# Patient Record
Sex: FEMALE | Race: ASIAN | NOT HISPANIC OR LATINO
[De-identification: names, ages, dates, MRNs, and addresses within clinical notes are randomized per-mention and may not be internally consistent; named-entity substitution may affect disease eponyms.]

---

## 2017-11-13 ENCOUNTER — RESULT REVIEW (OUTPATIENT)
Age: 27
End: 2017-11-13

## 2017-12-18 ENCOUNTER — INPATIENT (INPATIENT)
Facility: HOSPITAL | Age: 27
LOS: 0 days | Discharge: HOME | DRG: 358 | End: 2017-12-19
Attending: OBSTETRICS & GYNECOLOGY | Admitting: OBSTETRICS & GYNECOLOGY
Payer: COMMERCIAL

## 2017-12-18 VITALS — DIASTOLIC BLOOD PRESSURE: 84 MMHG | SYSTOLIC BLOOD PRESSURE: 124 MMHG | HEART RATE: 96 BPM | RESPIRATION RATE: 16 BRPM

## 2017-12-18 DIAGNOSIS — K66.1 HEMOPERITONEUM: ICD-10-CM

## 2017-12-18 DIAGNOSIS — R10.11 RIGHT UPPER QUADRANT PAIN: ICD-10-CM

## 2017-12-18 LAB
ALBUMIN SERPL ELPH-MCNC: 4.3 G/DL — SIGNIFICANT CHANGE UP (ref 3.3–5)
ALP SERPL-CCNC: 46 U/L — SIGNIFICANT CHANGE UP (ref 40–120)
ALT FLD-CCNC: 16 U/L RC — SIGNIFICANT CHANGE UP (ref 10–45)
ANION GAP SERPL CALC-SCNC: 10 MMOL/L — SIGNIFICANT CHANGE UP (ref 5–17)
APPEARANCE UR: CLEAR — SIGNIFICANT CHANGE UP
APTT BLD: 26.5 SEC — LOW (ref 27.5–37.4)
AST SERPL-CCNC: 18 U/L — SIGNIFICANT CHANGE UP (ref 10–40)
BACTERIA # UR AUTO: ABNORMAL /HPF
BASE EXCESS BLDV CALC-SCNC: -0.4 MMOL/L — SIGNIFICANT CHANGE UP (ref -2–2)
BASE EXCESS BLDV CALC-SCNC: -1.8 MMOL/L — SIGNIFICANT CHANGE UP (ref -2–2)
BASOPHILS # BLD AUTO: 0 K/UL — SIGNIFICANT CHANGE UP (ref 0–0.2)
BASOPHILS # BLD AUTO: 0 K/UL — SIGNIFICANT CHANGE UP (ref 0–0.2)
BASOPHILS # BLD AUTO: 0.1 K/UL — SIGNIFICANT CHANGE UP (ref 0–0.2)
BASOPHILS NFR BLD AUTO: 0 % — SIGNIFICANT CHANGE UP (ref 0–2)
BASOPHILS NFR BLD AUTO: 0.1 % — SIGNIFICANT CHANGE UP (ref 0–2)
BASOPHILS NFR BLD AUTO: 0.4 % — SIGNIFICANT CHANGE UP (ref 0–2)
BILIRUB SERPL-MCNC: 0.8 MG/DL — SIGNIFICANT CHANGE UP (ref 0.2–1.2)
BILIRUB UR-MCNC: NEGATIVE — SIGNIFICANT CHANGE UP
BLD GP AB SCN SERPL QL: NEGATIVE — SIGNIFICANT CHANGE UP
BUN SERPL-MCNC: 18 MG/DL — SIGNIFICANT CHANGE UP (ref 7–23)
CA-I SERPL-SCNC: 1.18 MMOL/L — SIGNIFICANT CHANGE UP (ref 1.12–1.3)
CA-I SERPL-SCNC: 1.2 MMOL/L — SIGNIFICANT CHANGE UP (ref 1.12–1.3)
CALCIUM SERPL-MCNC: 8.8 MG/DL — SIGNIFICANT CHANGE UP (ref 8.4–10.5)
CHLORIDE BLDV-SCNC: 103 MMOL/L — SIGNIFICANT CHANGE UP (ref 96–108)
CHLORIDE BLDV-SCNC: 104 MMOL/L — SIGNIFICANT CHANGE UP (ref 96–108)
CHLORIDE SERPL-SCNC: 99 MMOL/L — SIGNIFICANT CHANGE UP (ref 96–108)
CO2 BLDV-SCNC: 24 MMOL/L — SIGNIFICANT CHANGE UP (ref 22–30)
CO2 BLDV-SCNC: 26 MMOL/L — SIGNIFICANT CHANGE UP (ref 22–30)
CO2 SERPL-SCNC: 25 MMOL/L — SIGNIFICANT CHANGE UP (ref 22–31)
COLOR SPEC: YELLOW — SIGNIFICANT CHANGE UP
CREAT SERPL-MCNC: 0.77 MG/DL — SIGNIFICANT CHANGE UP (ref 0.5–1.3)
DIFF PNL FLD: NEGATIVE — SIGNIFICANT CHANGE UP
EOSINOPHIL # BLD AUTO: 0 K/UL — SIGNIFICANT CHANGE UP (ref 0–0.5)
EOSINOPHIL NFR BLD AUTO: 0.1 % — SIGNIFICANT CHANGE UP (ref 0–6)
EPI CELLS # UR: SIGNIFICANT CHANGE UP /HPF
GAS PNL BLDV: 133 MMOL/L — LOW (ref 136–145)
GAS PNL BLDV: 134 MMOL/L — LOW (ref 136–145)
GAS PNL BLDV: SIGNIFICANT CHANGE UP
GAS PNL BLDV: SIGNIFICANT CHANGE UP
GLUCOSE BLDV-MCNC: 107 MG/DL — HIGH (ref 70–99)
GLUCOSE BLDV-MCNC: 119 MG/DL — HIGH (ref 70–99)
GLUCOSE SERPL-MCNC: 119 MG/DL — HIGH (ref 70–99)
GLUCOSE UR QL: NEGATIVE — SIGNIFICANT CHANGE UP
HCG SERPL-ACNC: <2 MIU/ML — LOW (ref 5–24)
HCO3 BLDV-SCNC: 23 MMOL/L — SIGNIFICANT CHANGE UP (ref 21–29)
HCO3 BLDV-SCNC: 25 MMOL/L — SIGNIFICANT CHANGE UP (ref 21–29)
HCT VFR BLD CALC: 26.3 % — LOW (ref 34.5–45)
HCT VFR BLD CALC: 28.4 % — LOW (ref 34.5–45)
HCT VFR BLD CALC: 29.2 % — LOW (ref 34.5–45)
HCT VFR BLD CALC: 33.3 % — LOW (ref 34.5–45)
HCT VFR BLDA CALC: 30 % — LOW (ref 39–50)
HCT VFR BLDA CALC: 34 % — LOW (ref 39–50)
HGB BLD CALC-MCNC: 11.1 G/DL — LOW (ref 11.5–15.5)
HGB BLD CALC-MCNC: 9.7 G/DL — LOW (ref 11.5–15.5)
HGB BLD-MCNC: 10.1 G/DL — LOW (ref 11.5–15.5)
HGB BLD-MCNC: 11.3 G/DL — LOW (ref 11.5–15.5)
HGB BLD-MCNC: 9.5 G/DL — LOW (ref 11.5–15.5)
HGB BLD-MCNC: 9.7 G/DL — LOW (ref 11.5–15.5)
INR BLD: 1.15 RATIO — SIGNIFICANT CHANGE UP (ref 0.88–1.16)
KETONES UR-MCNC: NEGATIVE — SIGNIFICANT CHANGE UP
LACTATE BLDV-MCNC: 0.9 MMOL/L — SIGNIFICANT CHANGE UP (ref 0.7–2)
LACTATE BLDV-MCNC: 2.2 MMOL/L — HIGH (ref 0.7–2)
LEUKOCYTE ESTERASE UR-ACNC: NEGATIVE — SIGNIFICANT CHANGE UP
LIDOCAIN IGE QN: 27 U/L — SIGNIFICANT CHANGE UP (ref 7–60)
LYMPHOCYTES # BLD AUTO: 12.5 % — LOW (ref 13–44)
LYMPHOCYTES # BLD AUTO: 13.4 % — SIGNIFICANT CHANGE UP (ref 13–44)
LYMPHOCYTES # BLD AUTO: 2 K/UL — SIGNIFICANT CHANGE UP (ref 1–3.3)
LYMPHOCYTES # BLD AUTO: 2.2 K/UL — SIGNIFICANT CHANGE UP (ref 1–3.3)
LYMPHOCYTES # BLD AUTO: 21 % — SIGNIFICANT CHANGE UP (ref 13–44)
LYMPHOCYTES # BLD AUTO: 3 K/UL — SIGNIFICANT CHANGE UP (ref 1–3.3)
MCHC RBC-ENTMCNC: 31.2 PG — SIGNIFICANT CHANGE UP (ref 27–34)
MCHC RBC-ENTMCNC: 31.3 PG — SIGNIFICANT CHANGE UP (ref 27–34)
MCHC RBC-ENTMCNC: 31.6 PG — SIGNIFICANT CHANGE UP (ref 27–34)
MCHC RBC-ENTMCNC: 33.1 PG — SIGNIFICANT CHANGE UP (ref 27–34)
MCHC RBC-ENTMCNC: 34 GM/DL — SIGNIFICANT CHANGE UP (ref 32–36)
MCHC RBC-ENTMCNC: 34.1 GM/DL — SIGNIFICANT CHANGE UP (ref 32–36)
MCHC RBC-ENTMCNC: 34.6 GM/DL — SIGNIFICANT CHANGE UP (ref 32–36)
MCHC RBC-ENTMCNC: 36.1 GM/DL — HIGH (ref 32–36)
MCV RBC AUTO: 91.5 FL — SIGNIFICANT CHANGE UP (ref 80–100)
MCV RBC AUTO: 91.8 FL — SIGNIFICANT CHANGE UP (ref 80–100)
MONOCYTES # BLD AUTO: 0.7 K/UL — SIGNIFICANT CHANGE UP (ref 0–0.9)
MONOCYTES # BLD AUTO: 0.9 K/UL — SIGNIFICANT CHANGE UP (ref 0–0.9)
MONOCYTES # BLD AUTO: 0.9 K/UL — SIGNIFICANT CHANGE UP (ref 0–0.9)
MONOCYTES NFR BLD AUTO: 4.4 % — SIGNIFICANT CHANGE UP (ref 2–14)
MONOCYTES NFR BLD AUTO: 5.4 % — SIGNIFICANT CHANGE UP (ref 2–14)
MONOCYTES NFR BLD AUTO: 6.1 % — SIGNIFICANT CHANGE UP (ref 2–14)
NEUTROPHILS # BLD AUTO: 10.3 K/UL — HIGH (ref 1.8–7.4)
NEUTROPHILS # BLD AUTO: 13 K/UL — HIGH (ref 1.8–7.4)
NEUTROPHILS # BLD AUTO: 13.1 K/UL — HIGH (ref 1.8–7.4)
NEUTROPHILS NFR BLD AUTO: 72.8 % — SIGNIFICANT CHANGE UP (ref 43–77)
NEUTROPHILS NFR BLD AUTO: 80.6 % — HIGH (ref 43–77)
NEUTROPHILS NFR BLD AUTO: 82.9 % — HIGH (ref 43–77)
NITRITE UR-MCNC: NEGATIVE — SIGNIFICANT CHANGE UP
PCO2 BLDV: 40 MMHG — SIGNIFICANT CHANGE UP (ref 35–50)
PCO2 BLDV: 46 MMHG — SIGNIFICANT CHANGE UP (ref 35–50)
PH BLDV: 7.35 — SIGNIFICANT CHANGE UP (ref 7.35–7.45)
PH BLDV: 7.37 — SIGNIFICANT CHANGE UP (ref 7.35–7.45)
PH UR: 6 — SIGNIFICANT CHANGE UP (ref 5–8)
PLATELET # BLD AUTO: 211 K/UL — SIGNIFICANT CHANGE UP (ref 150–400)
PLATELET # BLD AUTO: 227 K/UL — SIGNIFICANT CHANGE UP (ref 150–400)
PLATELET # BLD AUTO: 239 K/UL — SIGNIFICANT CHANGE UP (ref 150–400)
PLATELET # BLD AUTO: 273 K/UL — SIGNIFICANT CHANGE UP (ref 150–400)
PO2 BLDV: 23 MMHG — LOW (ref 25–45)
PO2 BLDV: 33 MMHG — SIGNIFICANT CHANGE UP (ref 25–45)
POTASSIUM BLDV-SCNC: 3.5 MMOL/L — SIGNIFICANT CHANGE UP (ref 3.5–5)
POTASSIUM BLDV-SCNC: 4 MMOL/L — SIGNIFICANT CHANGE UP (ref 3.5–5)
POTASSIUM SERPL-MCNC: 4.1 MMOL/L — SIGNIFICANT CHANGE UP (ref 3.5–5.3)
POTASSIUM SERPL-SCNC: 4.1 MMOL/L — SIGNIFICANT CHANGE UP (ref 3.5–5.3)
PROT SERPL-MCNC: 7.1 G/DL — SIGNIFICANT CHANGE UP (ref 6–8.3)
PROT UR-MCNC: 30 MG/DL
PROTHROM AB SERPL-ACNC: 12.6 SEC — SIGNIFICANT CHANGE UP (ref 9.8–12.7)
RBC # BLD: 2.86 M/UL — LOW (ref 3.8–5.2)
RBC # BLD: 3.09 M/UL — LOW (ref 3.8–5.2)
RBC # BLD: 3.19 M/UL — LOW (ref 3.8–5.2)
RBC # BLD: 3.62 M/UL — LOW (ref 3.8–5.2)
RBC # FLD: 11.7 % — SIGNIFICANT CHANGE UP (ref 10.3–14.5)
RBC # FLD: 11.7 % — SIGNIFICANT CHANGE UP (ref 10.3–14.5)
RBC # FLD: 11.9 % — SIGNIFICANT CHANGE UP (ref 10.3–14.5)
RBC # FLD: 11.9 % — SIGNIFICANT CHANGE UP (ref 10.3–14.5)
RBC CASTS # UR COMP ASSIST: SIGNIFICANT CHANGE UP /HPF (ref 0–2)
RH IG SCN BLD-IMP: POSITIVE — SIGNIFICANT CHANGE UP
RH IG SCN BLD-IMP: POSITIVE — SIGNIFICANT CHANGE UP
SAO2 % BLDV: 31 % — LOW (ref 67–88)
SAO2 % BLDV: 57 % — LOW (ref 67–88)
SODIUM SERPL-SCNC: 134 MMOL/L — LOW (ref 135–145)
SP GR SPEC: 1.02 — SIGNIFICANT CHANGE UP (ref 1.01–1.02)
UROBILINOGEN FLD QL: NEGATIVE — SIGNIFICANT CHANGE UP
WBC # BLD: 14.2 K/UL — HIGH (ref 3.8–10.5)
WBC # BLD: 15.1 K/UL — HIGH (ref 3.8–10.5)
WBC # BLD: 15.8 K/UL — HIGH (ref 3.8–10.5)
WBC # BLD: 16.1 K/UL — HIGH (ref 3.8–10.5)
WBC # FLD AUTO: 14.2 K/UL — HIGH (ref 3.8–10.5)
WBC # FLD AUTO: 15.1 K/UL — HIGH (ref 3.8–10.5)
WBC # FLD AUTO: 15.8 K/UL — HIGH (ref 3.8–10.5)
WBC # FLD AUTO: 16.1 K/UL — HIGH (ref 3.8–10.5)
WBC UR QL: SIGNIFICANT CHANGE UP /HPF (ref 0–5)

## 2017-12-18 PROCEDURE — 76705 ECHO EXAM OF ABDOMEN: CPT | Mod: 26

## 2017-12-18 PROCEDURE — 74177 CT ABD & PELVIS W/CONTRAST: CPT | Mod: 26

## 2017-12-18 PROCEDURE — 99285 EMERGENCY DEPT VISIT HI MDM: CPT

## 2017-12-18 RX ORDER — MORPHINE SULFATE 50 MG/1
2 CAPSULE, EXTENDED RELEASE ORAL ONCE
Qty: 0 | Refills: 0 | Status: DISCONTINUED | OUTPATIENT
Start: 2017-12-18 | End: 2017-12-18

## 2017-12-18 RX ORDER — HYDROMORPHONE HYDROCHLORIDE 2 MG/ML
0.5 INJECTION INTRAMUSCULAR; INTRAVENOUS; SUBCUTANEOUS
Qty: 0 | Refills: 0 | Status: DISCONTINUED | OUTPATIENT
Start: 2017-12-18 | End: 2017-12-18

## 2017-12-18 RX ORDER — SODIUM CHLORIDE 9 MG/ML
1000 INJECTION INTRAMUSCULAR; INTRAVENOUS; SUBCUTANEOUS ONCE
Qty: 0 | Refills: 0 | Status: COMPLETED | OUTPATIENT
Start: 2017-12-18 | End: 2017-12-18

## 2017-12-18 RX ORDER — SODIUM CHLORIDE 9 MG/ML
1000 INJECTION, SOLUTION INTRAVENOUS
Qty: 0 | Refills: 0 | Status: DISCONTINUED | OUTPATIENT
Start: 2017-12-18 | End: 2017-12-19

## 2017-12-18 RX ORDER — OXYCODONE HYDROCHLORIDE 5 MG/1
5 TABLET ORAL EVERY 4 HOURS
Qty: 0 | Refills: 0 | Status: DISCONTINUED | OUTPATIENT
Start: 2017-12-18 | End: 2017-12-19

## 2017-12-18 RX ORDER — ACETAMINOPHEN 500 MG
1000 TABLET ORAL ONCE
Qty: 0 | Refills: 0 | Status: COMPLETED | OUTPATIENT
Start: 2017-12-18 | End: 2017-12-18

## 2017-12-18 RX ORDER — ACETAMINOPHEN 500 MG
975 TABLET ORAL EVERY 6 HOURS
Qty: 0 | Refills: 0 | Status: DISCONTINUED | OUTPATIENT
Start: 2017-12-18 | End: 2017-12-19

## 2017-12-18 RX ADMIN — HYDROMORPHONE HYDROCHLORIDE 0.5 MILLIGRAM(S): 2 INJECTION INTRAMUSCULAR; INTRAVENOUS; SUBCUTANEOUS at 20:22

## 2017-12-18 RX ADMIN — Medication 400 MILLIGRAM(S): at 11:49

## 2017-12-18 RX ADMIN — MORPHINE SULFATE 2 MILLIGRAM(S): 50 CAPSULE, EXTENDED RELEASE ORAL at 15:54

## 2017-12-18 RX ADMIN — HYDROMORPHONE HYDROCHLORIDE 0.5 MILLIGRAM(S): 2 INJECTION INTRAMUSCULAR; INTRAVENOUS; SUBCUTANEOUS at 20:40

## 2017-12-18 RX ADMIN — SODIUM CHLORIDE 1000 MILLILITER(S): 9 INJECTION INTRAMUSCULAR; INTRAVENOUS; SUBCUTANEOUS at 15:55

## 2017-12-18 RX ADMIN — SODIUM CHLORIDE 125 MILLILITER(S): 9 INJECTION, SOLUTION INTRAVENOUS at 21:15

## 2017-12-18 RX ADMIN — Medication 1000 MILLIGRAM(S): at 15:51

## 2017-12-18 RX ADMIN — Medication 975 MILLIGRAM(S): at 20:18

## 2017-12-18 RX ADMIN — Medication 975 MILLIGRAM(S): at 23:41

## 2017-12-18 RX ADMIN — Medication 975 MILLIGRAM(S): at 23:11

## 2017-12-18 RX ADMIN — HYDROMORPHONE HYDROCHLORIDE 0.5 MILLIGRAM(S): 2 INJECTION INTRAMUSCULAR; INTRAVENOUS; SUBCUTANEOUS at 21:59

## 2017-12-18 RX ADMIN — HYDROMORPHONE HYDROCHLORIDE 0.5 MILLIGRAM(S): 2 INJECTION INTRAMUSCULAR; INTRAVENOUS; SUBCUTANEOUS at 21:37

## 2017-12-18 RX ADMIN — Medication 975 MILLIGRAM(S): at 21:14

## 2017-12-18 RX ADMIN — SODIUM CHLORIDE 1000 MILLILITER(S): 9 INJECTION INTRAMUSCULAR; INTRAVENOUS; SUBCUTANEOUS at 11:49

## 2017-12-18 NOTE — BRIEF OPERATIVE NOTE - OPERATION/FINDINGS
Intraabdominal survey revealed hemoperitoneum, grossly normal appearing organs.   Normal appearing uterus, bilateral fallopian tubes, and left ovary. Right ovary with small focus of hemorrhagic tissue, likely ruptured luteal cyst.   Left pelvic wall adhesions

## 2017-12-18 NOTE — ED PROVIDER NOTE - MEDICAL DECISION MAKING DETAILS
27 y.o. female pw RUQ pain x 1 day. Pain appears to be severe, +RUQ ttp. Concern for acute hudson will obtain labs, fluids, RUQ US, analgesia, reevaluate. 27 y.o. female pw RUQ pain x 1 day. Pain appears to be severe, +RUQ ttp. Concern for acute hudson will obtain labs, fluids, RUQ US, analgesia, reevaluate.  lyla - right uq /epigastric pain, since last night , with near syncope this am, ck hcg, r/o preg, r/o bilary pathology ck lfts - reeval -

## 2017-12-18 NOTE — ED PROVIDER NOTE - PHYSICAL EXAMINATION
Gen: Well but uncomfortable in obvious pain AOx3  Head: NCAT  HEENT: MM dry, normal conjunctiva  Lung: CTAB, no rales, rhonchi or wheezing  CV: S1/S2, no murmurs, rubs or gallops  Abd: soft, + tenderness RUQ and epigastrium, no rebound. +guarding  MSK: No CVA tenderness. No edema, no visible deformities  Neuro: No focal neurologic deficits.   Skin: Warm and dry, no evidence of rash  Psych: normal mood and affect Gen: Well but uncomfortable in obvious pain AOx3  Head: NCAT  HEENT: MM dry, normal conjunctiva  Lung: CTAB, no rales, rhonchi or wheezing  CV: S1/S2, no murmurs, rubs or gallops  Abd: soft, + tenderness RUQ and epigastrium, no rebound. +guarding, lyla - query hepatomegly   MSK: No CVA tenderness. No edema, no visible deformities  Neuro: No focal neurologic deficits.   Skin: Warm and dry, no evidence of rash  Psych: normal mood and affect

## 2017-12-18 NOTE — CONSULT NOTE ADULT - ATTENDING COMMENTS
Gyn attg note  I personally saw and examined the pt in ED  exam is concerning for abd distention w/ peritoneal signs.     sono: suspicious for hemoperitoneum.  impression: likely hemoperitoneum from a ruptured hemorrhagic ov cyst.  pt is currently stable.    recommend: admission (gyn svc OK), IV hydration, keep NPO, serial blood counts, CT study w/ contrast.  If suspicion of active bleeding, she'll need surgical intervention (laparoscopic assessment and mgmt of bleeding)

## 2017-12-18 NOTE — ED PROVIDER NOTE - PROGRESS NOTE DETAILS
pocus -0 gb unremarkable - large ff in morrisons and splenorenal - ucg neg, tv us show involated complex cyst on r with complex heterogenous clot anf ff in rt adnexa - zenialey hemorrgic cyst - ob paged pt no t tach or hypotensive will give fluids - cross sent annie ob attending requesting ct, believe other etiology unlikley - will ct admit to gyn as per louie dw rads no other etiology of hemoperitoneum - likely from right ovary

## 2017-12-18 NOTE — CONSULT NOTE ADULT - SUBJECTIVE AND OBJECTIVE BOX
R2 GYN Consult Note    26yo  LMP 17 presenting with severe lower abdominal pain since last night. Patient reports acute onset of spasm-like pain in lower abdomen last night, was relieved by Motrin and she got some sleep. This morning, she woke up with worse pain and reports radiating to epigastric area. The pain is described as intermittent sharp spasms, worse with movement and better with laying down. She reports nausea. No lightheadedness, dizziness, chest pain, SOB. She denies dysuria, polyuria, or diarrhea. She has chronic constipation and endorses this today.    GYN = Dr. Stevens      OBHx: eTOP x1  GynHx: reg menses, +fibroids, no known cysts, abnl Paps or STIs  PMSH: s/p D&C x1, bilateral eye muscle repair  All: NKDA  Meds: none  SocialHx: denies smoking, alcohol, or other drugs    Vital Signs Last 24 Hrs  T(C): 36.6 (18 Dec 2017 10:55), Max: 36.6 (18 Dec 2017 10:55)  T(F): 97.9 (18 Dec 2017 10:55), Max: 97.9 (18 Dec 2017 10:55)  HR: 85 (18 Dec 2017 11:50) (85 - 99)  BP: 114/88 (18 Dec 2017 11:50) (114/88 - 133/76)  RR: 18 (18 Dec 2017 11:50) (16 - 18)  SpO2: 97% (18 Dec 2017 11:50) (97% - 98%)    PE:  Gen: Comfortable, NAD  Chest: breathing comfortably, no issues  Abd: Soft, TTP in suprapubic area and RLQ. No rebound or guarding  Ext: No edema or tenderness bilaterally  Spec Exam: thick whitish discharge in os, no bleeding, no gross lesions  Bimanual: anteverted uterus, no CMT, +bilateral adnexal tenderness    LABS:                        11.3   16.1  )-----------( 273      ( 18 Dec 2017 11:24 )             33.3     12-18    134<L>  |  99  |  18  ----------------------------<  119<H>  4.1   |  25  |  0.77    Ca    8.8      18 Dec 2017 11:24    TPro  7.1  /  Alb  4.3  /  TBili  0.8  /  DBili  x   /  AST  18  /  ALT  16  /  AlkPhos  46  12-18      Urinalysis Basic - ( 18 Dec 2017 11:35 )    Color: Yellow / Appearance: Clear / S.022 / pH: x  Gluc: x / Ketone: Negative  / Bili: Negative / Urobili: Negative   Blood: x / Protein: 30 mg/dL / Nitrite: Negative   Leuk Esterase: Negative / RBC: 0-2 /HPF / WBC 3-5 /HPF   Sq Epi: x / Non Sq Epi: Few /HPF / Bacteria: Few /HPF        RADIOLOGY & ADDITIONAL STUDIES:  < from: US Echo Abdomen Limited (ED) (12.18.17 @ 12:05) >  Free fluid in Reed's, free fluid in splenorenal -  the patients uterus, ovaries and adnexa scanned with transabdominal and   transvaginal probe- the uterus is empty, the left ovary is 2.2 x2.4x2.2   cm with surrounding free fluid the rt ovary is 3.7x 2.3 cm with complex   follicular cyst w peripheral hyperemia, there is complex free fluid   surrounding right ovary likely representing clotted blood  Right and left ovary with normal arterial and venous flow    IMPRESSION:large complex free intraperitoneal and pelvic free fluid,   uterus is empty complex right ovarian cyst - with neg [facility] concern for   ruptured hemorrhagic cyst

## 2017-12-18 NOTE — H&P ADULT - HISTORY OF PRESENT ILLNESS
R2 GYN Admit    26yo  LMP 17 presenting with severe lower abdominal pain since last night. Patient reports acute onset of spasm-like pain in lower abdomen last night, was relieved by Motrin and she got some sleep. This morning, she woke up with worse pain and reports radiating to epigastric area. The pain is described as intermittent sharp spasms, worse with movement and better with laying down. She reports nausea. No lightheadedness, dizziness, chest pain, SOB. She denies dysuria, polyuria, or diarrhea. She has chronic constipation and endorses this today.    GYN = Dr. Stevens      OBHx: eTOP x1  GynHx: reg menses, +fibroids, no known cysts, abnl Paps or STIs  PMSH: s/p D&C x1, bilateral eye muscle repair  All: NKDA  Meds: none  SocialHx: denies smoking, alcohol, or other drugs    Vital Signs Last 24 Hrs  T(C): 37.2 (18 Dec 2017 17:15), Max: 37.2 (18 Dec 2017 17:15)  T(F): 99 (18 Dec 2017 17:15), Max: 99 (18 Dec 2017 17:15)  HR: 104 (18 Dec 2017 17:15) (85 - 104)  BP: 120/76 (18 Dec 2017 17:15) (114/88 - 133/76)  RR: 20 (18 Dec 2017 17:15) (16 - 20)  SpO2: 100% (18 Dec 2017 17:15) (97% - 100%)    PE:  Gen: Comfortable, NAD  Chest: breathing comfortably, no issues  Abd: Soft, TTP in suprapubic area and RLQ. +Guarding, no rebound tenderness  Ext: No edema or tenderness bilaterally  Spec Exam: thick whitish discharge in os, no bleeding, no gross lesions  Bimanual: anteverted uterus, no CMT, +bilateral adnexal tenderness    LABS:                        9.5    14.2  )-----------( 211      ( 18 Dec 2017 15:53 )             26.3                         10.1   15.8  )-----------( 227      ( 18 Dec 2017 13:30 )             29.2                         11.3   16.1  )-----------( 273      ( 18 Dec 2017 11:24 )             33.3     12-18    134<L>  |  99  |  18  ----------------------------<  119<H>  4.1   |  25  |  0.77    Ca    8.8      18 Dec 2017 11:24    TPro  7.1  /  Alb  4.3  /  TBili  0.8  /  DBili  x   /  AST  18  /  ALT  16  /  AlkPhos  46  12-18    PT/INR - ( 18 Dec 2017 16:24 )   PT: 12.6 sec;   INR: 1.15 ratio         PTT - ( 18 Dec 2017 16:24 )  PTT:26.5 sec  Urinalysis Basic - ( 18 Dec 2017 11:35 )    Color: Yellow / Appearance: Clear / S.022 / pH: x  Gluc: x / Ketone: Negative  / Bili: Negative / Urobili: Negative   Blood: x / Protein: 30 mg/dL / Nitrite: Negative   Leuk Esterase: Negative / RBC: 0-2 /HPF / WBC 3-5 /HPF   Sq Epi: x / Non Sq Epi: Few /HPF / Bacteria: Few /HPF        RADIOLOGY & ADDITIONAL STUDIES:  < from: CT Abdomen and Pelvis w/ IV Cont (17 @ 15:06) >  Right corpus luteal cyst with large volume hemoperitoneum, compatible   with a ruptured hemorrhagic cyst. There is a suggestion of a site of   active bleeding in the right pelvis.    < from: US Echo Abdomen Limited (ED) (17 @ 12:05) >  large complex free intraperitoneal and pelvic free fluid,   uterus is empty complex right ovarian cyst - with neg [facility] concern for   ruptured hemorrhagic cyst

## 2017-12-18 NOTE — BRIEF OPERATIVE NOTE - PROCEDURE
<<-----Click on this checkbox to enter Procedure Diagnostic laparoscopy by gynecology  12/18/2017    Active  [id number]  Evacuation of hemoperitoneum  12/18/2017    Active  [id number]

## 2017-12-18 NOTE — ED ADULT NURSE NOTE - OBJECTIVE STATEMENT
28 y/o female a+ox3, pmhx chronic constipation, via EMS for abdominal pain x1 day. Pt reports constant abdominal pain throughout abdomen, intermittent radiation to upper left quadrant on palpation, lightheaded when standing, vomiting x1, no hematuria. Pt states pain started after eating "chinese takeout" yesterday, denies allergies. Pt states LMP 11/5/17, normal flow as per pt. Pt mark chest pain or discomfort, headache, dizziness, difficulty breathing, diarrhea, fever or chills, falls or trauma. VS documented, IV established, labs drawn, 26 y/o female a+ox3, pmhx chronic constipation, via EMS for abdominal pain x1 day. Pt reports constant pain throughout abdomen, intermittent radiation to upper left quadrant on palpation, lightheaded when standing, vomiting x1, no blood in emesis. Pt states pain started after eating "chinese takeout" yesterday, progressively worsening, denies allergies. Pt states LMP 11/5/17, normal flow as per pt, never been pregnant as per pt. Pt denies hematuria, melena, pain with urination, chest pain or discomfort, headache, dizziness, difficulty breathing, diarrhea, fever or chills, falls or trauma. Assessment: abdomen soft with tenderness on palpation, mild distention as per pt. VS documented, IV established, labs drawn. MD at bedside, pt on CM with spo2. Will reassess.

## 2017-12-18 NOTE — ED PROVIDER NOTE - CARE PLAN
Principal Discharge DX:	Right upper quadrant abdominal pain Principal Discharge DX:	Right upper quadrant abdominal pain  Secondary Diagnosis:	Hemoperitoneum, nontraumatic  Secondary Diagnosis:	Cyst of right ovary

## 2017-12-18 NOTE — BRIEF OPERATIVE NOTE - PRE-OP DX
Hemoperitoneum, nontraumatic  12/18/2017    Bobo Ramires  Hemorrhagic cyst of right ovary  12/18/2017    Bobo Ramires

## 2017-12-18 NOTE — ED PROVIDER NOTE - OBJECTIVE STATEMENT
27 y.o. female hx of constipation, otherwise healthy pw severe right upper quadrant pain since last night. Pt states progressively getting worse, 10/10, "feels like a spasm". Associated with nausea and 1 episode of nbnb vomiting today, feels "bloated" and had 1 small hard stool today. Took ibuprofen last night with some relief. LMP 1 month ago. Pt denies prior feeling of pain.

## 2017-12-18 NOTE — H&P ADULT - PROBLEM SELECTOR PLAN 1
- to OR for Dx Laparoscopy, possible evacuation of hemoperitoneum, possible oophorectomy, possible cystectomy  - admit to GYN   - monitor VS    Pt seen with Dr. Rodney Dietrich, PGY2

## 2017-12-18 NOTE — CONSULT NOTE ADULT - ASSESSMENT
28yo  LMP 17 with acute onset pelvic pain, found to have likely ruptured hemorrhagic cyst.    Pt is afebrile, hemodynamically stable, with imaging findings of large free peritoneal fluid. 26yo  LMP 17 with acute onset pelvic pain, found to have likely ruptured hemorrhagic cyst.    Pt is afebrile, hemodynamically stable, with imaging findings of large free peritoneal fluid.     - recommend serial CBC to trend hct, eval for active bleeding  - analgesia prn, avoid NSAIDs  - GYN following    Pt seen with Dr. Rodney Dietrich, PGY2

## 2017-12-18 NOTE — ED PROVIDER NOTE - NS ED ROS FT
ROS: denies HA, weakness, dizziness, fevers/chills, chest pain, SOB, diaphoresis, back/neck pain, dysuria/hematuria, or rash  +ab pain, nausea, vomiting, constipation

## 2017-12-18 NOTE — ED PROCEDURE NOTE - PROCEDURE ADDITIONAL DETAILS
POCUS: Emergency Department Focused Ultrasound performed at patient's bedside.  The complete report can be found in PACS.
Emergency Department Focused Ultrasound performed at patient's bedside.  The complete report can be found in PACS.

## 2017-12-18 NOTE — H&P ADULT - ASSESSMENT
26yo  LMP 17 with acute onset pelvic pain, found to have hemoperitoneum likely due to ruptured hemorrhagic cyst.    Pt is afebrile, hemodynamically stable, with imaging findings of large free peritoneal fluid. Patient was monitored in ED with serial CBC. Hct noted to be downtrending. CT scan confirmed large free fluid.   Patient will benefit from surgical intervention - diagnostic laparoscopy, possible cystectomy, possible oophorectomy, evacuation of hemoperitoneum. Dr. Stevens discussed Risks, benefits, alternatives with patient and signed informed consent with witness present

## 2017-12-19 ENCOUNTER — TRANSCRIPTION ENCOUNTER (OUTPATIENT)
Age: 27
End: 2017-12-19

## 2017-12-19 VITALS
DIASTOLIC BLOOD PRESSURE: 66 MMHG | RESPIRATION RATE: 18 BRPM | TEMPERATURE: 98 F | OXYGEN SATURATION: 98 % | HEART RATE: 92 BPM | SYSTOLIC BLOOD PRESSURE: 102 MMHG

## 2017-12-19 DIAGNOSIS — Z98.890 OTHER SPECIFIED POSTPROCEDURAL STATES: ICD-10-CM

## 2017-12-19 LAB
HCT VFR BLD CALC: 28.3 % — LOW (ref 34.5–45)
HGB BLD-MCNC: 9.6 G/DL — LOW (ref 11.5–15.5)
MCHC RBC-ENTMCNC: 31.2 PG — SIGNIFICANT CHANGE UP (ref 27–34)
MCHC RBC-ENTMCNC: 33.8 GM/DL — SIGNIFICANT CHANGE UP (ref 32–36)
MCV RBC AUTO: 92.3 FL — SIGNIFICANT CHANGE UP (ref 80–100)
PLATELET # BLD AUTO: 241 K/UL — SIGNIFICANT CHANGE UP (ref 150–400)
RBC # BLD: 3.06 M/UL — LOW (ref 3.8–5.2)
RBC # FLD: 11.9 % — SIGNIFICANT CHANGE UP (ref 10.3–14.5)
WBC # BLD: 12.6 K/UL — HIGH (ref 3.8–10.5)
WBC # FLD AUTO: 12.6 K/UL — HIGH (ref 3.8–10.5)

## 2017-12-19 PROCEDURE — 80053 COMPREHEN METABOLIC PANEL: CPT

## 2017-12-19 PROCEDURE — 84295 ASSAY OF SERUM SODIUM: CPT

## 2017-12-19 PROCEDURE — 86850 RBC ANTIBODY SCREEN: CPT

## 2017-12-19 PROCEDURE — 86923 COMPATIBILITY TEST ELECTRIC: CPT

## 2017-12-19 PROCEDURE — 96374 THER/PROPH/DIAG INJ IV PUSH: CPT | Mod: XU

## 2017-12-19 PROCEDURE — 86901 BLOOD TYPING SEROLOGIC RH(D): CPT

## 2017-12-19 PROCEDURE — 84132 ASSAY OF SERUM POTASSIUM: CPT

## 2017-12-19 PROCEDURE — 74177 CT ABD & PELVIS W/CONTRAST: CPT

## 2017-12-19 PROCEDURE — 82330 ASSAY OF CALCIUM: CPT

## 2017-12-19 PROCEDURE — 99285 EMERGENCY DEPT VISIT HI MDM: CPT | Mod: 25

## 2017-12-19 PROCEDURE — 86900 BLOOD TYPING SEROLOGIC ABO: CPT

## 2017-12-19 PROCEDURE — P9016: CPT

## 2017-12-19 PROCEDURE — 82803 BLOOD GASES ANY COMBINATION: CPT

## 2017-12-19 PROCEDURE — 83690 ASSAY OF LIPASE: CPT

## 2017-12-19 PROCEDURE — 76705 ECHO EXAM OF ABDOMEN: CPT

## 2017-12-19 PROCEDURE — 84702 CHORIONIC GONADOTROPIN TEST: CPT

## 2017-12-19 PROCEDURE — 85027 COMPLETE CBC AUTOMATED: CPT

## 2017-12-19 PROCEDURE — 83605 ASSAY OF LACTIC ACID: CPT

## 2017-12-19 PROCEDURE — 82435 ASSAY OF BLOOD CHLORIDE: CPT

## 2017-12-19 PROCEDURE — 85014 HEMATOCRIT: CPT

## 2017-12-19 PROCEDURE — 85730 THROMBOPLASTIN TIME PARTIAL: CPT

## 2017-12-19 PROCEDURE — 81001 URINALYSIS AUTO W/SCOPE: CPT

## 2017-12-19 PROCEDURE — 85610 PROTHROMBIN TIME: CPT

## 2017-12-19 PROCEDURE — 82947 ASSAY GLUCOSE BLOOD QUANT: CPT

## 2017-12-19 RX ORDER — OXYCODONE HYDROCHLORIDE 5 MG/1
1 TABLET ORAL
Qty: 10 | Refills: 0
Start: 2017-12-19

## 2017-12-19 RX ORDER — IBUPROFEN 200 MG
1 TABLET ORAL
Qty: 40 | Refills: 0
Start: 2017-12-19 | End: 2017-12-28

## 2017-12-19 RX ORDER — IBUPROFEN 200 MG
600 TABLET ORAL EVERY 6 HOURS
Qty: 0 | Refills: 0 | Status: DISCONTINUED | OUTPATIENT
Start: 2017-12-19 | End: 2017-12-19

## 2017-12-19 RX ADMIN — OXYCODONE HYDROCHLORIDE 5 MILLIGRAM(S): 5 TABLET ORAL at 00:40

## 2017-12-19 RX ADMIN — Medication 975 MILLIGRAM(S): at 05:50

## 2017-12-19 RX ADMIN — Medication 975 MILLIGRAM(S): at 11:27

## 2017-12-19 RX ADMIN — OXYCODONE HYDROCHLORIDE 5 MILLIGRAM(S): 5 TABLET ORAL at 05:20

## 2017-12-19 RX ADMIN — OXYCODONE HYDROCHLORIDE 5 MILLIGRAM(S): 5 TABLET ORAL at 01:10

## 2017-12-19 RX ADMIN — OXYCODONE HYDROCHLORIDE 5 MILLIGRAM(S): 5 TABLET ORAL at 05:50

## 2017-12-19 RX ADMIN — Medication 975 MILLIGRAM(S): at 05:20

## 2017-12-19 RX ADMIN — OXYCODONE HYDROCHLORIDE 5 MILLIGRAM(S): 5 TABLET ORAL at 12:57

## 2017-12-19 NOTE — PROGRESS NOTE ADULT - ASSESSMENT
26yo  now POD#1 with ruptured hemorrhagic cyst s/p Dx laparoscopy, evacuation of hemoperitoneum  Patient is doing well postoperatively

## 2017-12-19 NOTE — PROGRESS NOTE ADULT - PROBLEM SELECTOR PLAN 1
CV: hemodynamically stable, no clinical sx of anemia  Pulm: normal O2 sats on room air  GI: tolerating reg diet  : voiding freely, no issues  Heme: hct stable, Venodynes for DVT ppx  Dispo: pt meeting postop milestones. Anticipate d/c home today    Bobo Dietrich, PGY2

## 2017-12-19 NOTE — DISCHARGE NOTE ADULT - CARE PROVIDER_API CALL
Vicente Stevens), Obstetrics and Gynecology  [address]  [address]  [place] [address]  Phone: [phone]  Fax: [phone]

## 2017-12-19 NOTE — PROGRESS NOTE ADULT - SUBJECTIVE AND OBJECTIVE BOX
Post-operative Note    Subjective:   Pt seen and examined at bedside. Pt complains of pain 5/10, exacerbated with palpation. Pt denies any SOB, CP, n/v, fever/chills. No flatus at this time. No other complaints.    Objective:  T(F): 98 (17 @ 01:50), Max: 99 (17 @ 17:15)  HR: 90 (17 @ 01:50) (79 - 104)  BP: 109/67 (17 @ 01:50) (104/63 - 133/76)  RR: 18 (17 @ 01:50) (15 - 20)  SpO2: 99% (17 @ 01:50) (95% - 100%)  I&O's Summary    18 Dec 2017 07:01  -  19 Dec 2017 02:21  --------------------------------------------------------  IN: 525 mL / OUT: 1200 mL / NET: -675 mL      MEDICATIONS  (STANDING):  acetaminophen   Tablet. 975 milliGRAM(s) Oral every 6 hours  lactated ringers. 1000 milliLiter(s) (125 mL/Hr) IV Continuous <Continuous>    MEDICATIONS  (PRN):  oxyCODONE    IR 5 milliGRAM(s) Oral every 4 hours PRN Severe Pain (7 - 10)      Physical Exam:  Constitutional: NAD, A+O x3  CV: RRR  Lungs: clear to auscultation bilaterally  Abdomen: soft, nondistended, no guarding, no rebound, normal bowel sounds  Incision: port-sites w/dressings - clean/dry/intact  Extremities: no lower extremity edema or calf tenderness bilaterally; venodynes in place    LABS:        134<L>  |  99     |  18     ----------------------------<  119<H>  4.1     |  25     |  0.77     Ca    8.8        18 Dec 2017 11:24    TPro  7.1    /  Alb  4.3    /  TBili  0.8    /  DBili  x      /  AST  18     /  ALT  16     /  AlkPhos  46     12-18        PT/INR - ( 18 Dec 2017 16:24 )   PT: 12.6 sec;   INR: 1.15 ratio         PTT - ( 18 Dec 2017 16:24 )  PTT:26.5 sec  Urinalysis Basic - ( 18 Dec 2017 11:35 )    Color: Yellow / Appearance: Clear / S.022 / pH: x  Gluc: x / Ketone: Negative  / Bili: Negative / Urobili: Negative   Blood: x / Protein: 30 mg/dL / Nitrite: Negative   Leuk Esterase: Negative / RBC: 0-2 /HPF / WBC 3-5 /HPF   Sq Epi: x / Non Sq Epi: Few /HPF / Bacteria: Few /HPF

## 2017-12-19 NOTE — DISCHARGE NOTE ADULT - HOSPITAL COURSE
Patient presented to ED with severe abdominal pain, and was found to have hemoperitoneum, likely due to ruptured cyst. Patient was hemodynamically stable in ED and monitored with CBC trend and vitals. CT imaging showed suspicion for active bleeding so decision was made for surgical intervention. Patient underwent diagnostic laparoscopy, evacuation of hemoperitoneum. Procedure was uncomplicated, see op note for details. Patient was transferred to PACU in stable conditions. She voided freely and was tolerating clear diet. On POD#1, patient was ambulating without difficulty, tolerating regular diet, and pain was well controlled on oral medications.   Patient was deemed stable for discharge home with outpatient follow up. Patient presented to ED with severe abdominal pain, and was found to have hemoperitoneum, likely due to ruptured cyst. Patient was hemodynamically stable in ED and monitored with CBC trend and vitals. CT imaging showed suspicion for active bleeding so decision was made for surgical intervention. Patient underwent diagnostic laparoscopy, evacuation of hemoperitoneum. Procedure was uncomplicated, see op note for details. Patient was transferred to PACU in stable conditions. She voided freely and was tolerating clear diet. On POD#1, patient was ambulating without difficulty, tolerating regular diet, and pain was well controlled on oral medications.   Patient was deemed stable for discharge home with outpatient follow up. She is to be excused from work until 12/29/17.

## 2017-12-19 NOTE — DISCHARGE NOTE ADULT - MEDICATION SUMMARY - MEDICATIONS TO TAKE
I will START or STAY ON the medications listed below when I get home from the hospital:    oxyCODONE 5 mg oral tablet  -- 1 tab(s) by mouth every 4 hours, As needed, Severe Pain (7 - 10) MDD:8  -- Indication: For postop pain    ibuprofen 600 mg oral tablet  -- 1 tab(s) by mouth every 6 hours   -- Do not take this drug if you are pregnant.  It is very important that you take or use this exactly as directed.  Do not skip doses or discontinue unless directed by your doctor.  May cause drowsiness or dizziness.  Obtain medical advice before taking any non-prescription drugs as some may affect the action of this medication.  Take with food or milk.    -- Indication: For postop pain I will START or STAY ON the medications listed below when I get home from the hospital:    oxyCODONE 5 mg oral tablet  -- 1 tab(s) by mouth every 4 hours, As needed, Severe Pain (7 - 10) MDD:8  -- Indication: For moderate pain    ibuprofen 600 mg oral tablet  -- 1 tab(s) by mouth every 6 hours   -- Do not take this drug if you are pregnant.  It is very important that you take or use this exactly as directed.  Do not skip doses or discontinue unless directed by your doctor.  May cause drowsiness or dizziness.  Obtain medical advice before taking any non-prescription drugs as some may affect the action of this medication.  Take with food or milk.    -- Indication: For cramping pain

## 2017-12-19 NOTE — PROGRESS NOTE ADULT - ATTENDING COMMENTS
The patient was seen and examined by me. I agree with the above assessment and plan.  hgb is stable.  d/c planning.

## 2017-12-19 NOTE — PROGRESS NOTE ADULT - SUBJECTIVE AND OBJECTIVE BOX
R2 GYN Progress Note    INTERVAL HPI/OVERNIGHT EVENTS:   Pt seen and examined at bedside.  Reports pain around right incision overnight, now better since she got oxycodone. She tolerated liquids last night, denies nausea or vomiting. She is passing flatus.  No chest pain, SOB    MEDICATIONS  (STANDING):  acetaminophen   Tablet. 975 milliGRAM(s) Oral every 6 hours  lactated ringers. 1000 milliLiter(s) (125 mL/Hr) IV Continuous <Continuous>    MEDICATIONS  (PRN):  oxyCODONE    IR 5 milliGRAM(s) Oral every 4 hours PRN Severe Pain (7 - 10)      Allergies    No Known Allergies    Intolerances        12 point ROS negative except as outlined above    Vital Signs Last 24 Hrs  T(C): 36.7 (19 Dec 2017 02:50), Max: 37.2 (18 Dec 2017 17:15)  T(F): 98.1 (19 Dec 2017 02:50), Max: 99 (18 Dec 2017 17:15)  HR: 97 (19 Dec 2017 02:50) (79 - 104)  BP: 105/69 (19 Dec 2017 02:50) (104/63 - 133/76)  BP(mean): 77 (18 Dec 2017 21:00) (77 - 83)  RR: 18 (19 Dec 2017 02:50) (15 - 20)  SpO2: 99% (19 Dec 2017 02:50) (95% - 100%)    Last Menstrual Period      PHYSICAL EXAM:    GA: NAD, A+0 x 3  CV: RRR  Pulm: CTA BL  Abd: ( + ) BS, soft, nontender, nondistended, no rebound or guarding,   Incision: 3 LSC incisions clean dry intact, steri strips in place  Extremities: no swelling or calf tenderness, reflexes +2 bilaterally          LABS:                        9.7    15.1  )-----------( 239      ( 18 Dec 2017 22:49 )             28.4     12-18    134<L>  |  99  |  18  ----------------------------<  119<H>  4.1   |  25  |  0.77    Ca    8.8      18 Dec 2017 11:24    TPro  7.1  /  Alb  4.3  /  TBili  0.8  /  DBili  x   /  AST  18  /  ALT  16  /  AlkPhos  46  12-18    PT/INR - ( 18 Dec 2017 16:24 )   PT: 12.6 sec;   INR: 1.15 ratio         PTT - ( 18 Dec 2017 16:24 )  PTT:26.5 sec  Urinalysis Basic - ( 18 Dec 2017 11:35 )    Color: Yellow / Appearance: Clear / S.022 / pH: x  Gluc: x / Ketone: Negative  / Bili: Negative / Urobili: Negative   Blood: x / Protein: 30 mg/dL / Nitrite: Negative   Leuk Esterase: Negative / RBC: 0-2 /HPF / WBC 3-5 /HPF   Sq Epi: x / Non Sq Epi: Few /HPF / Bacteria: Few /HPF

## 2017-12-19 NOTE — DISCHARGE NOTE ADULT - PLAN OF CARE
return to baseline You may take Tylenol, Motrin as needed for mild-moderate pain. Oxycodone every 4 hours for severe pain  Please call Dr. Stevens's office or go to ER if you have fever > 100.4F, severe abdominal pain not relieved by meds, intractable nausea/vomiting, vaginal bleeding soaking 2 pads/hr, or other acute concerns You may take Tylenol, Motrin as needed for mild-moderate pain. Oxycodone every 4 hours for severe pain. Do not return to work until 12/29/17.   Please call Dr. Stevens's office or go to ER if you have fever > 100.4F, severe abdominal pain not relieved by meds, intractable nausea/vomiting, vaginal bleeding soaking 2 pads/hr, or other acute concerns

## 2017-12-19 NOTE — PROGRESS NOTE ADULT - ASSESSMENT
27y female POD#1, s/p LSC evacuation of hemoperitoneum 2/2 ruptured hemorrhagic cyst. Pt is currently stable.

## 2017-12-19 NOTE — DISCHARGE NOTE ADULT - PATIENT PORTAL LINK FT
“You can access the Follow[employer] Patient Portal, offered by [facility], by registering with the following website: [email or web address]”

## 2017-12-19 NOTE — DISCHARGE NOTE ADULT - CARE PLAN
Principal Discharge DX:	Hemoperitoneum, nontraumatic  Goal:	return to baseline  Instructions for follow-up, activity and diet:	You may take Tylenol, Motrin as needed for mild-moderate pain. Oxycodone every 4 hours for severe pain  Please call Dr. Stevens's office or go to ER if you have fever > 100.4F, severe abdominal pain not relieved by meds, intractable nausea/vomiting, vaginal bleeding soaking 2 pads/hr, or other acute concerns Principal Discharge DX:	Hemoperitoneum, nontraumatic  Goal:	return to baseline  Instructions for follow-up, activity and diet:	You may take Tylenol, Motrin as needed for mild-moderate pain. Oxycodone every 4 hours for severe pain. Do not return to work until 12/29/17.   Please call Dr. Stevens's office or go to ER if you have fever > 100.4F, severe abdominal pain not relieved by meds, intractable nausea/vomiting, vaginal bleeding soaking 2 pads/hr, or other acute concerns

## 2017-12-20 ENCOUNTER — TRANSCRIPTION ENCOUNTER (OUTPATIENT)
Age: 27
End: 2017-12-20

## 2018-01-02 ENCOUNTER — TRANSCRIPTION ENCOUNTER (OUTPATIENT)
Age: 28
End: 2018-01-02

## 2018-05-10 ENCOUNTER — APPOINTMENT (OUTPATIENT)
Dept: GASTROENTEROLOGY | Facility: CLINIC | Age: 28
End: 2018-05-10

## 2018-07-13 ENCOUNTER — OUTPATIENT (OUTPATIENT)
Dept: OUTPATIENT SERVICES | Facility: HOSPITAL | Age: 28
LOS: 1 days | End: 2018-07-13
Payer: COMMERCIAL

## 2018-07-13 ENCOUNTER — APPOINTMENT (OUTPATIENT)
Dept: ULTRASOUND IMAGING | Facility: IMAGING CENTER | Age: 28
End: 2018-07-13
Payer: COMMERCIAL

## 2018-07-13 DIAGNOSIS — Z87.42 PERSONAL HISTORY OF OTHER DISEASES OF THE FEMALE GENITAL TRACT: ICD-10-CM

## 2018-07-13 PROCEDURE — 76856 US EXAM PELVIC COMPLETE: CPT

## 2018-07-13 PROCEDURE — 76856 US EXAM PELVIC COMPLETE: CPT | Mod: 26

## 2018-07-13 PROCEDURE — 76830 TRANSVAGINAL US NON-OB: CPT | Mod: 26

## 2018-07-13 PROCEDURE — 76830 TRANSVAGINAL US NON-OB: CPT

## 2018-11-16 ENCOUNTER — RESULT REVIEW (OUTPATIENT)
Age: 28
End: 2018-11-16

## 2019-02-26 ENCOUNTER — ASOB RESULT (OUTPATIENT)
Age: 29
End: 2019-02-26

## 2019-02-26 ENCOUNTER — APPOINTMENT (OUTPATIENT)
Dept: ANTEPARTUM | Facility: CLINIC | Age: 29
End: 2019-02-26
Payer: COMMERCIAL

## 2019-02-26 ENCOUNTER — LABORATORY RESULT (OUTPATIENT)
Age: 29
End: 2019-02-26

## 2019-02-26 PROCEDURE — 76813 OB US NUCHAL MEAS 1 GEST: CPT

## 2019-02-26 PROCEDURE — 36416 COLLJ CAPILLARY BLOOD SPEC: CPT

## 2019-02-26 PROCEDURE — 76801 OB US < 14 WKS SINGLE FETUS: CPT

## 2019-02-28 ENCOUNTER — TRANSCRIPTION ENCOUNTER (OUTPATIENT)
Age: 29
End: 2019-02-28

## 2019-04-18 ENCOUNTER — ASOB RESULT (OUTPATIENT)
Age: 29
End: 2019-04-18

## 2019-04-18 ENCOUNTER — APPOINTMENT (OUTPATIENT)
Dept: ANTEPARTUM | Facility: CLINIC | Age: 29
End: 2019-04-18
Payer: COMMERCIAL

## 2019-04-18 PROCEDURE — 76811 OB US DETAILED SNGL FETUS: CPT

## 2019-04-18 PROCEDURE — 76817 TRANSVAGINAL US OBSTETRIC: CPT

## 2019-05-02 ENCOUNTER — EMERGENCY (EMERGENCY)
Facility: HOSPITAL | Age: 29
LOS: 1 days | Discharge: HOME | End: 2019-05-02
Attending: EMERGENCY MEDICINE
Payer: COMMERCIAL

## 2019-05-02 VITALS
DIASTOLIC BLOOD PRESSURE: 76 MMHG | RESPIRATION RATE: 18 BRPM | SYSTOLIC BLOOD PRESSURE: 115 MMHG | OXYGEN SATURATION: 99 % | HEART RATE: 100 BPM

## 2019-05-02 VITALS
SYSTOLIC BLOOD PRESSURE: 163 MMHG | OXYGEN SATURATION: 100 % | TEMPERATURE: 98 F | WEIGHT: 141.98 LBS | HEART RATE: 100 BPM | RESPIRATION RATE: 18 BRPM | HEIGHT: 64 IN | DIASTOLIC BLOOD PRESSURE: 101 MMHG

## 2019-05-02 PROCEDURE — 31575 DIAGNOSTIC LARYNGOSCOPY: CPT

## 2019-05-02 PROCEDURE — 69000 DRG XTRNL EAR ABSC/HEM SMPL: CPT | Mod: LT

## 2019-05-02 PROCEDURE — 99285 EMERGENCY DEPT VISIT HI MDM: CPT | Mod: 25

## 2019-05-02 PROCEDURE — 99283 EMERGENCY DEPT VISIT LOW MDM: CPT

## 2019-05-02 RX ADMIN — Medication 300 MILLIGRAM(S): at 22:37

## 2019-05-02 NOTE — ED PROVIDER NOTE - CARE PROVIDER_API CALL
Gabriel Blanco)  Otolaryngology  [address] [place] [address], [address]  [place] [address]  Phone: [phone]  Fax: [phone]  Follow Up Time:

## 2019-05-02 NOTE — ED ADULT NURSE NOTE - OBJECTIVE STATEMENT
pt had a piercing that got infected in her left ear.  she has been taking keflex for the infection and her pmd told her to come to the er for a high white count,  she reports that the area is itchy and has bumps  pt is 22 weeks pregnant

## 2019-05-02 NOTE — ED PROVIDER NOTE - PROGRESS NOTE DETAILS
Evaluated by ENT. Lanced. Will switch ABX to Clinda. Pregnancy prevents quinolone. Will f/u c/ ENT 1 week.

## 2019-05-02 NOTE — ED PROVIDER NOTE - PHYSICAL EXAMINATION
On exam appears well and comfortable. VSs noted, head NC/AT, L ear edematous, erythematous, warm, tender c/ small nodular lesion on posterior-superior aspect, neck supple, breathing c/ ease and neurologically intact.

## 2019-05-02 NOTE — CONSULT NOTE ADULT - ASSESSMENT
Assessment:  The patient is a 29 year old 22 weeks pregnant F pt with no significant past medical history who presents to the emergency room at [facility] with a chief complaint of left ear pain and itching for the past several hours. Ddx left auricular perichondritis and left ear abscess    Plan:  1) incision and drainage of left ear abscess at bedside  2) clindamycin PO x 7 days  3) abx ointment to ear bid x 3 days  4) f/u with ENT in 1 week

## 2019-05-02 NOTE — CONSULT NOTE ADULT - ENMT COMMENTS
Flexible fiberoptic Laryngoscopy: clear nasopharynx to glottis, tvc mobile b/l, airway widely patent

## 2019-05-02 NOTE — ED ADULT NURSE NOTE - NSIMPLEMENTINTERV_GEN_ALL_ED
Implemented All Universal Safety Interventions:  [employer] to call system. Call bell, personal items and telephone within reach. Instruct patient to call for assistance. Room bathroom lighting operational. Non-slip footwear when patient is off stretcher. Physically safe environment: no spills, clutter or unnecessary equipment. Stretcher in lowest position, wheels locked, appropriate side rails in place.

## 2019-05-02 NOTE — CHART NOTE - NSCHARTNOTEFT_GEN_A_CORE
[facility]  Head & Neck Surgery Procedure Note    Name:                   Suzette Falcon	               Surgeon:    Gabriel Blanco MD  					  Date of Procedure: 05/02/2019                         Assistant:  None    Record Number:	 [id number]                             Anesthesia:  Local Anesthesia       Preoperative diagnosis:	Dysphagia, unspecified (R13.10)  	  Postoperative diagnosis:	Dysphagia, unspecified (R13.10)    Procedure:		Flexible Fiberoptic Laryngoscopy  (95670)    INDICATION:  The patient is a 29 year old 22 weeks pregnant F pt with no significant past medical history who presents to the emergency room at [facility] with a chief complaint of left ear pain and itching for the past several hours. The patient has had a left ear piercing for the past year. She has not taken any medication for the pain. Also notes that there was bump to the piercing area x3 weeks. Pt used a needle and sterilized it with fire. Pt also wiped the area with alcohol wipes and tried to pop it with the needle but nothing came out. Pt saw PMD 3 days ago and was Dx'd with infection and Rx'd Keflex. Pt started Keflex 2 days ago but states that the pain is worsening. Also applied silvadene and bacitracin on the area. Went to PMD today and he was concerned that the site was not healing. Completed CBC which noted elevated WBC at 15. PMD referred pt to the ED for further evaluation and blood culture. Patient also complains of nasal stuffiness and postnasal drip and occasional dysphagia    PROCEDURE: The patient was seen and her bilateral nasal cavities were prepped and sprayed with topical anesthesia of neosynephrine.   Following this, a fiberoptic flexible laryngoscope was passed into the left nasal cavity, and then slowly and meticulously moved posteriorly to the nasopharynx.  There was no evidence of clotted blood within the left anterior and posterior superior lateral nasal wall. There was clear mucous within the nasal cavity.  Once at nasopharynx the skull base and lateral walls of the eustachian tubes were examined for lesions and masses.  There was no blood or masses within the nasopharynx. There was mild prominence of the nasopharyngeal soft tissue consistent with inflammatory reaction.  The fiberoptic endoscope was then carefully directed inferiorly and moved to the hypopharynx and glottis.  There was no fullness of the base of tongue.  There was 1-2+ prominence of the tonsils bilaterally (equally) and without exudate. There was no evidence of retropharyngeal erythema or edema.  There was mild  diffuse erythema  of the pharyngeal wall and supraglottic structure consistent with GERD.  The true vocal cords appeared to be mobile bilaterally.  There was no evidence of foreign body or pooling of secretions, or obvious aspiration or penetration of liquid into the glottis.  The airway was widely patent. The patient tolerated the procedure well..

## 2019-05-02 NOTE — ED PROVIDER NOTE - ATTENDING CONTRIBUTION TO CARE
28 y/o female with the above documented history and HPI who on exam appears well and comfortable. VSs noted, head NC/AT, L ear edematous, erythematous, warm, tender c/ small nodular lesion on posterior-superior aspect, neck supple, breathing c/ ease and neurologically intact. ENT to evaluate. Their plan will be ours.

## 2019-05-02 NOTE — CONSULT NOTE ADULT - COMMENTS
Vital Signs Last 24 Hrs  T(C): 36.7 (02 May 2019 20:09), Max: 36.7 (02 May 2019 20:09)  T(F): 98 (02 May 2019 20:09), Max: 98 (02 May 2019 20:09)  HR: 100 (02 May 2019 20:58) (99 - 100)  BP: 115/76 (02 May 2019 20:58) (115/76 - 163/101)  BP(mean): --  RR: 18 (02 May 2019 20:58) (18 - 18)  SpO2: 99% (02 May 2019 20:58) (99% - 100%)

## 2019-05-02 NOTE — CONSULT NOTE ADULT - SUBJECTIVE AND OBJECTIVE BOX
HPI: The patient is a 29 year old 22 weeks pregnant F pt with no significant past medical history who presents to the emergency room at [facility] with a chief complaint of left ear pain and itching for the past several hours. The patient has had a left ear piercing for the past year. She has not taken any medication for the pain. Also notes that there was bump to the piercing area x3 weeks. Pt used a needle and sterilized it with fire. Pt also wiped the area with alcohol wipes and tried to pop it with the needle but nothing came out. Pt saw PMD 3 days ago and was Dx'd with infection and Rx'd Keflex. Pt started Keflex 2 days ago but states that the pain is worsening. Also applied silvadene and bacitracin on the area. Went to PMD today and he was concerned that the site was not healing. Completed CBC which noted elevated WBC at 15. PMD referred pt to the ED for further evaluation and blood culture. Patient also complains of nasal stuffiness and postnasal drip and occasional dysphagia	     HIV Status:  · Offered: Declined	    PAST MEDICAL/SURGICAL/FAMILY/SOCIAL HISTORY:    Past Medical History:  No pertinent past medical history.     Past Surgical History:  No significant past surgical history.     Tobacco Usage:  · Tobacco Usage	Unknown if ever smoked	    · Attestation Comment: I have reviewed and confirmed nurses' notes for patient's medications, allergies, medical history, and surgical history.	    ALLERGIES AND HOME MEDICATIONS:   Allergies:        Allergies:  	No Known Allergies:     Home Medications:   * Patient Currently Takes Medications as of 19-Dec-2017 10:43 documented in Structured Notes  · 	ibuprofen 600 mg oral tablet: 1 tab(s) orally every 6 hours   · 	oxyCODONE 5 mg oral tablet: 1 tab(s) orally every 4 hours, As needed, Severe Pain (7 - 10) MDD:8

## 2019-05-02 NOTE — ED PROVIDER NOTE - OBJECTIVE STATEMENT
Sal Albright) : 28 y/o 22 weeks pregnant F pt with no significant PMHx c/o left ear pain and itching from old ear piercing which was years ago. Have not taken any medication for the pain. Also notes that there was bump to the piercing area x3 weeks. Pt used a needle and sterilized it with fire. Pt also wiped the area with alcohol wipes and tried to pop it with the needle but nothing came out. Pt saw PMD 3 days ago and was Dx'd with infection and Rx'd Keflex. Pt started Keflex 2 days ago but states that the pain is worsening. Also applied silvadene and bacitracin on the area. Went to PMD today and he was concerned that the site was not healing. Completed CBC which noted elevated WBC at 15. PMD referred pt to the ED for further evaluation and blood culture. Pt's OBGYN also referred pt to the ED for tachycardia to 120. Denies fevers or any other complaints. LMP: NOV 2018  PMD: Dr. Moss   OBGYN: Dr. Stevens

## 2019-05-02 NOTE — CHART NOTE - NSCHARTNOTEFT_GEN_A_CORE
[facility]  Head & Neck Surgery Procedure Note      Name:                   Suzette Falcon	               Surgeon:    Gabriel Blanco MD  					  Date of Procedure: 05/02/2019                         Assistant:  None    Record Number:	 [id number]                             Anesthesia:  Local Anesthesia     Preoperative diagnosis:	Abscess of Left External Ear Abscess (H60.01)    Postoperative diagnosis:	Same    Procedure:	Incision & drainage abscess External Ear – (09254)    INDICATION: The patient is a 29 year old 22 weeks pregnant F pt with no significant past medical history who presents to the emergency room at [facility] with a chief complaint of left ear pain and itching for the past several hours. The patient has had a left ear piercing for the past year. She has not taken any medication for the pain. Also notes that there was bump to the piercing area x3 weeks. Pt used a needle and sterilized it with fire. Pt also wiped the area with alcohol wipes and tried to pop it with the needle but nothing came out. Pt saw PMD 3 days ago and was Dx'd with infection and Rx'd Keflex. Pt started Keflex 2 days ago but states that the pain is worsening. Also applied silvadene and bacitracin on the area. Went to PMD today and he was concerned that the site was not healing. Completed CBC which noted elevated WBC at 15. PMD referred pt to the ED for further evaluation and blood culture. Patient also complains of nasal stuffiness and postnasal drip and occasional dysphagia    PROCEDURE:  After informed consent was obtained from the patient,  the patient was placed supine on the patient bed.  Attention was turned to the left ear. The left ear and face was prepped with betadyne and draped in a sterile fashion.  Next, approximately 1 cc of 1% lidocaine with 1/100K parts epinephrine was placed in the left pinna and helix area to anesthetize the area. A regional block was performed, anesthetizing the greater auricular, postauricular and V3 cutaneous nerves.  Attention was first turned to posterior pinna portion of the ear.  Using a number 11 blade an incision was made over the posterior aspect of the pinna and extending into the perichondrium.  Using dissecting hemostats the dissection was carried down through the cartilaginous junction and into the subcutansous fat. An abscess pocket was encountered and copious amounts of pus was expressed from the wound.  The wound was probed with dissecting hemostats and blood and purulence was expressed.  There was minimal bleeding which was controlled manual pressure.  The wound was irrigated with normal saline.  A dressing was placed over the wound using gauze padding.  The patient tolerated the procedure well with no complications.

## 2019-06-06 ENCOUNTER — ASOB RESULT (OUTPATIENT)
Age: 29
End: 2019-06-06

## 2019-06-06 ENCOUNTER — APPOINTMENT (OUTPATIENT)
Dept: ANTEPARTUM | Facility: CLINIC | Age: 29
End: 2019-06-06
Payer: COMMERCIAL

## 2019-06-06 PROCEDURE — 76816 OB US FOLLOW-UP PER FETUS: CPT

## 2019-07-11 ENCOUNTER — APPOINTMENT (OUTPATIENT)
Dept: ANTEPARTUM | Facility: CLINIC | Age: 29
End: 2019-07-11
Payer: COMMERCIAL

## 2019-07-11 ENCOUNTER — ASOB RESULT (OUTPATIENT)
Age: 29
End: 2019-07-11

## 2019-07-11 PROCEDURE — 76816 OB US FOLLOW-UP PER FETUS: CPT

## 2019-07-11 PROCEDURE — 76820 UMBILICAL ARTERY ECHO: CPT

## 2019-07-11 PROCEDURE — 76819 FETAL BIOPHYS PROFIL W/O NST: CPT

## 2019-07-18 ENCOUNTER — ASOB RESULT (OUTPATIENT)
Age: 29
End: 2019-07-18

## 2019-07-18 ENCOUNTER — APPOINTMENT (OUTPATIENT)
Dept: ANTEPARTUM | Facility: CLINIC | Age: 29
End: 2019-07-18
Payer: COMMERCIAL

## 2019-07-18 PROCEDURE — 76819 FETAL BIOPHYS PROFIL W/O NST: CPT

## 2019-07-18 PROCEDURE — 76820 UMBILICAL ARTERY ECHO: CPT

## 2019-07-25 ENCOUNTER — ASOB RESULT (OUTPATIENT)
Age: 29
End: 2019-07-25

## 2019-07-25 ENCOUNTER — APPOINTMENT (OUTPATIENT)
Dept: ANTEPARTUM | Facility: CLINIC | Age: 29
End: 2019-07-25

## 2019-07-25 ENCOUNTER — OUTPATIENT (OUTPATIENT)
Dept: OUTPATIENT SERVICES | Facility: HOSPITAL | Age: 29
LOS: 1 days | End: 2019-07-25
Payer: COMMERCIAL

## 2019-07-25 DIAGNOSIS — Z3A.00 WEEKS OF GESTATION OF PREGNANCY NOT SPECIFIED: ICD-10-CM

## 2019-07-25 DIAGNOSIS — O26.899 OTHER SPECIFIED PREGNANCY RELATED CONDITIONS, UNSPECIFIED TRIMESTER: ICD-10-CM

## 2019-07-25 PROCEDURE — 76818 FETAL BIOPHYS PROFILE W/NST: CPT

## 2019-07-25 PROCEDURE — 76816 OB US FOLLOW-UP PER FETUS: CPT

## 2019-07-25 PROCEDURE — 76820 UMBILICAL ARTERY ECHO: CPT

## 2019-07-25 PROCEDURE — G0463: CPT

## 2019-07-25 PROCEDURE — 76816 OB US FOLLOW-UP PER FETUS: CPT | Mod: 26

## 2019-07-25 PROCEDURE — 76820 UMBILICAL ARTERY ECHO: CPT | Mod: 26

## 2019-07-25 PROCEDURE — 76818 FETAL BIOPHYS PROFILE W/NST: CPT | Mod: 26

## 2019-07-25 PROCEDURE — 59025 FETAL NON-STRESS TEST: CPT

## 2019-08-01 ENCOUNTER — ASOB RESULT (OUTPATIENT)
Age: 29
End: 2019-08-01

## 2019-08-01 ENCOUNTER — APPOINTMENT (OUTPATIENT)
Dept: ANTEPARTUM | Facility: CLINIC | Age: 29
End: 2019-08-01
Payer: COMMERCIAL

## 2019-08-01 ENCOUNTER — OUTPATIENT (OUTPATIENT)
Dept: OUTPATIENT SERVICES | Facility: HOSPITAL | Age: 29
LOS: 1 days | End: 2019-08-01
Payer: COMMERCIAL

## 2019-08-01 DIAGNOSIS — Z01.818 ENCOUNTER FOR OTHER PREPROCEDURAL EXAMINATION: ICD-10-CM

## 2019-08-01 PROCEDURE — 76818 FETAL BIOPHYS PROFILE W/NST: CPT | Mod: 26

## 2019-08-01 PROCEDURE — 76818 FETAL BIOPHYS PROFILE W/NST: CPT

## 2019-08-08 ENCOUNTER — ASOB RESULT (OUTPATIENT)
Age: 29
End: 2019-08-08

## 2019-08-08 ENCOUNTER — APPOINTMENT (OUTPATIENT)
Dept: ANTEPARTUM | Facility: CLINIC | Age: 29
End: 2019-08-08
Payer: COMMERCIAL

## 2019-08-08 PROCEDURE — 76816 OB US FOLLOW-UP PER FETUS: CPT

## 2019-08-08 PROCEDURE — 76820 UMBILICAL ARTERY ECHO: CPT

## 2019-08-15 ENCOUNTER — ASOB RESULT (OUTPATIENT)
Age: 29
End: 2019-08-15

## 2019-08-15 ENCOUNTER — APPOINTMENT (OUTPATIENT)
Dept: ANTEPARTUM | Facility: CLINIC | Age: 29
End: 2019-08-15
Payer: COMMERCIAL

## 2019-08-15 PROCEDURE — 76819 FETAL BIOPHYS PROFIL W/O NST: CPT

## 2019-08-22 ENCOUNTER — APPOINTMENT (OUTPATIENT)
Dept: ANTEPARTUM | Facility: CLINIC | Age: 29
End: 2019-08-22
Payer: COMMERCIAL

## 2019-08-22 ENCOUNTER — ASOB RESULT (OUTPATIENT)
Age: 29
End: 2019-08-22

## 2019-08-22 PROCEDURE — 76819 FETAL BIOPHYS PROFIL W/O NST: CPT

## 2019-08-22 PROCEDURE — 76820 UMBILICAL ARTERY ECHO: CPT

## 2019-08-22 PROCEDURE — 76816 OB US FOLLOW-UP PER FETUS: CPT

## 2019-08-29 ENCOUNTER — APPOINTMENT (OUTPATIENT)
Dept: ANTEPARTUM | Facility: CLINIC | Age: 29
End: 2019-08-29
Payer: COMMERCIAL

## 2019-08-29 ENCOUNTER — INPATIENT (INPATIENT)
Facility: HOSPITAL | Age: 29
LOS: 2 days | Discharge: HOME | End: 2019-09-01
Attending: OBSTETRICS & GYNECOLOGY | Admitting: OBSTETRICS & GYNECOLOGY
Payer: COMMERCIAL

## 2019-08-29 ENCOUNTER — ASOB RESULT (OUTPATIENT)
Age: 29
End: 2019-08-29

## 2019-08-29 VITALS — HEIGHT: 64 IN | WEIGHT: 167.55 LBS

## 2019-08-29 DIAGNOSIS — O26.899 OTHER SPECIFIED PREGNANCY RELATED CONDITIONS, UNSPECIFIED TRIMESTER: ICD-10-CM

## 2019-08-29 DIAGNOSIS — Z3A.00 WEEKS OF GESTATION OF PREGNANCY NOT SPECIFIED: ICD-10-CM

## 2019-08-29 DIAGNOSIS — Z34.80 ENCOUNTER FOR SUPERVISION OF OTHER NORMAL PREGNANCY, UNSPECIFIED TRIMESTER: ICD-10-CM

## 2019-08-29 LAB
ALBUMIN SERPL ELPH-MCNC: 3.5 G/DL — SIGNIFICANT CHANGE UP (ref 3.3–5)
ALP SERPL-CCNC: 181 U/L — HIGH (ref 40–120)
ALT FLD-CCNC: 17 U/L — SIGNIFICANT CHANGE UP (ref 10–45)
APPEARANCE UR: CLEAR — SIGNIFICANT CHANGE UP
APTT BLD: 27.5 SEC — SIGNIFICANT CHANGE UP (ref 27.5–36.3)
AST SERPL-CCNC: 26 U/L — SIGNIFICANT CHANGE UP (ref 10–40)
BASOPHILS # BLD AUTO: 0 K/UL — SIGNIFICANT CHANGE UP (ref 0–0.2)
BASOPHILS NFR BLD AUTO: 0.3 % — SIGNIFICANT CHANGE UP (ref 0–2)
BILIRUB SERPL-MCNC: 0.3 MG/DL — SIGNIFICANT CHANGE UP (ref 0.2–1.2)
BILIRUB UR-MCNC: NEGATIVE — SIGNIFICANT CHANGE UP
BLD GP AB SCN SERPL QL: NEGATIVE — SIGNIFICANT CHANGE UP
BUN SERPL-MCNC: 17 MG/DL — SIGNIFICANT CHANGE UP (ref 7–23)
CALCIUM SERPL-MCNC: 9.2 MG/DL — SIGNIFICANT CHANGE UP (ref 8.4–10.5)
CHLORIDE SERPL-SCNC: 106 MMOL/L — SIGNIFICANT CHANGE UP (ref 96–108)
CO2 SERPL-SCNC: 16 MMOL/L — LOW (ref 22–31)
COLOR SPEC: COLORLESS — SIGNIFICANT CHANGE UP
CREAT ?TM UR-MCNC: 32 MG/DL — SIGNIFICANT CHANGE UP
CREAT SERPL-MCNC: 0.89 MG/DL — SIGNIFICANT CHANGE UP (ref 0.5–1.3)
DIFF PNL FLD: ABNORMAL
EOSINOPHIL # BLD AUTO: 0 K/UL — SIGNIFICANT CHANGE UP (ref 0–0.5)
EOSINOPHIL NFR BLD AUTO: 0.3 % — SIGNIFICANT CHANGE UP (ref 0–6)
FIBRINOGEN PPP-MCNC: 555 MG/DL — HIGH (ref 350–510)
GLUCOSE SERPL-MCNC: 76 MG/DL — SIGNIFICANT CHANGE UP (ref 70–99)
GLUCOSE UR QL: NEGATIVE — SIGNIFICANT CHANGE UP
HCT VFR BLD CALC: 43.1 % — SIGNIFICANT CHANGE UP (ref 34.5–45)
HGB BLD-MCNC: 14.3 G/DL — SIGNIFICANT CHANGE UP (ref 11.5–15.5)
INR BLD: 0.86 RATIO — LOW (ref 0.88–1.16)
KETONES UR-MCNC: NEGATIVE — SIGNIFICANT CHANGE UP
LDH SERPL L TO P-CCNC: 215 U/L — SIGNIFICANT CHANGE UP (ref 50–242)
LEUKOCYTE ESTERASE UR-ACNC: ABNORMAL
LYMPHOCYTES # BLD AUTO: 28.3 % — SIGNIFICANT CHANGE UP (ref 13–44)
LYMPHOCYTES # BLD AUTO: 3.4 K/UL — HIGH (ref 1–3.3)
MCHC RBC-ENTMCNC: 30.5 PG — SIGNIFICANT CHANGE UP (ref 27–34)
MCHC RBC-ENTMCNC: 33.2 GM/DL — SIGNIFICANT CHANGE UP (ref 32–36)
MCV RBC AUTO: 91.9 FL — SIGNIFICANT CHANGE UP (ref 80–100)
MONOCYTES # BLD AUTO: 0.7 K/UL — SIGNIFICANT CHANGE UP (ref 0–0.9)
MONOCYTES NFR BLD AUTO: 6.2 % — SIGNIFICANT CHANGE UP (ref 2–14)
NEUTROPHILS # BLD AUTO: 7.8 K/UL — HIGH (ref 1.8–7.4)
NEUTROPHILS NFR BLD AUTO: 64.9 % — SIGNIFICANT CHANGE UP (ref 43–77)
NITRITE UR-MCNC: NEGATIVE — SIGNIFICANT CHANGE UP
PH UR: 7 — SIGNIFICANT CHANGE UP (ref 5–8)
PLATELET # BLD AUTO: 173 K/UL — SIGNIFICANT CHANGE UP (ref 150–400)
POTASSIUM SERPL-MCNC: 4.3 MMOL/L — SIGNIFICANT CHANGE UP (ref 3.5–5.3)
POTASSIUM SERPL-SCNC: 4.3 MMOL/L — SIGNIFICANT CHANGE UP (ref 3.5–5.3)
PROT ?TM UR-MCNC: 5 MG/DL — SIGNIFICANT CHANGE UP (ref 0–12)
PROT SERPL-MCNC: 6.6 G/DL — SIGNIFICANT CHANGE UP (ref 6–8.3)
PROT UR-MCNC: NEGATIVE — SIGNIFICANT CHANGE UP
PROT/CREAT UR-RTO: 0.2 RATIO — SIGNIFICANT CHANGE UP (ref 0–0.2)
PROTHROM AB SERPL-ACNC: 9.7 SEC — LOW (ref 10–12.9)
RBC # BLD: 4.69 M/UL — SIGNIFICANT CHANGE UP (ref 3.8–5.2)
RBC # FLD: 14 % — SIGNIFICANT CHANGE UP (ref 10.3–14.5)
RH IG SCN BLD-IMP: POSITIVE — SIGNIFICANT CHANGE UP
SODIUM SERPL-SCNC: 137 MMOL/L — SIGNIFICANT CHANGE UP (ref 135–145)
SP GR SPEC: 1.01 — LOW (ref 1.01–1.02)
URATE SERPL-MCNC: 7 MG/DL — SIGNIFICANT CHANGE UP (ref 2.5–7)
UROBILINOGEN FLD QL: NEGATIVE — SIGNIFICANT CHANGE UP
WBC # BLD: 12 K/UL — HIGH (ref 3.8–10.5)
WBC # FLD AUTO: 12 K/UL — HIGH (ref 3.8–10.5)

## 2019-08-29 PROCEDURE — 76820 UMBILICAL ARTERY ECHO: CPT

## 2019-08-29 PROCEDURE — 76819 FETAL BIOPHYS PROFIL W/O NST: CPT

## 2019-08-29 RX ORDER — SODIUM CHLORIDE 9 MG/ML
1000 INJECTION, SOLUTION INTRAVENOUS
Refills: 0 | Status: DISCONTINUED | OUTPATIENT
Start: 2019-08-29 | End: 2019-08-30

## 2019-08-29 RX ORDER — OXYTOCIN 10 UNIT/ML
4 VIAL (ML) INJECTION
Qty: 30 | Refills: 0 | Status: DISCONTINUED | OUTPATIENT
Start: 2019-08-29 | End: 2019-08-30

## 2019-08-29 RX ORDER — OXYTOCIN 10 UNIT/ML
333.33 VIAL (ML) INJECTION
Qty: 20 | Refills: 0 | Status: DISCONTINUED | OUTPATIENT
Start: 2019-08-29 | End: 2019-09-01

## 2019-08-29 RX ORDER — CITRIC ACID/SODIUM CITRATE 300-500 MG
15 SOLUTION, ORAL ORAL EVERY 6 HOURS
Refills: 0 | Status: DISCONTINUED | OUTPATIENT
Start: 2019-08-29 | End: 2019-08-30

## 2019-08-29 RX ADMIN — Medication 4 MILLIUNIT(S)/MIN: at 19:05

## 2019-08-30 LAB — T PALLIDUM AB TITR SER: NEGATIVE — SIGNIFICANT CHANGE UP

## 2019-08-30 RX ORDER — IBUPROFEN 200 MG
600 TABLET ORAL EVERY 6 HOURS
Refills: 0 | Status: DISCONTINUED | OUTPATIENT
Start: 2019-08-30 | End: 2019-09-01

## 2019-08-30 RX ORDER — SODIUM CHLORIDE 9 MG/ML
3 INJECTION INTRAMUSCULAR; INTRAVENOUS; SUBCUTANEOUS EVERY 8 HOURS
Refills: 0 | Status: DISCONTINUED | OUTPATIENT
Start: 2019-08-30 | End: 2019-09-01

## 2019-08-30 RX ORDER — DOCUSATE SODIUM 100 MG
100 CAPSULE ORAL
Refills: 0 | Status: DISCONTINUED | OUTPATIENT
Start: 2019-08-30 | End: 2019-09-01

## 2019-08-30 RX ORDER — OXYTOCIN 10 UNIT/ML
333.33 VIAL (ML) INJECTION
Qty: 20 | Refills: 0 | Status: DISCONTINUED | OUTPATIENT
Start: 2019-08-30 | End: 2019-09-01

## 2019-08-30 RX ORDER — TETANUS TOXOID, REDUCED DIPHTHERIA TOXOID AND ACELLULAR PERTUSSIS VACCINE, ADSORBED 5; 2.5; 8; 8; 2.5 [IU]/.5ML; [IU]/.5ML; UG/.5ML; UG/.5ML; UG/.5ML
0.5 SUSPENSION INTRAMUSCULAR ONCE
Refills: 0 | Status: DISCONTINUED | OUTPATIENT
Start: 2019-08-30 | End: 2019-09-01

## 2019-08-30 RX ORDER — ACETAMINOPHEN 500 MG
975 TABLET ORAL
Refills: 0 | Status: DISCONTINUED | OUTPATIENT
Start: 2019-08-30 | End: 2019-09-01

## 2019-08-30 RX ORDER — PRAMOXINE HYDROCHLORIDE 150 MG/15G
1 AEROSOL, FOAM RECTAL EVERY 4 HOURS
Refills: 0 | Status: DISCONTINUED | OUTPATIENT
Start: 2019-08-30 | End: 2019-09-01

## 2019-08-30 RX ORDER — DIPHENHYDRAMINE HCL 50 MG
25 CAPSULE ORAL EVERY 6 HOURS
Refills: 0 | Status: DISCONTINUED | OUTPATIENT
Start: 2019-08-30 | End: 2019-09-01

## 2019-08-30 RX ORDER — MAGNESIUM HYDROXIDE 400 MG/1
30 TABLET, CHEWABLE ORAL
Refills: 0 | Status: DISCONTINUED | OUTPATIENT
Start: 2019-08-30 | End: 2019-09-01

## 2019-08-30 RX ORDER — OXYCODONE HYDROCHLORIDE 5 MG/1
5 TABLET ORAL
Refills: 0 | Status: DISCONTINUED | OUTPATIENT
Start: 2019-08-30 | End: 2019-09-01

## 2019-08-30 RX ORDER — SIMETHICONE 80 MG/1
80 TABLET, CHEWABLE ORAL EVERY 4 HOURS
Refills: 0 | Status: DISCONTINUED | OUTPATIENT
Start: 2019-08-30 | End: 2019-09-01

## 2019-08-30 RX ORDER — LANOLIN
1 OINTMENT (GRAM) TOPICAL EVERY 6 HOURS
Refills: 0 | Status: DISCONTINUED | OUTPATIENT
Start: 2019-08-30 | End: 2019-09-01

## 2019-08-30 RX ORDER — OXYCODONE HYDROCHLORIDE 5 MG/1
5 TABLET ORAL ONCE
Refills: 0 | Status: DISCONTINUED | OUTPATIENT
Start: 2019-08-30 | End: 2019-09-01

## 2019-08-30 RX ORDER — DIBUCAINE 1 %
1 OINTMENT (GRAM) RECTAL EVERY 6 HOURS
Refills: 0 | Status: DISCONTINUED | OUTPATIENT
Start: 2019-08-30 | End: 2019-09-01

## 2019-08-30 RX ORDER — AER TRAVELER 0.5 G/1
1 SOLUTION RECTAL; TOPICAL EVERY 4 HOURS
Refills: 0 | Status: DISCONTINUED | OUTPATIENT
Start: 2019-08-30 | End: 2019-09-01

## 2019-08-30 RX ORDER — IBUPROFEN 200 MG
600 TABLET ORAL EVERY 6 HOURS
Refills: 0 | Status: COMPLETED | OUTPATIENT
Start: 2019-08-30 | End: 2020-07-28

## 2019-08-30 RX ORDER — HYDROCORTISONE 1 %
1 OINTMENT (GRAM) TOPICAL EVERY 6 HOURS
Refills: 0 | Status: DISCONTINUED | OUTPATIENT
Start: 2019-08-30 | End: 2019-09-01

## 2019-08-30 RX ORDER — GLYCERIN ADULT
1 SUPPOSITORY, RECTAL RECTAL AT BEDTIME
Refills: 0 | Status: DISCONTINUED | OUTPATIENT
Start: 2019-08-30 | End: 2019-09-01

## 2019-08-30 RX ORDER — BENZOCAINE 10 %
1 GEL (GRAM) MUCOUS MEMBRANE EVERY 6 HOURS
Refills: 0 | Status: DISCONTINUED | OUTPATIENT
Start: 2019-08-30 | End: 2019-09-01

## 2019-08-30 RX ORDER — KETOROLAC TROMETHAMINE 30 MG/ML
30 SYRINGE (ML) INJECTION ONCE
Refills: 0 | Status: DISCONTINUED | OUTPATIENT
Start: 2019-08-30 | End: 2019-08-30

## 2019-08-30 RX ADMIN — Medication 975 MILLIGRAM(S): at 13:00

## 2019-08-30 RX ADMIN — Medication 600 MILLIGRAM(S): at 15:51

## 2019-08-30 RX ADMIN — Medication 975 MILLIGRAM(S): at 18:00

## 2019-08-30 RX ADMIN — Medication 30 MILLIGRAM(S): at 01:56

## 2019-08-30 RX ADMIN — Medication 975 MILLIGRAM(S): at 12:28

## 2019-08-30 RX ADMIN — Medication 975 MILLIGRAM(S): at 17:21

## 2019-08-30 RX ADMIN — Medication 600 MILLIGRAM(S): at 08:31

## 2019-08-30 RX ADMIN — Medication 600 MILLIGRAM(S): at 21:43

## 2019-08-30 RX ADMIN — Medication 100 MILLIGRAM(S): at 06:24

## 2019-08-30 RX ADMIN — Medication 975 MILLIGRAM(S): at 07:02

## 2019-08-30 RX ADMIN — Medication 100 MILLIGRAM(S): at 08:31

## 2019-08-30 RX ADMIN — Medication 600 MILLIGRAM(S): at 21:13

## 2019-08-30 RX ADMIN — Medication 975 MILLIGRAM(S): at 06:23

## 2019-08-30 RX ADMIN — Medication 600 MILLIGRAM(S): at 09:13

## 2019-08-30 RX ADMIN — Medication 600 MILLIGRAM(S): at 16:30

## 2019-08-31 LAB
HCT VFR BLD CALC: 34.3 % — LOW (ref 34.5–45)
HGB BLD-MCNC: 11.4 G/DL — LOW (ref 11.5–15.5)

## 2019-08-31 RX ADMIN — Medication 975 MILLIGRAM(S): at 05:58

## 2019-08-31 RX ADMIN — Medication 600 MILLIGRAM(S): at 15:33

## 2019-08-31 RX ADMIN — Medication 600 MILLIGRAM(S): at 09:42

## 2019-08-31 RX ADMIN — Medication 600 MILLIGRAM(S): at 22:13

## 2019-08-31 RX ADMIN — Medication 600 MILLIGRAM(S): at 21:43

## 2019-08-31 RX ADMIN — Medication 100 MILLIGRAM(S): at 12:09

## 2019-08-31 RX ADMIN — Medication 600 MILLIGRAM(S): at 04:00

## 2019-08-31 RX ADMIN — MAGNESIUM HYDROXIDE 30 MILLILITER(S): 400 TABLET, CHEWABLE ORAL at 21:43

## 2019-08-31 RX ADMIN — Medication 600 MILLIGRAM(S): at 09:12

## 2019-08-31 RX ADMIN — Medication 975 MILLIGRAM(S): at 12:39

## 2019-08-31 RX ADMIN — Medication 600 MILLIGRAM(S): at 03:30

## 2019-08-31 RX ADMIN — Medication 975 MILLIGRAM(S): at 01:03

## 2019-08-31 RX ADMIN — Medication 975 MILLIGRAM(S): at 00:33

## 2019-08-31 RX ADMIN — Medication 100 MILLIGRAM(S): at 21:44

## 2019-08-31 RX ADMIN — Medication 975 MILLIGRAM(S): at 12:09

## 2019-08-31 RX ADMIN — Medication 975 MILLIGRAM(S): at 06:28

## 2019-08-31 RX ADMIN — Medication 600 MILLIGRAM(S): at 15:03

## 2019-08-31 NOTE — PROGRESS NOTE ADULT - SUBJECTIVE AND OBJECTIVE BOX
OB Progress Note:  PPD#1    S: 28yo  PPD#1 s/p . Patient feels well. Pain is well controlled. Patient had some labile blood pressures, HELLP labs  WNL, P/C ratio: <0.2. She is tolerating a regular diet and passing flatus. She is voiding spontaneously, and ambulating without difficulty. Denies CP/SOB. Denies lightheadedness/dizziness. Denies N/V.      MEDICATIONS  (STANDING):  acetaminophen   Tablet .. 975 milliGRAM(s) Oral <User Schedule>  diphtheria/tetanus/pertussis (acellular) Vaccine (ADAcel) 0.5 milliLiter(s) IntraMuscular once  ibuprofen  Tablet. 600 milliGRAM(s) Oral every 6 hours  oxytocin Infusion 333.333 milliUNIT(s)/Min (1000 mL/Hr) IV Continuous <Continuous>  oxytocin Infusion 333.333 milliUNIT(s)/Min (1000 mL/Hr) IV Continuous <Continuous>  prenatal multivitamin 1 Tablet(s) Oral daily  sodium chloride 0.9% lock flush 3 milliLiter(s) IV Push every 8 hours      Labs:  Blood type: O Positive  Rubella IgG: RPR: Negative                          14.3   12.0<H> >-----------< 173    (  @ 17:50 )             43.1    19 @ 17:50      137  |  106  |  17  ----------------------------<  76  4.3   |  16<L>  |  0.89        Ca    9.2      29 Aug 2019 17:50    TPro  6.6  /  Alb  3.5  /  TBili  0.3  /  DBili  x   /  AST  26  /  ALT  17  /  AlkPhos  181<H>  19 @ 17:50        Physical Exam:    Vital Signs Last 24 Hrs  T(C): 36.6 (31 Aug 2019 05:00), Max: 36.9 (30 Aug 2019 09:30)  T(F): 97.8 (31 Aug 2019 05:00), Max: 98.4 (30 Aug 2019 09:30)  HR: 68 (31 Aug 2019 05:00) (68 - 87)  BP: 130/86 (31 Aug 2019 05:00) (104/67 - 130/89)  RR: 18 (31 Aug 2019 05:00) (18 - 18)  SpO2: 96% (31 Aug 2019 05:00) (96% - 98%)    General: NAD  Abdomen: soft, non-tender, non-distended, fundus firm  Vaginal: Lochia wnl  Extremities: No erythema/edema

## 2019-08-31 NOTE — PROGRESS NOTE ADULT - PROBLEM SELECTOR PLAN 1
- Pain well controlled, continue current pain regimen  - Increase ambulation, SCDs when not ambulating  - Monitor VS  - Continue regular diet    Shaheen Martines PGY1

## 2019-09-01 ENCOUNTER — TRANSCRIPTION ENCOUNTER (OUTPATIENT)
Age: 29
End: 2019-09-01

## 2019-09-01 VITALS
DIASTOLIC BLOOD PRESSURE: 82 MMHG | TEMPERATURE: 98 F | RESPIRATION RATE: 18 BRPM | HEART RATE: 82 BPM | SYSTOLIC BLOOD PRESSURE: 125 MMHG

## 2019-09-01 PROCEDURE — 83615 LACTATE (LD) (LDH) ENZYME: CPT

## 2019-09-01 PROCEDURE — 59050 FETAL MONITOR W/REPORT: CPT

## 2019-09-01 PROCEDURE — 86850 RBC ANTIBODY SCREEN: CPT

## 2019-09-01 PROCEDURE — 86900 BLOOD TYPING SEROLOGIC ABO: CPT

## 2019-09-01 PROCEDURE — 85730 THROMBOPLASTIN TIME PARTIAL: CPT

## 2019-09-01 PROCEDURE — 84156 ASSAY OF PROTEIN URINE: CPT

## 2019-09-01 PROCEDURE — 86901 BLOOD TYPING SEROLOGIC RH(D): CPT

## 2019-09-01 PROCEDURE — 85610 PROTHROMBIN TIME: CPT

## 2019-09-01 PROCEDURE — 86780 TREPONEMA PALLIDUM: CPT

## 2019-09-01 PROCEDURE — 81001 URINALYSIS AUTO W/SCOPE: CPT

## 2019-09-01 PROCEDURE — 85384 FIBRINOGEN ACTIVITY: CPT

## 2019-09-01 PROCEDURE — 82570 ASSAY OF URINE CREATININE: CPT

## 2019-09-01 PROCEDURE — 85014 HEMATOCRIT: CPT

## 2019-09-01 PROCEDURE — 85018 HEMOGLOBIN: CPT

## 2019-09-01 PROCEDURE — 84550 ASSAY OF BLOOD/URIC ACID: CPT

## 2019-09-01 PROCEDURE — G0463: CPT

## 2019-09-01 PROCEDURE — 85027 COMPLETE CBC AUTOMATED: CPT

## 2019-09-01 PROCEDURE — 80053 COMPREHEN METABOLIC PANEL: CPT

## 2019-09-01 PROCEDURE — 59025 FETAL NON-STRESS TEST: CPT

## 2019-09-01 RX ORDER — POLYETHYLENE GLYCOL 3350 17 G/17G
17 POWDER, FOR SOLUTION ORAL ONCE
Refills: 0 | Status: COMPLETED | OUTPATIENT
Start: 2019-09-01 | End: 2019-09-01

## 2019-09-01 RX ORDER — IBUPROFEN 200 MG
1 TABLET ORAL
Qty: 0 | Refills: 0 | DISCHARGE
Start: 2019-09-01

## 2019-09-01 RX ORDER — AER TRAVELER 0.5 G/1
1 SOLUTION RECTAL; TOPICAL
Qty: 0 | Refills: 0 | DISCHARGE
Start: 2019-09-01

## 2019-09-01 RX ORDER — LANOLIN
1 OINTMENT (GRAM) TOPICAL
Qty: 0 | Refills: 0 | DISCHARGE
Start: 2019-09-01

## 2019-09-01 RX ORDER — ACETAMINOPHEN 500 MG
3 TABLET ORAL
Qty: 0 | Refills: 0 | DISCHARGE
Start: 2019-09-01

## 2019-09-01 RX ADMIN — Medication 975 MILLIGRAM(S): at 00:01

## 2019-09-01 RX ADMIN — Medication 600 MILLIGRAM(S): at 09:28

## 2019-09-01 RX ADMIN — Medication 600 MILLIGRAM(S): at 08:58

## 2019-09-01 RX ADMIN — Medication 100 MILLIGRAM(S): at 09:00

## 2019-09-01 RX ADMIN — POLYETHYLENE GLYCOL 3350 17 GRAM(S): 17 POWDER, FOR SOLUTION ORAL at 05:44

## 2019-09-01 RX ADMIN — Medication 600 MILLIGRAM(S): at 02:52

## 2019-09-01 RX ADMIN — Medication 975 MILLIGRAM(S): at 05:44

## 2019-09-01 RX ADMIN — Medication 600 MILLIGRAM(S): at 03:22

## 2019-09-01 RX ADMIN — Medication 975 MILLIGRAM(S): at 00:31

## 2019-09-01 RX ADMIN — Medication 975 MILLIGRAM(S): at 06:14

## 2019-09-01 NOTE — DISCHARGE NOTE OB - CARE PROVIDER_API CALL
Lynn Garcia)  Obstetrics and Gynecology  [address] [place] [address], [place] [address]  Phone: [phone]  Fax: [phone]  Follow Up Time:

## 2019-09-01 NOTE — DISCHARGE NOTE OB - MATERIALS PROVIDED
Back To Sleep Handout/Letter of Medical Neccessity/Vaccinations/[facility] Hearing Screen Program/Shaken Baby Prevention Handout/[facility] [place] Screening Program/[place] Feeding Log/Breastfeeding Guide and Packet/Birth Certificate Instructions/[place]  Immunization Record/Breastfeeding Log/Breastfeeding Mother’s Support Group Information/Guide to Postpartum Care/Discharge Medication Information for Patients and Families Pocket Guide

## 2019-09-01 NOTE — DISCHARGE NOTE OB - MEDICATION SUMMARY - MEDICATIONS TO TAKE
I will START or STAY ON the medications listed below when I get home from the hospital:    acetaminophen 325 mg oral tablet  -- 3 tab(s) by mouth   -- Indication: For  (normal spontaneous vaginal delivery)    ibuprofen 600 mg oral tablet  -- 1 tab(s) by mouth every 6 hours  -- Indication: For  (normal spontaneous vaginal delivery)    lanolin topical ointment  -- 1 application on skin every 6 hours, As needed, nipple soreness  -- Indication: For Sore nipples    witch hazel 50% topical pad  -- 1 application on skin every 4 hours, As needed, Perineal discomfort  -- Indication: For  (normal spontaneous vaginal delivery)    PreNata  -- Indication: For  (normal spontaneous vaginal delivery)    ferrous sulfate  -- Indication: For  (normal spontaneous vaginal delivery)    Colace  -- Indication: For  (normal spontaneous vaginal delivery)    Citracal + D  -- Indication: For  (normal spontaneous vaginal delivery)

## 2019-09-01 NOTE — DISCHARGE NOTE OB - PATIENT PORTAL LINK FT
You can access the FollowMyHealth Patient Portal offered by [facility] by registering at the following website: [email or web address]. By joining [facility]’s FollowMyHealth portal, you will also be able to view your health information using other applications (apps) compatible with our system.

## 2019-09-01 NOTE — DISCHARGE NOTE OB - CARE PLAN
Principal Discharge DX:	 (normal spontaneous vaginal delivery)  Goal:	back to baseline function  Assessment and plan of treatment:	activity as tolerated, regular diet, follow up as outpatient

## 2019-09-01 NOTE — DISCHARGE NOTE OB - MEDICATION SUMMARY - MEDICATIONS TO STOP TAKING
I will STOP taking the medications listed below when I get home from the hospital:    oxyCODONE 5 mg oral tablet  -- 1 tab(s) by mouth every 4 hours, As needed, Severe Pain (7 - 10) MDD:8    ibuprofen 600 mg oral tablet  -- 1 tab(s) by mouth every 6 hours   -- Do not take this drug if you are pregnant.  It is very important that you take or use this exactly as directed.  Do not skip doses or discontinue unless directed by your doctor.  May cause drowsiness or dizziness.  Obtain medical advice before taking any non-prescription drugs as some may affect the action of this medication.  Take with food or milk.

## 2019-09-03 ENCOUNTER — APPOINTMENT (OUTPATIENT)
Dept: ANTEPARTUM | Facility: CLINIC | Age: 29
End: 2019-09-03

## 2019-10-08 ENCOUNTER — OUTPATIENT (OUTPATIENT)
Dept: OUTPATIENT SERVICES | Facility: HOSPITAL | Age: 29
LOS: 1 days | Discharge: STILL ADMITTED OR ADMINISTRATIVE | End: 2019-10-08

## 2019-10-29 ENCOUNTER — OUTPATIENT (OUTPATIENT)
Dept: OUTPATIENT SERVICES | Facility: HOSPITAL | Age: 29
LOS: 1 days | Discharge: HOME | End: 2019-10-29

## 2019-11-20 DIAGNOSIS — F39 UNSPECIFIED MOOD [AFFECTIVE] DISORDER: ICD-10-CM

## 2019-12-30 NOTE — DISCHARGE NOTE OB - NURSING SECTION COMPLETE
Patient/Caregiver provided printed discharge information.
PAST SURGICAL HISTORY:  Anal fistula '01   s/p Fistulotomy : 3 times    H/O left inguinal hernia repair ' 13    H/O melanoma excision scalp Oct 2019    S/P cataract extraction '14  Bilateral

## 2020-04-26 ENCOUNTER — MESSAGE (OUTPATIENT)
Age: 30
End: 2020-04-26

## 2020-04-27 ENCOUNTER — APPOINTMENT (OUTPATIENT)
Dept: MRI IMAGING | Facility: IMAGING CENTER | Age: 30
End: 2020-04-27
Payer: OTHER MISCELLANEOUS

## 2020-04-27 ENCOUNTER — OUTPATIENT (OUTPATIENT)
Dept: OUTPATIENT SERVICES | Facility: HOSPITAL | Age: 30
LOS: 1 days | End: 2020-04-27
Payer: COMMERCIAL

## 2020-04-27 DIAGNOSIS — Z00.8 ENCOUNTER FOR OTHER GENERAL EXAMINATION: ICD-10-CM

## 2020-04-27 PROCEDURE — 72158 MRI LUMBAR SPINE W/O & W/DYE: CPT | Mod: 26

## 2020-04-27 PROCEDURE — 72158 MRI LUMBAR SPINE W/O & W/DYE: CPT

## 2020-04-27 PROCEDURE — A9585: CPT

## 2020-05-01 ENCOUNTER — APPOINTMENT (OUTPATIENT)
Dept: SPINE | Facility: CLINIC | Age: 30
End: 2020-05-01
Payer: OTHER MISCELLANEOUS

## 2020-05-01 VITALS
WEIGHT: 125 LBS | RESPIRATION RATE: 14 BRPM | OXYGEN SATURATION: 97 % | HEIGHT: 64 IN | TEMPERATURE: 97.7 F | HEART RATE: 116 BPM | SYSTOLIC BLOOD PRESSURE: 145 MMHG | BODY MASS INDEX: 21.34 KG/M2 | DIASTOLIC BLOOD PRESSURE: 104 MMHG

## 2020-05-01 PROCEDURE — 99203 OFFICE O/P NEW LOW 30 MIN: CPT

## 2020-05-01 RX ORDER — ACETAMINOPHEN 325 MG/1
325 TABLET, FILM COATED ORAL
Refills: 0 | Status: ACTIVE | COMMUNITY

## 2020-05-01 NOTE — REASON FOR VISIT
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  She feels some weakness with lifting her right foot.

## 2020-05-01 NOTE — PHYSICAL EXAM
[Person] : oriented to person [Place] : oriented to place [Short Term Intact] : short term memory intact [Time] : oriented to time [Remote Intact] : remote memory intact [Span Intact] : the attention span was normal [Concentration Intact] : normal concentrating ability [Fluency] : fluency intact [Current Events] : adequate knowledge of current events [Comprehension] : comprehension intact [Past History] : adequate knowledge of personal past history [Cranial Nerves Optic (II)] : visual acuity intact bilaterally,  pupils equal round and reactive to light [Vocabulary] : adequate range of vocabulary [Cranial Nerves Oculomotor (III)] : extraocular motion intact [Cranial Nerves Facial (VII)] : face symmetrical [Cranial Nerves Trigeminal (V)] : facial sensation intact symmetrically [Cranial Nerves Vestibulocochlear (VIII)] : hearing was intact bilaterally [Cranial Nerves Glossopharyngeal (IX)] : tongue and palate midline [Cranial Nerves Accessory (XI - Cranial And Spinal)] : head turning and shoulder shrug symmetric [Cranial Nerves Hypoglossal (XII)] : there was no tongue deviation with protrusion [Motor Tone] : muscle tone was normal in all four extremities [No Muscle Atrophy] : normal bulk in all four extremities [Abnormal Walk] : normal gait [Balance] : balance was intact [Past-pointing] : there was no past-pointing [Tremor] : no tremor present [Plantar Reflex Right Only] : normal on the right [2+] : Ankle jerk left 2+ [Plantar Reflex Left Only] : normal on the left [FreeTextEntry6] : right EHL and dorsiflexion 4/5 [FreeTextEntry7] : decreased light touch right L5 [No Tenderness to Palpation] : no spine tenderness on palpation [Straight-Leg Raise Test - Left] : straight leg raise of the left leg was negative [Straight-Leg Raise Test - Right] : straight leg raise of the right leg was positive [Able to toe walk] : the patient was able to toe walk [Able to heel walk] : the patient was not able to heel walk

## 2020-05-01 NOTE — HISTORY OF PRESENT ILLNESS
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 Her pain is somewhat resolved but now she has pain really go right leg the ankle with numbness and tingling. She also has trouble raising her right foot and toes. No left-sided symptoms and there is no bowel or bladder dysfunction. She describes the pain as 5/10. She is taking Advil on a Medrol dose pack. Her pain is somewhat relieved with rest and made worse with most activities. An MRI of the lumbar spine shows a large right L4-5 disc herniation with inferior extrusion. She has not as yet had any physical therapy.

## 2020-05-01 NOTE — CONSULT LETTER
[Dear  ___] : Dear  [unfilled], [Consult Letter:] : I had the pleasure of evaluating your patient, [unfilled]. [Please see my note below.] : Please see my note below. [Consult Closing:] : Thank you very much for allowing me to participate in the care of this patient.  If you have any questions, please do not hesitate to contact me. [Sincerely,] : Sincerely, [DrUte  ___] : Dr. HALE [FreeTextEntry2] : Dr. Juan David Moss M.D.\par [address] [place] [address]  [address] [FreeTextEntry3] : Dr. Donaldo Alarcon M.D.

## 2020-05-01 NOTE — ASSESSMENT
[FreeTextEntry1] : Right L5 radiculopathy with partial footdrop secondary to large right L4-5 disc herniation with inferior extrusion. I discussed further options with her including physical therapy, epidural steroid, pain management and surgical intervention. She is aware that continued neural impingement may result in permanent neurological impairment. She wishes to complete her Medrol Dosepak to try a course of physical therapy. Authorization is requested for physical therapy. It has been made clear to her that if she develops any further weakness she will need surgery as soon as possible. I will request authorization for L4-5 laminectomy and discectomy as this may certainly may potentially be needed in the near future..\par The patient was found to be hypertensive.  She was without headaches  The patient stated that she stopped taking her antihypertensive medication on her own.  It was advised that she call her primary regarding this and it was discussed that she most likely needs to stay on the medication.  The patient stated understanding.

## 2020-05-04 ENCOUNTER — APPOINTMENT (OUTPATIENT)
Dept: ORTHOPEDIC SURGERY | Facility: CLINIC | Age: 30
End: 2020-05-04

## 2020-05-28 ENCOUNTER — APPOINTMENT (OUTPATIENT)
Dept: SPINE | Facility: CLINIC | Age: 30
End: 2020-05-28
Payer: OTHER MISCELLANEOUS

## 2020-05-28 VITALS
HEART RATE: 86 BPM | SYSTOLIC BLOOD PRESSURE: 131 MMHG | HEIGHT: 64 IN | TEMPERATURE: 98 F | RESPIRATION RATE: 18 BRPM | OXYGEN SATURATION: 97 % | BODY MASS INDEX: 21.34 KG/M2 | WEIGHT: 125 LBS | DIASTOLIC BLOOD PRESSURE: 83 MMHG

## 2020-05-28 PROCEDURE — 99212 OFFICE O/P EST SF 10 MIN: CPT

## 2020-05-28 NOTE — REVIEW OF SYSTEMS
[Numbness] : numbness [Tingling] : tingling [Negative] : Heme/Lymph [de-identified] : lower back pain and right leg pain with foot drop

## 2020-05-28 NOTE — REASON FOR VISIT
[Follow-Up: _____] : a [unfilled] follow-up visit [Other: _____] : [unfilled] [FreeTextEntry1] : 30 year old female with right sided lumbar radiculopathy and foot drop who was diagnosed with a L4 L5 disc herniation with inferior extrusion.  She was recommended to undergo a laminectomy/discectomy and opted for non surgical options. She has undergone one month of PT and had some improvement and increased strength.  Her improvements are lucina and surgery is highly recommended to avoid progression of her neurological function and deficit.  All surgical risks were discussed and benefits/alternatives were reviewed.

## 2020-05-28 NOTE — ASSESSMENT
[FreeTextEntry1] : L4 L5 herniated disc with inferior extrusion and lumbar radiculopathy and right foot drop.  Slight improvement from  one month of PT.   Neurosurgery was highly suggested to prevent further neurological damage . All risks , benefits and alternatives were explained for a L 4  L5 METRx discectomy.   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  She will discuss this matter with her family and call back when she is ready to schedule surgical intervention.

## 2020-06-01 ENCOUNTER — APPOINTMENT (OUTPATIENT)
Dept: SPINE | Facility: CLINIC | Age: 30
End: 2020-06-01

## 2020-06-16 ENCOUNTER — RESULT REVIEW (OUTPATIENT)
Age: 30
End: 2020-06-16

## 2020-06-19 ENCOUNTER — OUTPATIENT (OUTPATIENT)
Dept: OUTPATIENT SERVICES | Facility: HOSPITAL | Age: 30
LOS: 1 days | End: 2020-06-19
Payer: COMMERCIAL

## 2020-06-19 VITALS
OXYGEN SATURATION: 98 % | HEART RATE: 81 BPM | DIASTOLIC BLOOD PRESSURE: 82 MMHG | RESPIRATION RATE: 18 BRPM | HEIGHT: 64 IN | TEMPERATURE: 99 F | SYSTOLIC BLOOD PRESSURE: 125 MMHG | WEIGHT: 125 LBS

## 2020-06-19 DIAGNOSIS — Z98.890 OTHER SPECIFIED POSTPROCEDURAL STATES: Chronic | ICD-10-CM

## 2020-06-19 DIAGNOSIS — M51.26 OTHER INTERVERTEBRAL DISC DISPLACEMENT, LUMBAR REGION: ICD-10-CM

## 2020-06-19 DIAGNOSIS — Z01.818 ENCOUNTER FOR OTHER PREPROCEDURAL EXAMINATION: ICD-10-CM

## 2020-06-19 RX ORDER — CHLORHEXIDINE GLUCONATE 213 G/1000ML
1 SOLUTION TOPICAL ONCE
Refills: 0 | Status: DISCONTINUED | OUTPATIENT
Start: 2020-06-30 | End: 2020-07-15

## 2020-06-19 RX ORDER — FERROUS SULFATE 325(65) MG
0 TABLET ORAL
Qty: 0 | Refills: 0 | DISCHARGE

## 2020-06-19 RX ORDER — CEFAZOLIN SODIUM 1 G
2000 VIAL (EA) INJECTION ONCE
Refills: 0 | Status: DISCONTINUED | OUTPATIENT
Start: 2020-06-30 | End: 2020-07-15

## 2020-06-19 NOTE — H&P PST ADULT - HISTORY OF PRESENT ILLNESS
30yr old female with large right L4-5 disc herniation with inferior extrusion.  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 pt now coming for  L4-5 lumbar metrx discectomy 30yr old female with large right L4-5 disc herniation with inferior extrusion.  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 pt now coming for  L4-5 lumbar metrx discectomy.    note: covid test 6/27/20 [address]

## 2020-06-19 NOTE — H&P PST ADULT - DENTITION
Advice and education were given regarding nutrition, aerobic exercises, weight bearing exercises, cardiovascular risk reduction, fall risk reduction, and age appropriate supplements  The patient was counseled regarding instructions for management, risk factor reductions, prognosis, risks and benefits of treatment options, patient and family education, and importance of compliance with treatment  normal

## 2020-06-19 NOTE — H&P PST ADULT - NSICDXPASTSURGICALHX_GEN_ALL_CORE_FT
PAST SURGICAL HISTORY:  History of strabismus surgery childhood    S/P embolization of ovarian artery 2017

## 2020-06-19 NOTE — H&P PST ADULT - NSICDXPROBLEM_GEN_ALL_CORE_FT
PROBLEM DIAGNOSES  Problem: Other intervertebral disc displacement, lumbar region  Assessment and Plan: coming in for L4-5 lumbar metrx disectomy

## 2020-06-23 DIAGNOSIS — Z01.818 ENCOUNTER FOR OTHER PREPROCEDURAL EXAMINATION: ICD-10-CM

## 2020-06-27 ENCOUNTER — APPOINTMENT (OUTPATIENT)
Dept: DISASTER EMERGENCY | Facility: CLINIC | Age: 30
End: 2020-06-27

## 2020-06-27 LAB — SARS-COV-2 N GENE NPH QL NAA+PROBE: NOT DETECTED

## 2020-06-29 ENCOUNTER — TRANSCRIPTION ENCOUNTER (OUTPATIENT)
Age: 30
End: 2020-06-29

## 2020-06-30 ENCOUNTER — APPOINTMENT (OUTPATIENT)
Dept: SPINE | Facility: HOSPITAL | Age: 30
End: 2020-06-30

## 2020-06-30 ENCOUNTER — OUTPATIENT (OUTPATIENT)
Dept: OUTPATIENT SERVICES | Facility: HOSPITAL | Age: 30
LOS: 1 days | Discharge: HOME | End: 2020-06-30
Payer: COMMERCIAL

## 2020-06-30 VITALS
HEART RATE: 77 BPM | SYSTOLIC BLOOD PRESSURE: 132 MMHG | OXYGEN SATURATION: 98 % | DIASTOLIC BLOOD PRESSURE: 89 MMHG | TEMPERATURE: 99 F | RESPIRATION RATE: 18 BRPM

## 2020-06-30 VITALS — HEART RATE: 68 BPM | OXYGEN SATURATION: 100 % | RESPIRATION RATE: 16 BRPM

## 2020-06-30 DIAGNOSIS — Z98.890 OTHER SPECIFIED POSTPROCEDURAL STATES: Chronic | ICD-10-CM

## 2020-06-30 DIAGNOSIS — M51.26 OTHER INTERVERTEBRAL DISC DISPLACEMENT, LUMBAR REGION: ICD-10-CM

## 2020-06-30 PROCEDURE — 81025 URINE PREGNANCY TEST: CPT

## 2020-06-30 PROCEDURE — 63030 LAMOT DCMPRN NRV RT 1 LMBR: CPT

## 2020-06-30 PROCEDURE — 76000 FLUOROSCOPY <1 HR PHYS/QHP: CPT

## 2020-06-30 PROCEDURE — 80048 BASIC METABOLIC PNL TOTAL CA: CPT

## 2020-06-30 PROCEDURE — C1889: CPT

## 2020-06-30 PROCEDURE — C1769: CPT

## 2020-06-30 PROCEDURE — G0463: CPT

## 2020-06-30 PROCEDURE — 63030 LAMOT DCMPRN NRV RT 1 LMBR: CPT | Mod: RT

## 2020-06-30 PROCEDURE — 85027 COMPLETE CBC AUTOMATED: CPT

## 2020-06-30 RX ORDER — CEPHALEXIN 500 MG
500 CAPSULE ORAL EVERY 12 HOURS
Refills: 0 | Status: DISCONTINUED | OUTPATIENT
Start: 2020-06-30 | End: 2020-06-30

## 2020-06-30 RX ORDER — FENTANYL CITRATE 50 UG/ML
25 INJECTION INTRAVENOUS
Refills: 0 | Status: DISCONTINUED | OUTPATIENT
Start: 2020-06-30 | End: 2020-06-30

## 2020-06-30 RX ORDER — ONDANSETRON 8 MG/1
4 TABLET, FILM COATED ORAL ONCE
Refills: 0 | Status: DISCONTINUED | OUTPATIENT
Start: 2020-06-30 | End: 2020-06-30

## 2020-06-30 RX ORDER — CEPHALEXIN 500 MG
1 CAPSULE ORAL
Qty: 6 | Refills: 0
Start: 2020-06-30 | End: 2020-07-02

## 2020-06-30 RX ORDER — SODIUM CHLORIDE 9 MG/ML
3 INJECTION INTRAMUSCULAR; INTRAVENOUS; SUBCUTANEOUS EVERY 8 HOURS
Refills: 0 | Status: DISCONTINUED | OUTPATIENT
Start: 2020-06-30 | End: 2020-06-30

## 2020-06-30 RX ORDER — LIDOCAINE HCL 20 MG/ML
0.2 VIAL (ML) INJECTION ONCE
Refills: 0 | Status: DISCONTINUED | OUTPATIENT
Start: 2020-06-30 | End: 2020-06-30

## 2020-06-30 RX ORDER — FENTANYL CITRATE 50 UG/ML
50 INJECTION INTRAVENOUS
Refills: 0 | Status: DISCONTINUED | OUTPATIENT
Start: 2020-06-30 | End: 2020-06-30

## 2020-06-30 RX ORDER — OXYCODONE AND ACETAMINOPHEN 5; 325 MG/1; MG/1
1 TABLET ORAL EVERY 4 HOURS
Refills: 0 | Status: DISCONTINUED | OUTPATIENT
Start: 2020-06-30 | End: 2020-06-30

## 2020-06-30 NOTE — ASU DISCHARGE PLAN (ADULT/PEDIATRIC) - ASU DC SPECIAL INSTRUCTIONSFT
PLEASE REMOVE DRESSING ON WEDNESDAY MORNING, LEAVE STERI STRIPS INTACT    OK TO SHOWER ON THURSDAY, PLEASE DO NOT SCRUB INCISION    PLEASE FOLLOW UP WITH DR CORNEJO IN 1-2 WEEKS    TAKE MEDICATIONS AS DIRECTED

## 2020-06-30 NOTE — PRE-ANESTHESIA EVALUATION ADULT - NSANTHPMHFT_GEN_ALL_CORE
29 y/o F with PMHx significant for Lumbar herniated disc, strabismus ely, s/p embolization of ovarian artery (2017) presents with back pain and displacement of L5 nerve root as reported on MRI 6/2020 - scheduled for metrex discectomy.  COVID negative 6/[address]

## 2020-06-30 NOTE — BRIEF OPERATIVE NOTE - NSICDXBRIEFPROCEDURE_GEN_ALL_CORE_FT
PROCEDURES:  Laminectomy, lumbar, with discectomy, posterior approach 30-Jun-2020 10:59:54  Shaheen Carreon

## 2020-06-30 NOTE — ASU DISCHARGE PLAN (ADULT/PEDIATRIC) - CALL YOUR DOCTOR IF YOU HAVE ANY OF THE FOLLOWING:
Bleeding that does not stop/Fever greater than (need to indicate Fahrenheit or Celsius)/Swelling that gets worse/Pain not relieved by Medications/Wound/Surgical Site with redness, or foul smelling discharge or pus/Unable to urinate/Inability to tolerate liquids or foods/Nausea and vomiting that does not stop/Numbness, tingling, color or temperature change to extremity/Increased irritability or sluggishness/Excessive diarrhea

## 2020-06-30 NOTE — ASU DISCHARGE PLAN (ADULT/PEDIATRIC) - CARE PROVIDER_API CALL
Donaldo Alarcon  NEUROSURGERY  [address] [place] [id number]  Phone: [phone]  Fax: [phone]  Follow Up Time:

## 2020-06-30 NOTE — ASU PREOP CHECKLIST - ANTIBIOTIC
-- Message is from the [facility]--    Reason for Call: PATIENT BROTHER IS IN THE HOSPITAL AND SHE NEEDS TO CANCEL AND RESCHEDULE THE APPOINTMENT SHE DOES NOT WANT TO BE CHARGE THE FEE FOR CANCELLATION TOMORROW APPOINTMENT... CAN NURSE SAMANTHA PLEASE  CALL TO DISCUSS THE PROBLEM    Caller Information       Type Contact Phone    05/05/2019 08:56 PM Phone (Incoming) Taylor Cartwright (Self) [phone] (H)          Alternative phone number: NONE    Turnaround time given to caller:   \"This message will be sent to [state Provider's name]. The clinical team will fulfill your request as soon as they review your message when the office opens tomorrow.\"     yes

## 2020-07-10 ENCOUNTER — APPOINTMENT (OUTPATIENT)
Dept: SPINE | Facility: CLINIC | Age: 30
End: 2020-07-10

## 2020-07-10 VITALS
WEIGHT: 125 LBS | HEART RATE: 79 BPM | HEIGHT: 64 IN | TEMPERATURE: 36.8 F | SYSTOLIC BLOOD PRESSURE: 132 MMHG | DIASTOLIC BLOOD PRESSURE: 86 MMHG | OXYGEN SATURATION: 98 % | BODY MASS INDEX: 21.34 KG/M2 | RESPIRATION RATE: 12 BRPM

## 2020-07-10 NOTE — PHYSICAL EXAM
[General Appearance - Alert] : alert [General Appearance - In No Acute Distress] : in no acute distress [General Appearance - Well Nourished] : well nourished [General Appearance - Well Developed] : well developed [General Appearance - Well-Appearing] : healthy appearing [Longitudinal] : longitudinal [Clean] : clean [Healing Well] : healing well [Dry] : dry [Normal Skin] : normal [No Drainage] : without drainage [Oriented To Time, Place, And Person] : oriented to person, place, and time [Impaired Insight] : insight and judgment were intact [Affect] : the affect was normal [Mood] : the mood was normal [Person] : oriented to person [Place] : oriented to place [Time] : oriented to time [Short Term Intact] : short term memory intact [Remote Intact] : remote memory intact [Registration Intact] : recent registration memory intact [Concentration Intact] : normal concentrating ability [Span Intact] : the attention span was normal [Visual Intact] : visual attention was ~T not ~L decreased [Fluency] : fluency intact [Comprehension] : comprehension intact [Current Events] : adequate knowledge of current events [Reading] : reading intact [Past History] : adequate knowledge of personal past history [Vocabulary] : adequate range of vocabulary [No Visual Abnormalities] : no visible abnormailities [Outer Ear] : the ears and nose were normal in appearance [] : no respiratory distress [Abnormal Walk] : normal gait [Neck Appearance] : the appearance of the neck was normal [Skin Color & Pigmentation] : normal skin color and pigmentation [Tender] : not tender [Warm] : not warm [Erythema] : not erythematous [FreeTextEntry1] : lumbar incision [Sensation Tactile Decrease] : light touch was intact [Involuntary Movements] : no involuntary movements were seen [FreeTextEntry6] : right foot drop,  slightly improved  [Able to toe walk] : the patient was not able to toe walk [Able to heel walk] : the patient was not able to heel walk [Intact] : all motor groups within normal limits of strength and tone bilaterally

## 2020-07-10 NOTE — ASSESSMENT
[FreeTextEntry1] : Ten day post-op L4 L5  laminectomy and discectomy.  Recovering well and slightly improved right foot drop.  Walking improved.  Incision site clean and dry.  She will contact the office for any s/s of infection at the incision site.  No B L T was enforced.  May continue to perform leg exercises with no weight lifting.  Return in 3 to 4 weeks.

## 2020-07-10 NOTE — HISTORY OF PRESENT ILLNESS
[FreeTextEntry1] : 30 year old who is now ten days post op from a disc herniation and inferior disc extrusion of L 4 L5 and s/p lumbar laminectomy and discectomy.   She has a known right foot drop which is reported as improved since surgery.  her walking has improved and she is on no pain medications. The lower back incision is clean, flat and dry with steri strips.

## 2020-07-10 NOTE — REASON FOR VISIT
[Other: _____] : [unfilled] [de-identified] : 06/30/2020 [de-identified] : L4 L5 laminectomy and discectomy with intraoperative surgical microscope

## 2020-07-30 ENCOUNTER — TRANSCRIPTION ENCOUNTER (OUTPATIENT)
Age: 30
End: 2020-07-30

## 2020-08-03 ENCOUNTER — APPOINTMENT (OUTPATIENT)
Dept: SPINE | Facility: CLINIC | Age: 30
End: 2020-08-03
Payer: OTHER MISCELLANEOUS

## 2020-08-03 PROCEDURE — 99024 POSTOP FOLLOW-UP VISIT: CPT

## 2020-08-05 VITALS
HEIGHT: 64 IN | TEMPERATURE: 98.5 F | SYSTOLIC BLOOD PRESSURE: 109 MMHG | DIASTOLIC BLOOD PRESSURE: 62 MMHG | RESPIRATION RATE: 12 BRPM | BODY MASS INDEX: 21.34 KG/M2 | WEIGHT: 125 LBS | OXYGEN SATURATION: 97 % | HEART RATE: 62 BPM

## 2020-08-05 NOTE — REASON FOR VISIT
[Other: _____] : [unfilled] [de-identified] : 6/30/2020 [de-identified] : L4  L 5 laminectomy and discectomy

## 2020-08-05 NOTE — PHYSICAL EXAM
[Well-Healed] : well-healed [Abnormal Walk] : normal gait [FreeTextEntry6] : EHL and dorsiflexion 4/5 right side

## 2020-08-05 NOTE — HISTORY OF PRESENT ILLNESS
[FreeTextEntry1] : Five weeks postop L4 L5 lami and discectomy after a large herniated disc. She had a right foot drop preop and there is some improvement.  She is attending PT twice a week.  Her incision is well healed.

## 2020-08-05 NOTE — ASSESSMENT
[FreeTextEntry1] : Five weeks postop L4 L5 lami/discectomy.  Doing well.   No lifting for two more weeks.  Body mechanics were discussed for RTW.   In six weeks she will return.  Increase PT to three times a week.

## 2020-08-13 ENCOUNTER — APPOINTMENT (OUTPATIENT)
Dept: SPINE | Facility: CLINIC | Age: 30
End: 2020-08-13
Payer: OTHER MISCELLANEOUS

## 2020-08-13 VITALS
SYSTOLIC BLOOD PRESSURE: 155 MMHG | HEART RATE: 87 BPM | BODY MASS INDEX: 21.34 KG/M2 | RESPIRATION RATE: 14 BRPM | HEIGHT: 64 IN | DIASTOLIC BLOOD PRESSURE: 84 MMHG | TEMPERATURE: 98.1 F | OXYGEN SATURATION: 95 % | WEIGHT: 125 LBS

## 2020-08-13 PROCEDURE — 99024 POSTOP FOLLOW-UP VISIT: CPT

## 2020-08-13 NOTE — ASSESSMENT
[FreeTextEntry1] : Activity levels and proper body mechanics were discussed. The patient was provided with a form stating that she can return to work with restrictions no lifting more than 20 pounds.  She is to return to work in next week  on the 17th. she is to avoid lifting anything more than 20 pounds.

## 2020-08-13 NOTE — REASON FOR VISIT
[de-identified] : L4-L5 laminectomy and discectomy [de-identified] : 6/30/2020 [de-identified] : The patient stated that she is feeling better and is without pain but has some back achiness.  She is doing physical thearpy and states that she has some weakness with dorsaflexion.  The patient needed a form for clearance to return to work.

## 2020-08-13 NOTE — PHYSICAL EXAM
[FreeTextEntry1] : The patient is alert and oriented to person, place and time.  Higher cortical functions are intact. Face symmetrical.  She has some weakness with standing and going up on her right heel. The patient appears to move all extremities well.  Gait is normal.  Her lumbar incision is well healed.\par  [Healing Well] : healing well [FreeTextEntry6] : right EHL 4/5

## 2020-09-17 ENCOUNTER — APPOINTMENT (OUTPATIENT)
Dept: SPINE | Facility: CLINIC | Age: 30
End: 2020-09-17
Payer: OTHER MISCELLANEOUS

## 2020-09-17 VITALS
DIASTOLIC BLOOD PRESSURE: 85 MMHG | HEIGHT: 64 IN | SYSTOLIC BLOOD PRESSURE: 132 MMHG | WEIGHT: 125 LBS | HEART RATE: 86 BPM | BODY MASS INDEX: 21.34 KG/M2 | OXYGEN SATURATION: 97 %

## 2020-09-17 VITALS
HEART RATE: 86 BPM | RESPIRATION RATE: 16 BRPM | SYSTOLIC BLOOD PRESSURE: 132 MMHG | BODY MASS INDEX: 21.34 KG/M2 | OXYGEN SATURATION: 97 % | HEIGHT: 64 IN | TEMPERATURE: 98.2 F | WEIGHT: 125 LBS | DIASTOLIC BLOOD PRESSURE: 85 MMHG

## 2020-09-17 DIAGNOSIS — Z86.79 PERSONAL HISTORY OF OTHER DISEASES OF THE CIRCULATORY SYSTEM: ICD-10-CM

## 2020-09-17 DIAGNOSIS — M54.16 RADICULOPATHY, LUMBAR REGION: ICD-10-CM

## 2020-09-17 PROCEDURE — 99024 POSTOP FOLLOW-UP VISIT: CPT

## 2020-09-17 RX ORDER — METHYLPREDNISOLONE 4 MG/1
4 TABLET ORAL
Refills: 0 | Status: DISCONTINUED | COMMUNITY
End: 2020-09-17

## 2020-09-17 RX ORDER — IBUPROFEN 200 MG/1
200 TABLET, COATED ORAL
Refills: 0 | Status: DISCONTINUED | COMMUNITY
End: 2020-09-17

## 2020-09-17 RX ORDER — CALCIUM CITRATE, IRON PENTACARBONYL, CHOLECALCIFEROL, .ALPHA.-TOCOPHEROL, DL-, PYRIDOXINE HYDROCHLORIDE, FOLIC ACID, DOCUSATE SODIUM, AND DOCONEXENT 104; 27; 400; 30; 25; 1; 50; 260 MG/1; MG/1; [IU]/1; [IU]/1; MG/1; MG/1; MG/1; MG/1
CAPSULE, GELATIN COATED ORAL
Refills: 0 | Status: DISCONTINUED | COMMUNITY
End: 2020-09-17

## 2020-09-17 NOTE — PHYSICAL EXAM
[General Appearance - Alert] : alert [General Appearance - In No Acute Distress] : in no acute distress [General Appearance - Well Nourished] : well nourished [General Appearance - Well Developed] : well developed [General Appearance - Well-Appearing] : healthy appearing [Longitudinal] : longitudinal [Clean] : clean [Dry] : dry [Well-Healed] : well-healed [Intact] : intact [No Drainage] : without drainage [Normal Skin] : normal [Oriented To Time, Place, And Person] : oriented to person, place, and time [Impaired Insight] : insight and judgment were intact [Affect] : the affect was normal [Mood] : the mood was normal [Memory Recent] : recent memory was not impaired [Person] : oriented to person [Place] : oriented to place [Time] : oriented to time [Short Term Intact] : short term memory intact [Remote Intact] : remote memory intact [Registration Intact] : recent registration memory intact [Span Intact] : the attention span was normal [Concentration Intact] : normal concentrating ability [Visual Intact] : visual attention was ~T not ~L decreased [Fluency] : fluency intact [Comprehension] : comprehension intact [Reading] : reading intact [Current Events] : adequate knowledge of current events [Past History] : adequate knowledge of personal past history [Vocabulary] : adequate range of vocabulary [Motor Tone] : muscle tone was normal in all four extremities [Involuntary Movements] : no involuntary movements were seen [Sensation Tactile Decrease] : light touch was intact [Balance] : balance was intact [No Visual Abnormalities] : no visible abnormailities [No Masses] : no masses [Full ROM] : full ROM [No Pain with ROM] : no pain with motion in any direction [Sclera] : the sclera and conjunctiva were normal [Outer Ear] : the ears and nose were normal in appearance [Neck Appearance] : the appearance of the neck was normal [] : no respiratory distress [Abnormal Walk] : normal gait [Skin Color & Pigmentation] : normal skin color and pigmentation [FreeTextEntry1] : lower back  [Limited Balance] : balance was intact [FreeTextEntry6] : right dorsiflexion weakness  [Straight-Leg Raise Test - Left] : straight leg raise of the left leg was negative [Straight-Leg Raise Test - Right] : straight leg raise  of the right leg was negative

## 2020-09-17 NOTE — HISTORY OF PRESENT ILLNESS
[FreeTextEntry1] : This is a 30 year old well known patient who suffered from a large herniated disc and right foot drop who is now two and half months post  op from a L4 L5 laminectomy.  She has improvement  of her foot drop and no pain .    No trips or falls are reported.  She has returned back to work but only on light duty .  \par Her incision is well healed and intact.

## 2020-09-17 NOTE — ASSESSMENT
[FreeTextEntry1] : 2.5 months post L4 L5 laminectomy and recovered well.  Right dorsiflexion weakness improving.  She is able to return back to work on the 5th of October.  She will return to the office on a PRN basis.

## 2020-10-01 ENCOUNTER — APPOINTMENT (OUTPATIENT)
Dept: PLASTIC SURGERY | Facility: CLINIC | Age: 30
End: 2020-10-01
Payer: COMMERCIAL

## 2020-10-01 VITALS — BODY MASS INDEX: 21.34 KG/M2 | HEIGHT: 64 IN | WEIGHT: 125 LBS

## 2020-10-01 PROCEDURE — 99203 OFFICE O/P NEW LOW 30 MIN: CPT

## 2020-10-01 NOTE — HISTORY OF PRESENT ILLNESS
[FreeTextEntry1] : 30 years old Patient presents to the office for a consult.\par Pt states last October 2019 she noticed a brown spot on her left forearm and states area has increased in size and it's causing itchiness and discomfort.\par Pt also has an hemangioma om her right upper arm that was partially removed 13 years ago and pt is now c/o itchiness and pain, pt tried laser treatment and Kenalog injection and denies any improvement.\par Pt also noticed a mole on her right abdomen, noticed brown discoloration.\par Pt denies any family history of skin cancer.\par Here to discuss possible treatment.

## 2020-10-15 ENCOUNTER — APPOINTMENT (OUTPATIENT)
Dept: PLASTIC SURGERY | Facility: CLINIC | Age: 30
End: 2020-10-15
Payer: COMMERCIAL

## 2020-10-15 ENCOUNTER — LABORATORY RESULT (OUTPATIENT)
Age: 30
End: 2020-10-15

## 2020-10-15 DIAGNOSIS — D22.9 MELANOCYTIC NEVI, UNSPECIFIED: ICD-10-CM

## 2020-10-15 PROCEDURE — 13100 CMPLX RPR TRUNK 1.1-2.5 CM: CPT | Mod: 58

## 2020-10-15 PROCEDURE — 11402 EXC TR-EXT B9+MARG 1.1-2 CM: CPT

## 2020-10-15 PROCEDURE — 11440 EXC FACE-MM B9+MARG 0.5 CM/<: CPT

## 2020-10-15 NOTE — REASON FOR VISIT
[Procedure: _________] : a [unfilled] procedure visit [FreeTextEntry1] : ex bx and closure of Right abd. nevus

## 2020-10-15 NOTE — PROCEDURE
[FreeTextEntry6] : Preopdx:abdominal nevus\par Procedure: excisional biopsy   1.1cm nevus of abdomen and complex closure 1.1cm\par Anesthesia: local 1% w/epi\par Specimens: to path on formalin\par No complications\par \par Summary:\par IC obtained.  Lesion demarcated with marking pen.  1%lido with epinephrine injected.  15 blade used to incise full thickness.    1.1Cm  lesion excised as an ellipse full thickness in subcutaneous plane.   Hemostasis obtained with cautery.  Skin edges widely undermined and closed for a complex closure of  1.1cm.  bacitracin and steristrips placed.\par \par shave  biopsy 4mm performed forehead using 15 blade\par

## 2020-10-22 ENCOUNTER — APPOINTMENT (OUTPATIENT)
Dept: PLASTIC SURGERY | Facility: CLINIC | Age: 30
End: 2020-10-22
Payer: COMMERCIAL

## 2020-10-22 ENCOUNTER — LABORATORY RESULT (OUTPATIENT)
Age: 30
End: 2020-10-22

## 2020-10-22 PROCEDURE — 11402 EXC TR-EXT B9+MARG 1.1-2 CM: CPT | Mod: 79

## 2020-10-22 PROCEDURE — 99072 ADDL SUPL MATRL&STAF TM PHE: CPT

## 2020-10-22 PROCEDURE — 13120 CMPLX RPR S/A/L 1.1-2.5 CM: CPT | Mod: 79

## 2020-10-22 NOTE — REASON FOR VISIT
[Procedure: _________] : a [unfilled] procedure visit [FreeTextEntry1] : ex bx and closure of Left forearm mass.

## 2020-10-22 NOTE — PROCEDURE
[FreeTextEntry6] : Preopdx:left arm mass\par Procedure: excisional biopsy   1.2cm nevus of arm and complex closure 1.2cm arm\par Anesthesia: local 1% w/epi\par Specimens: to path on formalin\par No complications\par \par Summary:\par IC obtained.  Lesion demarcated with marking pen.  1%lido with epinephrine injected.  15 blade used to incise full thickness.    1.2Cm  lesion excised as an ellipse full thickness in subcutaneous plane.   Hemostasis obtained with cautery.  Skin edges widely undermined and closed for a complex closure of  1.2cm.  bacitracin and steristrips placed.\par

## 2020-10-30 ENCOUNTER — APPOINTMENT (OUTPATIENT)
Dept: PLASTIC SURGERY | Facility: CLINIC | Age: 30
End: 2020-10-30
Payer: COMMERCIAL

## 2020-10-30 PROCEDURE — 99024 POSTOP FOLLOW-UP VISIT: CPT

## 2020-11-02 NOTE — HISTORY OF PRESENT ILLNESS
[FreeTextEntry1] : s/p excisional biopsy and closure of dermatofibroma on left forearm. margins clear.\par Here for follow up.  No excessive bleeding. No fevers. No odor. No purulent discharge. No excessive pain.\par \par

## 2020-11-12 ENCOUNTER — APPOINTMENT (OUTPATIENT)
Dept: DERMATOLOGY | Facility: CLINIC | Age: 30
End: 2020-11-12
Payer: COMMERCIAL

## 2020-11-12 PROCEDURE — 99203 OFFICE O/P NEW LOW 30 MIN: CPT

## 2020-11-12 PROCEDURE — 99072 ADDL SUPL MATRL&STAF TM PHE: CPT

## 2020-11-12 RX ORDER — MOMETASONE FUROATE 1 MG/G
0.1 CREAM TOPICAL
Qty: 1 | Refills: 1 | Status: ACTIVE | COMMUNITY
Start: 2020-11-12 | End: 1900-01-01

## 2020-11-19 ENCOUNTER — APPOINTMENT (OUTPATIENT)
Dept: DERMATOLOGY | Facility: CLINIC | Age: 30
End: 2020-11-19
Payer: COMMERCIAL

## 2020-11-19 PROCEDURE — 17110 DESTRUCTION B9 LES UP TO 14: CPT | Mod: 59

## 2020-11-19 PROCEDURE — 99213 OFFICE O/P EST LOW 20 MIN: CPT | Mod: 25

## 2020-11-19 PROCEDURE — 11900 INJECT SKIN LESIONS </W 7: CPT | Mod: 59

## 2020-11-19 NOTE — ASSESSMENT
[FreeTextEntry1] : Scar\par s/p hemangioma\par education\par trial of PDL followed by ILK to raised red area and PDL to rest of scar\par \par Risks and benefits of Pulsed Dye Laser Therapy were discussed including bruising, swelling, discoloration, lack of response, and need for multiple treatments;\par Pulsed Dye Laser :  Handpiece, 7 mm\par 8.5 Joules\par 1.5 msec;\par Site- right arm, lateral; used 6 msec on rest of lesion\par \par Risks and benefits were discussed including including atrophy, discoloration\par Intralesional triamcinolone, concentration 5 mg/cc;\par Total volume   0.2   cc\par \par Sites: right upper arm\par \par \par

## 2020-11-19 NOTE — PHYSICAL EXAM
[Alert] : alert [Oriented x 3] : ~L oriented x 3 [Well Nourished] : well nourished [Conjunctiva Non-injected] : conjunctiva non-injected [No Visual Lymphadenopathy] : no visual  lymphadenopathy [No Clubbing] : no clubbing [No Edema] : no edema [No Bromhidrosis] : no bromhidrosis [No Chromhidrosis] : no chromhidrosis [Full Body Skin Exam Performed] : performed [FreeTextEntry3] : AAOx3, pleasant, NAD, no visual lymphadenopathy\par hair, scalp, face, nose, eyelids, ears, lips, oropharynx, neck, chest, abdomen, back, right arm, left arm, nails, and hands examined with all normal findings,\par pertinent findings include:\par \par rounded scar with erythema on lateral border

## 2020-11-19 NOTE — HISTORY OF PRESENT ILLNESS
[FreeTextEntry1] : lesion right arm [de-identified] : 30 year old female here with lesion on right arm. growing and red. s/p scar from hemangioma. interested in tx.

## 2020-12-04 ENCOUNTER — APPOINTMENT (OUTPATIENT)
Dept: PLASTIC SURGERY | Facility: CLINIC | Age: 30
End: 2020-12-04

## 2020-12-31 ENCOUNTER — APPOINTMENT (OUTPATIENT)
Dept: DERMATOLOGY | Facility: CLINIC | Age: 30
End: 2020-12-31
Payer: COMMERCIAL

## 2020-12-31 PROCEDURE — 99072 ADDL SUPL MATRL&STAF TM PHE: CPT

## 2020-12-31 PROCEDURE — 17110 DESTRUCTION B9 LES UP TO 14: CPT

## 2020-12-31 PROCEDURE — 99213 OFFICE O/P EST LOW 20 MIN: CPT | Mod: 25

## 2021-01-22 ENCOUNTER — APPOINTMENT (OUTPATIENT)
Dept: SPINE | Facility: CLINIC | Age: 31
End: 2021-01-22
Payer: OTHER MISCELLANEOUS

## 2021-01-22 DIAGNOSIS — Z82.49 FAMILY HISTORY OF ISCHEMIC HEART DISEASE AND OTHER DISEASES OF THE CIRCULATORY SYSTEM: ICD-10-CM

## 2021-01-22 DIAGNOSIS — Z98.890 OTHER SPECIFIED POSTPROCEDURAL STATES: ICD-10-CM

## 2021-01-22 DIAGNOSIS — M51.26 OTHER INTERVERTEBRAL DISC DISPLACEMENT, LUMBAR REGION: ICD-10-CM

## 2021-01-22 PROCEDURE — 99212 OFFICE O/P EST SF 10 MIN: CPT | Mod: 95

## 2021-01-22 NOTE — REASON FOR VISIT
[Follow-Up: _____] : a [unfilled] follow-up visit [FreeTextEntry1] : 7 months status post microlumbar discectomy.. Prior to surgery the patient had a near complete foot drop which is improved postoperatively with physical therapy. She clearly is benefiting from physical therapy and should continue with physical therapy.

## 2021-01-22 NOTE — HISTORY OF PRESENT ILLNESS
[Home] : at home, [unfilled] , at the time of the visit. [Medical Office: ([facility])___] : at the medical office located in  [Other:____] : [unfilled] [Verbal consent obtained from patient] : the patient, [unfilled] [FreeTextEntry4] : Kim Lombardo

## 2021-01-22 NOTE — HISTORY OF PRESENT ILLNESS
[Home] : at home, [unfilled] , at the time of the visit. [Medical Office: ([facility])___] : at the medical office located in  [Other:____] : [unfilled] [Verbal consent obtained from patient] : the patient, [unfilled] [FreeTextEntry4] : Kim Lombardo

## 2021-01-22 NOTE — ASSESSMENT
[FreeTextEntry1] : It is noted to her that this patient continue with physical therapy order to maximize the return of function to right ankle and foot. Authorization is requested for further physical therapy.

## 2021-02-08 NOTE — ED PROVIDER NOTE - NS ED NOTE AC HIGH RISK COUNTRIES
----- Message from Haylie Briceño sent at 2/8/2021  8:58 AM CST -----  Regarding: covid lab  Hi,  Please place covid lab , patient scheduled 3/9. thanks    
Lab order placed  
No

## 2021-02-11 ENCOUNTER — APPOINTMENT (OUTPATIENT)
Dept: DERMATOLOGY | Facility: CLINIC | Age: 31
End: 2021-02-11
Payer: COMMERCIAL

## 2021-02-11 PROCEDURE — 99072 ADDL SUPL MATRL&STAF TM PHE: CPT

## 2021-02-11 PROCEDURE — 17110 DESTRUCTION B9 LES UP TO 14: CPT

## 2021-02-11 PROCEDURE — 99213 OFFICE O/P EST LOW 20 MIN: CPT | Mod: 25

## 2021-03-18 ENCOUNTER — APPOINTMENT (OUTPATIENT)
Dept: DERMATOLOGY | Facility: CLINIC | Age: 31
End: 2021-03-18
Payer: COMMERCIAL

## 2021-03-18 PROCEDURE — 99072 ADDL SUPL MATRL&STAF TM PHE: CPT

## 2021-03-18 PROCEDURE — 17110 DESTRUCTION B9 LES UP TO 14: CPT

## 2021-03-18 PROCEDURE — 99213 OFFICE O/P EST LOW 20 MIN: CPT | Mod: 25

## 2021-03-18 NOTE — ASSESSMENT
[FreeTextEntry1] : NUB; Scar\par s/p hemangioma\par education\par PDL today- 4th session \par \par Risks and benefits of Pulsed Dye Laser Therapy were discussed including bruising, swelling, discoloration, lack of response, and need for multiple treatments;\par Pulsed Dye Laser :  Handpiece, 7 mm\par 8.5 Joules\par 1.5 msec;\par Total pulses: 10\par \par Sites: Right Upper arm\par \par \par DF/scar\par education\par

## 2021-03-18 NOTE — PHYSICAL EXAM
[Alert] : alert [Oriented x 3] : ~L oriented x 3 [Well Nourished] : well nourished [Conjunctiva Non-injected] : conjunctiva non-injected [No Visual Lymphadenopathy] : no visual  lymphadenopathy [No Clubbing] : no clubbing [No Edema] : no edema [No Bromhidrosis] : no bromhidrosis [No Chromhidrosis] : no chromhidrosis [Full Body Skin Exam Performed] : performed [FreeTextEntry3] : AAOx3, pleasant, NAD, no visual lymphadenopathy\par hair, scalp, face, nose, eyelids, ears, lips, oropharynx, neck, chest, abdomen, back, right arm, left arm, nails, and hands examined with all normal findings,\par pertinent findings include:\par \par rounded scar with erythema on lateral border- improved from last visit, flattened \par scar from DF on left forearm

## 2021-03-18 NOTE — HISTORY OF PRESENT ILLNESS
[FreeTextEntry1] : lesion right arm [de-identified] : 30 year old female here with lesion on right arm. growing and red. s/p scar from hemangioma. Here for PDL. Was treated with PDL and ILK at last visit with good results. Scar flattened with ILK, patient is pleased with the results.

## 2021-03-26 ENCOUNTER — APPOINTMENT (OUTPATIENT)
Dept: ANTEPARTUM | Facility: CLINIC | Age: 31
End: 2021-03-26
Payer: COMMERCIAL

## 2021-03-26 ENCOUNTER — ASOB RESULT (OUTPATIENT)
Age: 31
End: 2021-03-26

## 2021-03-26 PROCEDURE — 36416 COLLJ CAPILLARY BLOOD SPEC: CPT

## 2021-03-26 PROCEDURE — 76813 OB US NUCHAL MEAS 1 GEST: CPT

## 2021-03-26 PROCEDURE — 99072 ADDL SUPL MATRL&STAF TM PHE: CPT

## 2021-03-26 PROCEDURE — 76801 OB US < 14 WKS SINGLE FETUS: CPT

## 2021-04-21 NOTE — PATIENT PROFILE ADULT. - PRO INTERPRETER NEED 2
Clinical Product Navigator RN reviewed chart; patient on payer product coverage.  Review results: Met referral criteria for Care Coordinator; referral to be sent.    Sherita Diallo RN/Clinical Product Navigator     English

## 2021-04-22 NOTE — PROGRESS NOTE ADULT - PROBLEM SELECTOR PLAN 1
The mother was called and updated. She verbalized understanding.   CV: Hemodynamically stable, continue to monitor VS.  Pulm: Saturating well on RA. Increase incentive spirometry.  GI: Advance diet as tolerated  : Lyman in place. Adequate UOP  Heme: Continue Venodynes, OOB for DVT ppx.   Neuro: Continue oral meds for pain control.    Jesse, pgy2

## 2021-04-29 ENCOUNTER — APPOINTMENT (OUTPATIENT)
Dept: DERMATOLOGY | Facility: CLINIC | Age: 31
End: 2021-04-29
Payer: COMMERCIAL

## 2021-04-29 PROCEDURE — 99072 ADDL SUPL MATRL&STAF TM PHE: CPT

## 2021-04-29 PROCEDURE — 99213 OFFICE O/P EST LOW 20 MIN: CPT | Mod: 25

## 2021-04-29 PROCEDURE — 17110 DESTRUCTION B9 LES UP TO 14: CPT

## 2021-04-29 NOTE — ASSESSMENT
[FreeTextEntry1] : NUB; Scar\par s/p hemangioma\par education\par PDL today- 5th session \par \par Risks and benefits of Pulsed Dye Laser Therapy were discussed including bruising, swelling, discoloration, lack of response, and need for multiple treatments;\par Pulsed Dye Laser :  Handpiece, 7 mm\par 8.5 Joules\par 1.5 msec;\par Total pulses: 10\par \par Sites: Right Upper arm\par \par \par DF/scar\par education\par \par KP\par education\par discussed amlactin OTC; SED

## 2021-04-29 NOTE — PHYSICAL EXAM
[Alert] : alert [Oriented x 3] : ~L oriented x 3 [Well Nourished] : well nourished [Conjunctiva Non-injected] : conjunctiva non-injected [No Visual Lymphadenopathy] : no visual  lymphadenopathy [No Clubbing] : no clubbing [No Edema] : no edema [No Chromhidrosis] : no chromhidrosis [No Bromhidrosis] : no bromhidrosis [Full Body Skin Exam Performed] : performed [FreeTextEntry3] : AAOx3, pleasant, NAD, no visual lymphadenopathy\par hair, scalp, face, nose, eyelids, ears, lips, oropharynx, neck, chest, abdomen, back, right arm, left arm, nails, and hands examined with all normal findings,\par pertinent findings include:\par \par rounded scar with erythema on lateral border- improved from last visit, flattened \par scar from DF on left forearm\par b/l upper arms with flesh colored papules

## 2021-04-29 NOTE — HISTORY OF PRESENT ILLNESS
[FreeTextEntry1] : lesion right arm [de-identified] : 31 year old female here with lesion on right arm. growing and red. s/p scar from hemangioma. Here for PDL. Was treated with PDL and ILK at last visit with good results. Scar flattened with ILK, patient is pleased with the results. \par also "chicken skin" on arms.

## 2021-05-27 ENCOUNTER — APPOINTMENT (OUTPATIENT)
Dept: DERMATOLOGY | Facility: CLINIC | Age: 31
End: 2021-05-27
Payer: COMMERCIAL

## 2021-05-27 ENCOUNTER — APPOINTMENT (OUTPATIENT)
Dept: ANTEPARTUM | Facility: CLINIC | Age: 31
End: 2021-05-27
Payer: COMMERCIAL

## 2021-05-27 ENCOUNTER — ASOB RESULT (OUTPATIENT)
Age: 31
End: 2021-05-27

## 2021-05-27 DIAGNOSIS — L85.8 OTHER SPECIFIED EPIDERMAL THICKENING: ICD-10-CM

## 2021-05-27 PROCEDURE — 99072 ADDL SUPL MATRL&STAF TM PHE: CPT

## 2021-05-27 PROCEDURE — 17110 DESTRUCTION B9 LES UP TO 14: CPT

## 2021-05-27 PROCEDURE — 76811 OB US DETAILED SNGL FETUS: CPT

## 2021-05-27 PROCEDURE — 99213 OFFICE O/P EST LOW 20 MIN: CPT | Mod: 25

## 2021-05-27 NOTE — ASSESSMENT
[FreeTextEntry1] : NUB; Scar\par s/p hemangioma\par education\par PDL today- 6th session \par \par Risks and benefits of Pulsed Dye Laser Therapy were discussed including bruising, swelling, discoloration, lack of response, and need for multiple treatments;\par Pulsed Dye Laser :  Handpiece, 7 mm\par 8.5 Joules\par 1.5 msec;\par Total pulses: 10\par \par Sites: Right Upper arm\par \par \par DF/scar\par education\par \par KP\par education\par discussed amlactin OTC; SED

## 2021-05-27 NOTE — PHYSICAL EXAM
[Alert] : alert [Oriented x 3] : ~L oriented x 3 [Well Nourished] : well nourished [Conjunctiva Non-injected] : conjunctiva non-injected [No Visual Lymphadenopathy] : no visual  lymphadenopathy [No Clubbing] : no clubbing [No Edema] : no edema [No Bromhidrosis] : no bromhidrosis [No Chromhidrosis] : no chromhidrosis [Full Body Skin Exam Performed] : performed [FreeTextEntry3] : AAOx3, pleasant, NAD, no visual lymphadenopathy\par hair, scalp, face, nose, eyelids, ears, lips, oropharynx, neck, chest, abdomen, back, right arm, left arm, nails, and hands examined with all normal findings,\par pertinent findings include:\par \par rounded scar with erythema on lateral border- improved from last visit, flattened \par scar from DF on left forearm\par b/l upper arms with flesh colored papules

## 2021-05-27 NOTE — HISTORY OF PRESENT ILLNESS
[FreeTextEntry1] : lesion right arm [de-identified] : 31 year old female here with lesion on right arm. growing and red. s/p scar from hemangioma. Here for PDL. Was treated with PDL and ILK at last visit with good results. Scar flattened with ILK, patient is pleased with the results. \par also "chicken skin" on arms.

## 2021-06-01 ENCOUNTER — NON-APPOINTMENT (OUTPATIENT)
Age: 31
End: 2021-06-01

## 2021-07-22 ENCOUNTER — ASOB RESULT (OUTPATIENT)
Age: 31
End: 2021-07-22

## 2021-07-22 ENCOUNTER — APPOINTMENT (OUTPATIENT)
Dept: ANTEPARTUM | Facility: CLINIC | Age: 31
End: 2021-07-22
Payer: COMMERCIAL

## 2021-07-22 PROCEDURE — 76816 OB US FOLLOW-UP PER FETUS: CPT

## 2021-07-22 PROCEDURE — 76820 UMBILICAL ARTERY ECHO: CPT

## 2021-07-22 PROCEDURE — 99072 ADDL SUPL MATRL&STAF TM PHE: CPT

## 2021-09-09 ENCOUNTER — APPOINTMENT (OUTPATIENT)
Dept: ANTEPARTUM | Facility: CLINIC | Age: 31
End: 2021-09-09
Payer: COMMERCIAL

## 2021-09-09 ENCOUNTER — ASOB RESULT (OUTPATIENT)
Age: 31
End: 2021-09-09

## 2021-09-09 PROCEDURE — 76816 OB US FOLLOW-UP PER FETUS: CPT

## 2021-09-20 ENCOUNTER — TRANSCRIPTION ENCOUNTER (OUTPATIENT)
Age: 31
End: 2021-09-20

## 2021-09-22 ENCOUNTER — OUTPATIENT (OUTPATIENT)
Dept: OUTPATIENT SERVICES | Facility: HOSPITAL | Age: 31
LOS: 1 days | End: 2021-09-22
Payer: COMMERCIAL

## 2021-09-22 VITALS
DIASTOLIC BLOOD PRESSURE: 69 MMHG | SYSTOLIC BLOOD PRESSURE: 121 MMHG | HEART RATE: 88 BPM | OXYGEN SATURATION: 89 % | TEMPERATURE: 99 F | RESPIRATION RATE: 19 BRPM

## 2021-09-22 VITALS
SYSTOLIC BLOOD PRESSURE: 118 MMHG | TEMPERATURE: 98 F | RESPIRATION RATE: 18 BRPM | HEART RATE: 91 BPM | DIASTOLIC BLOOD PRESSURE: 75 MMHG

## 2021-09-22 DIAGNOSIS — O26.899 OTHER SPECIFIED PREGNANCY RELATED CONDITIONS, UNSPECIFIED TRIMESTER: ICD-10-CM

## 2021-09-22 DIAGNOSIS — Z98.890 OTHER SPECIFIED POSTPROCEDURAL STATES: Chronic | ICD-10-CM

## 2021-09-22 DIAGNOSIS — Z3A.00 WEEKS OF GESTATION OF PREGNANCY NOT SPECIFIED: ICD-10-CM

## 2021-09-22 PROCEDURE — 59025 FETAL NON-STRESS TEST: CPT

## 2021-09-22 PROCEDURE — 59412 ANTEPARTUM MANIPULATION: CPT

## 2021-09-22 PROCEDURE — G0463: CPT

## 2021-09-22 RX ORDER — SODIUM CHLORIDE 9 MG/ML
1000 INJECTION, SOLUTION INTRAVENOUS
Refills: 0 | Status: DISCONTINUED | OUTPATIENT
Start: 2021-09-22 | End: 2021-10-06

## 2021-09-22 RX ADMIN — SODIUM CHLORIDE 125 MILLILITER(S): 9 INJECTION, SOLUTION INTRAVENOUS at 11:27

## 2021-09-22 RX ADMIN — Medication 0.25 MILLIGRAM(S): at 11:55

## 2021-09-22 NOTE — PROCEDURE NOTE - GENERAL PROCEDURE DETAILS
After informed consent obtained, patient placed in supine position. Terbutaline given. Footling breech presentation with fetal rump in the left lower quadrant. Gently elevated and head guided toward pelvis. Fetal heart rate appeared normal throughout. Forward roll and backwards roll attempted. Persistent breech presentation.

## 2021-09-22 NOTE — PROCEDURE NOTE - ADDITIONAL PROCEDURE DETAILS
Failed ECV  Patient may desire reattempt at time of scheduled .  Will monitor fetus for 90 minutes.     Procedure performed with Dr. Vides

## 2021-09-22 NOTE — OB PROVIDER TRIAGE NOTE - HISTORY OF PRESENT ILLNESS
32yo  @ 37w 3 d presents for scheduled ECV  Denies contractions, VB or LOF has + FM    PNC: Dr Dover  PNI: uncomplicated  PNL: GBS neg      All: Neosporin - hives  Meds: ASA, PNV, Vit C, colace  PMHx: Denies  PSHx: Laminectomy/microdissectomy, LSC OV cystectomy  Socialhx: Denies x 3  OBhx: 2019  FT    6lbs c/b PEC - required labetalol x 6 months  1st tri TOP --> D & C  GYNhx: Denies fibroids, STDs or abnormal paps, h/o ruptured ov cyst      HR: 78 (21 @ 10:14) (78 - 96)  BP: 118/75 (21 @ 09:38) (118/75 - 118/75)  SpO2: 100% (21 @ 10:14) (99% - 100%)    Gen: NAd  Heart: RRR  Lungs: CTA B/L  Abdomen: Gravid, NT  Ext: no calf tenderness    NST: 145 moderate variabitliy + accels no decels  TOCO: none  VE: deferred  BSUS: breech    A/P 32yo  @ 37w 3 d presents for ECV  - NST/TOCO  - MFM consulted for ECV  Valentina Camilo PA-C

## 2021-09-29 ENCOUNTER — OUTPATIENT (OUTPATIENT)
Dept: OUTPATIENT SERVICES | Facility: HOSPITAL | Age: 31
LOS: 1 days | End: 2021-09-29
Payer: COMMERCIAL

## 2021-09-29 VITALS
SYSTOLIC BLOOD PRESSURE: 111 MMHG | DIASTOLIC BLOOD PRESSURE: 79 MMHG | TEMPERATURE: 97 F | HEIGHT: 64 IN | RESPIRATION RATE: 14 BRPM | OXYGEN SATURATION: 99 % | HEART RATE: 99 BPM | WEIGHT: 160.94 LBS

## 2021-09-29 DIAGNOSIS — N83.209 UNSPECIFIED OVARIAN CYST, UNSPECIFIED SIDE: Chronic | ICD-10-CM

## 2021-09-29 DIAGNOSIS — Z87.2 PERSONAL HISTORY OF DISEASES OF THE SKIN AND SUBCUTANEOUS TISSUE: Chronic | ICD-10-CM

## 2021-09-29 DIAGNOSIS — Z01.818 ENCOUNTER FOR OTHER PREPROCEDURAL EXAMINATION: ICD-10-CM

## 2021-09-29 DIAGNOSIS — Z98.890 OTHER SPECIFIED POSTPROCEDURAL STATES: Chronic | ICD-10-CM

## 2021-09-29 LAB
BLD GP AB SCN SERPL QL: NEGATIVE — SIGNIFICANT CHANGE UP
HCT VFR BLD CALC: 38.7 % — SIGNIFICANT CHANGE UP (ref 34.5–45)
HGB BLD-MCNC: 12.9 G/DL — SIGNIFICANT CHANGE UP (ref 11.5–15.5)
MCHC RBC-ENTMCNC: 29.3 PG — SIGNIFICANT CHANGE UP (ref 27–34)
MCHC RBC-ENTMCNC: 33.3 GM/DL — SIGNIFICANT CHANGE UP (ref 32–36)
MCV RBC AUTO: 87.8 FL — SIGNIFICANT CHANGE UP (ref 80–100)
NRBC # BLD: 0 /100 WBCS — SIGNIFICANT CHANGE UP (ref 0–0)
PLATELET # BLD AUTO: 237 K/UL — SIGNIFICANT CHANGE UP (ref 150–400)
RBC # BLD: 4.41 M/UL — SIGNIFICANT CHANGE UP (ref 3.8–5.2)
RBC # FLD: 14.2 % — SIGNIFICANT CHANGE UP (ref 10.3–14.5)
RH IG SCN BLD-IMP: POSITIVE — SIGNIFICANT CHANGE UP
WBC # BLD: 10.77 K/UL — HIGH (ref 3.8–10.5)
WBC # FLD AUTO: 10.77 K/UL — HIGH (ref 3.8–10.5)

## 2021-09-29 PROCEDURE — 86900 BLOOD TYPING SEROLOGIC ABO: CPT

## 2021-09-29 PROCEDURE — 85027 COMPLETE CBC AUTOMATED: CPT

## 2021-09-29 PROCEDURE — 86901 BLOOD TYPING SEROLOGIC RH(D): CPT

## 2021-09-29 PROCEDURE — G0463: CPT

## 2021-09-29 PROCEDURE — 86850 RBC ANTIBODY SCREEN: CPT

## 2021-09-29 RX ORDER — CEFAZOLIN SODIUM 1 G
2000 VIAL (EA) INJECTION ONCE
Refills: 0 | Status: DISCONTINUED | OUTPATIENT
Start: 2021-10-05 | End: 2021-10-05

## 2021-09-29 RX ORDER — OXYTOCIN 10 UNIT/ML
333.33 VIAL (ML) INJECTION
Qty: 20 | Refills: 0 | Status: DISCONTINUED | OUTPATIENT
Start: 2021-10-05 | End: 2021-10-07

## 2021-09-29 RX ORDER — SODIUM CHLORIDE 9 MG/ML
1000 INJECTION, SOLUTION INTRAVENOUS ONCE
Refills: 0 | Status: DISCONTINUED | OUTPATIENT
Start: 2021-09-29 | End: 2021-09-29

## 2021-09-29 RX ORDER — SODIUM CHLORIDE 9 MG/ML
1000 INJECTION, SOLUTION INTRAVENOUS
Refills: 0 | Status: DISCONTINUED | OUTPATIENT
Start: 2021-09-29 | End: 2021-09-29

## 2021-09-29 RX ORDER — FAMOTIDINE 10 MG/ML
20 INJECTION INTRAVENOUS ONCE
Refills: 0 | Status: DISCONTINUED | OUTPATIENT
Start: 2021-09-29 | End: 2021-09-29

## 2021-09-29 RX ORDER — OXYTOCIN 10 UNIT/ML
333.33 VIAL (ML) INJECTION
Qty: 20 | Refills: 0 | Status: DISCONTINUED | OUTPATIENT
Start: 2021-09-29 | End: 2021-09-29

## 2021-09-29 RX ORDER — CEFAZOLIN SODIUM 1 G
2000 VIAL (EA) INJECTION ONCE
Refills: 0 | Status: DISCONTINUED | OUTPATIENT
Start: 2021-09-29 | End: 2021-09-29

## 2021-09-29 RX ORDER — CITRIC ACID/SODIUM CITRATE 300-500 MG
15 SOLUTION, ORAL ORAL ONCE
Refills: 0 | Status: DISCONTINUED | OUTPATIENT
Start: 2021-09-29 | End: 2021-09-29

## 2021-09-29 NOTE — OB PST NOTE - NSICDXPASTSURGICALHX_GEN_ALL_CORE_FT
PAST SURGICAL HISTORY:  Hemorrhagic ovarian cyst s/p laparoscopic surgery    History of keloid of skin s/p excision    History of strabismus surgery childhood    Personal history of spine surgery microdiscectomy, laminectomy L4-5  6/2020    S/P embolization of ovarian artery 2017

## 2021-09-29 NOTE — OB PST NOTE - HISTORY OF PRESENT ILLNESS
30yr old female with large right L4-5 disc herniation with inferior extrusion.  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 pt now coming for  L4-5 lumbar metrx discectomy.    note: covid test 6/27/20 [address] 31yr old female. . EDC=10/10/2021 IUP at 38 weeks gestation with breech presentation, pt presents to PST stating that her OB will attempt an inversion, however if inversion is unsuccessful, pt is scheduled for c/section on 10/5.   Past OB of pre-eclampsia, pt currently on aspirin, will discuss with MFM for preop plan for aspirin.  PSH:  L4-5 lumbar metrx discectomy 2020, pt was evaluated by anesthesiologist, Dr. Molina, please see enclosed anesthesia note, the spine surgery does not prevent pt from having epidural/spinal.  covid test scheduled on 10/3 at [facility].

## 2021-10-03 ENCOUNTER — OUTPATIENT (OUTPATIENT)
Dept: OUTPATIENT SERVICES | Facility: HOSPITAL | Age: 31
LOS: 1 days | End: 2021-10-03
Payer: COMMERCIAL

## 2021-10-03 DIAGNOSIS — Z11.52 ENCOUNTER FOR SCREENING FOR COVID-19: ICD-10-CM

## 2021-10-03 DIAGNOSIS — Z87.2 PERSONAL HISTORY OF DISEASES OF THE SKIN AND SUBCUTANEOUS TISSUE: Chronic | ICD-10-CM

## 2021-10-03 DIAGNOSIS — Z98.890 OTHER SPECIFIED POSTPROCEDURAL STATES: Chronic | ICD-10-CM

## 2021-10-03 DIAGNOSIS — N83.209 UNSPECIFIED OVARIAN CYST, UNSPECIFIED SIDE: Chronic | ICD-10-CM

## 2021-10-03 LAB — SARS-COV-2 RNA SPEC QL NAA+PROBE: SIGNIFICANT CHANGE UP

## 2021-10-03 PROCEDURE — U0003: CPT

## 2021-10-03 PROCEDURE — C9803: CPT

## 2021-10-03 PROCEDURE — U0005: CPT

## 2021-10-04 ENCOUNTER — TRANSCRIPTION ENCOUNTER (OUTPATIENT)
Age: 31
End: 2021-10-04

## 2021-10-05 ENCOUNTER — INPATIENT (INPATIENT)
Facility: HOSPITAL | Age: 31
LOS: 1 days | Discharge: HOME | End: 2021-10-07
Attending: OBSTETRICS & GYNECOLOGY | Admitting: OBSTETRICS & GYNECOLOGY
Payer: COMMERCIAL

## 2021-10-05 VITALS — OXYGEN SATURATION: 99 % | HEART RATE: 80 BPM

## 2021-10-05 DIAGNOSIS — Z98.890 OTHER SPECIFIED POSTPROCEDURAL STATES: Chronic | ICD-10-CM

## 2021-10-05 DIAGNOSIS — N83.209 UNSPECIFIED OVARIAN CYST, UNSPECIFIED SIDE: Chronic | ICD-10-CM

## 2021-10-05 DIAGNOSIS — Z87.2 PERSONAL HISTORY OF DISEASES OF THE SKIN AND SUBCUTANEOUS TISSUE: Chronic | ICD-10-CM

## 2021-10-05 LAB
ALBUMIN SERPL ELPH-MCNC: 3.8 G/DL — SIGNIFICANT CHANGE UP (ref 3.3–5)
ALP SERPL-CCNC: 154 U/L — HIGH (ref 40–120)
ALT FLD-CCNC: 15 U/L — SIGNIFICANT CHANGE UP (ref 10–45)
ANION GAP SERPL CALC-SCNC: 16 MMOL/L — SIGNIFICANT CHANGE UP (ref 5–17)
APTT BLD: 26.5 SEC — LOW (ref 27.5–35.5)
AST SERPL-CCNC: 21 U/L — SIGNIFICANT CHANGE UP (ref 10–40)
BASOPHILS # BLD AUTO: 0.02 K/UL — SIGNIFICANT CHANGE UP (ref 0–0.2)
BASOPHILS NFR BLD AUTO: 0.2 % — SIGNIFICANT CHANGE UP (ref 0–2)
BILIRUB SERPL-MCNC: 0.2 MG/DL — SIGNIFICANT CHANGE UP (ref 0.2–1.2)
BLD GP AB SCN SERPL QL: NEGATIVE — SIGNIFICANT CHANGE UP
BUN SERPL-MCNC: 12 MG/DL — SIGNIFICANT CHANGE UP (ref 7–23)
CALCIUM SERPL-MCNC: 9.3 MG/DL — SIGNIFICANT CHANGE UP (ref 8.4–10.5)
CHLORIDE SERPL-SCNC: 105 MMOL/L — SIGNIFICANT CHANGE UP (ref 96–108)
CO2 SERPL-SCNC: 16 MMOL/L — LOW (ref 22–31)
COVID-19 SPIKE DOMAIN AB INTERP: POSITIVE
COVID-19 SPIKE DOMAIN ANTIBODY RESULT: 156 U/ML — HIGH
CREAT SERPL-MCNC: 0.7 MG/DL — SIGNIFICANT CHANGE UP (ref 0.5–1.3)
EOSINOPHIL # BLD AUTO: 0.07 K/UL — SIGNIFICANT CHANGE UP (ref 0–0.5)
EOSINOPHIL NFR BLD AUTO: 0.6 % — SIGNIFICANT CHANGE UP (ref 0–6)
FIBRINOGEN PPP-MCNC: 669 MG/DL — HIGH (ref 290–520)
GLUCOSE SERPL-MCNC: 81 MG/DL — SIGNIFICANT CHANGE UP (ref 70–99)
HCT VFR BLD CALC: 39.2 % — SIGNIFICANT CHANGE UP (ref 34.5–45)
HGB BLD-MCNC: 13.5 G/DL — SIGNIFICANT CHANGE UP (ref 11.5–15.5)
IMM GRANULOCYTES NFR BLD AUTO: 0.5 % — SIGNIFICANT CHANGE UP (ref 0–1.5)
INR BLD: 0.87 RATIO — LOW (ref 0.88–1.16)
LDH SERPL L TO P-CCNC: 207 U/L — SIGNIFICANT CHANGE UP (ref 50–242)
LYMPHOCYTES # BLD AUTO: 26.3 % — SIGNIFICANT CHANGE UP (ref 13–44)
LYMPHOCYTES # BLD AUTO: 3.27 K/UL — SIGNIFICANT CHANGE UP (ref 1–3.3)
MCHC RBC-ENTMCNC: 29.5 PG — SIGNIFICANT CHANGE UP (ref 27–34)
MCHC RBC-ENTMCNC: 34.4 GM/DL — SIGNIFICANT CHANGE UP (ref 32–36)
MCV RBC AUTO: 85.8 FL — SIGNIFICANT CHANGE UP (ref 80–100)
MONOCYTES # BLD AUTO: 0.74 K/UL — SIGNIFICANT CHANGE UP (ref 0–0.9)
MONOCYTES NFR BLD AUTO: 6 % — SIGNIFICANT CHANGE UP (ref 2–14)
NEUTROPHILS # BLD AUTO: 8.27 K/UL — HIGH (ref 1.8–7.4)
NEUTROPHILS NFR BLD AUTO: 66.4 % — SIGNIFICANT CHANGE UP (ref 43–77)
NRBC # BLD: 0 /100 WBCS — SIGNIFICANT CHANGE UP (ref 0–0)
PLATELET # BLD AUTO: 226 K/UL — SIGNIFICANT CHANGE UP (ref 150–400)
POTASSIUM SERPL-MCNC: 4.3 MMOL/L — SIGNIFICANT CHANGE UP (ref 3.5–5.3)
POTASSIUM SERPL-SCNC: 4.3 MMOL/L — SIGNIFICANT CHANGE UP (ref 3.5–5.3)
PROT SERPL-MCNC: 7.1 G/DL — SIGNIFICANT CHANGE UP (ref 6–8.3)
PROTHROM AB SERPL-ACNC: 10.5 SEC — LOW (ref 10.6–13.6)
RBC # BLD: 4.57 M/UL — SIGNIFICANT CHANGE UP (ref 3.8–5.2)
RBC # FLD: 14 % — SIGNIFICANT CHANGE UP (ref 10.3–14.5)
RH IG SCN BLD-IMP: POSITIVE — SIGNIFICANT CHANGE UP
SARS-COV-2 IGG+IGM SERPL QL IA: 156 U/ML — HIGH
SARS-COV-2 IGG+IGM SERPL QL IA: POSITIVE
SODIUM SERPL-SCNC: 137 MMOL/L — SIGNIFICANT CHANGE UP (ref 135–145)
T PALLIDUM AB TITR SER: NEGATIVE — SIGNIFICANT CHANGE UP
URATE SERPL-MCNC: 6.6 MG/DL — SIGNIFICANT CHANGE UP (ref 2.5–7)
WBC # BLD: 12.43 K/UL — HIGH (ref 3.8–10.5)
WBC # FLD AUTO: 12.43 K/UL — HIGH (ref 3.8–10.5)

## 2021-10-05 RX ORDER — IBUPROFEN 200 MG
600 TABLET ORAL EVERY 6 HOURS
Refills: 0 | Status: COMPLETED | OUTPATIENT
Start: 2021-10-05 | End: 2022-09-03

## 2021-10-05 RX ORDER — OXYCODONE HYDROCHLORIDE 5 MG/1
5 TABLET ORAL ONCE
Refills: 0 | Status: DISCONTINUED | OUTPATIENT
Start: 2021-10-05 | End: 2021-10-07

## 2021-10-05 RX ORDER — LANOLIN
1 OINTMENT (GRAM) TOPICAL EVERY 6 HOURS
Refills: 0 | Status: DISCONTINUED | OUTPATIENT
Start: 2021-10-05 | End: 2021-10-07

## 2021-10-05 RX ORDER — CITRIC ACID/SODIUM CITRATE 300-500 MG
15 SOLUTION, ORAL ORAL ONCE
Refills: 0 | Status: DISCONTINUED | OUTPATIENT
Start: 2021-10-05 | End: 2021-10-05

## 2021-10-05 RX ORDER — OXYCODONE HYDROCHLORIDE 5 MG/1
5 TABLET ORAL
Refills: 0 | Status: COMPLETED | OUTPATIENT
Start: 2021-10-05 | End: 2021-10-12

## 2021-10-05 RX ORDER — DIPHENHYDRAMINE HCL 50 MG
25 CAPSULE ORAL EVERY 6 HOURS
Refills: 0 | Status: DISCONTINUED | OUTPATIENT
Start: 2021-10-05 | End: 2021-10-07

## 2021-10-05 RX ORDER — NALBUPHINE HYDROCHLORIDE 10 MG/ML
2.5 INJECTION, SOLUTION INTRAMUSCULAR; INTRAVENOUS; SUBCUTANEOUS EVERY 6 HOURS
Refills: 0 | Status: DISCONTINUED | OUTPATIENT
Start: 2021-10-05 | End: 2021-10-06

## 2021-10-05 RX ORDER — ONDANSETRON 8 MG/1
4 TABLET, FILM COATED ORAL EVERY 6 HOURS
Refills: 0 | Status: DISCONTINUED | OUTPATIENT
Start: 2021-10-05 | End: 2021-10-06

## 2021-10-05 RX ORDER — SODIUM CHLORIDE 9 MG/ML
1000 INJECTION, SOLUTION INTRAVENOUS
Refills: 0 | Status: DISCONTINUED | OUTPATIENT
Start: 2021-10-05 | End: 2021-10-07

## 2021-10-05 RX ORDER — NALOXONE HYDROCHLORIDE 4 MG/.1ML
0.1 SPRAY NASAL
Refills: 0 | Status: DISCONTINUED | OUTPATIENT
Start: 2021-10-05 | End: 2021-10-06

## 2021-10-05 RX ORDER — DIPHENHYDRAMINE HCL 50 MG
25 CAPSULE ORAL EVERY 4 HOURS
Refills: 0 | Status: DISCONTINUED | OUTPATIENT
Start: 2021-10-05 | End: 2021-10-06

## 2021-10-05 RX ORDER — TETANUS TOXOID, REDUCED DIPHTHERIA TOXOID AND ACELLULAR PERTUSSIS VACCINE, ADSORBED 5; 2.5; 8; 8; 2.5 [IU]/.5ML; [IU]/.5ML; UG/.5ML; UG/.5ML; UG/.5ML
0.5 SUSPENSION INTRAMUSCULAR ONCE
Refills: 0 | Status: DISCONTINUED | OUTPATIENT
Start: 2021-10-05 | End: 2021-10-07

## 2021-10-05 RX ORDER — SODIUM CHLORIDE 9 MG/ML
1000 INJECTION, SOLUTION INTRAVENOUS ONCE
Refills: 0 | Status: DISCONTINUED | OUTPATIENT
Start: 2021-10-05 | End: 2021-10-05

## 2021-10-05 RX ORDER — ACETAMINOPHEN 500 MG
975 TABLET ORAL
Refills: 0 | Status: DISCONTINUED | OUTPATIENT
Start: 2021-10-05 | End: 2021-10-07

## 2021-10-05 RX ORDER — SIMETHICONE 80 MG/1
80 TABLET, CHEWABLE ORAL EVERY 4 HOURS
Refills: 0 | Status: DISCONTINUED | OUTPATIENT
Start: 2021-10-05 | End: 2021-10-07

## 2021-10-05 RX ORDER — OXYTOCIN 10 UNIT/ML
333.33 VIAL (ML) INJECTION
Qty: 20 | Refills: 0 | Status: DISCONTINUED | OUTPATIENT
Start: 2021-10-05 | End: 2021-10-07

## 2021-10-05 RX ORDER — FAMOTIDINE 10 MG/ML
20 INJECTION INTRAVENOUS ONCE
Refills: 0 | Status: DISCONTINUED | OUTPATIENT
Start: 2021-10-05 | End: 2021-10-05

## 2021-10-05 RX ORDER — SODIUM CHLORIDE 9 MG/ML
1000 INJECTION, SOLUTION INTRAVENOUS
Refills: 0 | Status: DISCONTINUED | OUTPATIENT
Start: 2021-10-05 | End: 2021-10-05

## 2021-10-05 RX ORDER — TRIAMCINOLONE 4 MG
10 TABLET ORAL ONCE
Refills: 0 | Status: DISCONTINUED | OUTPATIENT
Start: 2021-10-05 | End: 2021-10-07

## 2021-10-05 RX ORDER — MAGNESIUM HYDROXIDE 400 MG/1
30 TABLET, CHEWABLE ORAL
Refills: 0 | Status: DISCONTINUED | OUTPATIENT
Start: 2021-10-05 | End: 2021-10-07

## 2021-10-05 RX ORDER — DEXAMETHASONE 0.5 MG/5ML
4 ELIXIR ORAL EVERY 6 HOURS
Refills: 0 | Status: DISCONTINUED | OUTPATIENT
Start: 2021-10-05 | End: 2021-10-06

## 2021-10-05 RX ORDER — KETOROLAC TROMETHAMINE 30 MG/ML
30 SYRINGE (ML) INJECTION EVERY 6 HOURS
Refills: 0 | Status: DISCONTINUED | OUTPATIENT
Start: 2021-10-05 | End: 2021-10-06

## 2021-10-05 RX ORDER — HEPARIN SODIUM 5000 [USP'U]/ML
5000 INJECTION INTRAVENOUS; SUBCUTANEOUS EVERY 12 HOURS
Refills: 0 | Status: DISCONTINUED | OUTPATIENT
Start: 2021-10-05 | End: 2021-10-07

## 2021-10-05 RX ORDER — MORPHINE SULFATE 50 MG/1
0.1 CAPSULE, EXTENDED RELEASE ORAL ONCE
Refills: 0 | Status: DISCONTINUED | OUTPATIENT
Start: 2021-10-05 | End: 2021-10-06

## 2021-10-05 RX ORDER — OXYCODONE HYDROCHLORIDE 5 MG/1
5 TABLET ORAL
Refills: 0 | Status: DISCONTINUED | OUTPATIENT
Start: 2021-10-05 | End: 2021-10-06

## 2021-10-05 RX ADMIN — Medication 30 MILLIGRAM(S): at 18:15

## 2021-10-05 RX ADMIN — SODIUM CHLORIDE 125 MILLILITER(S): 9 INJECTION, SOLUTION INTRAVENOUS at 18:29

## 2021-10-05 RX ADMIN — Medication 975 MILLIGRAM(S): at 21:07

## 2021-10-05 RX ADMIN — Medication 975 MILLIGRAM(S): at 15:02

## 2021-10-05 RX ADMIN — HEPARIN SODIUM 5000 UNIT(S): 5000 INJECTION INTRAVENOUS; SUBCUTANEOUS at 17:42

## 2021-10-05 RX ADMIN — Medication 975 MILLIGRAM(S): at 15:32

## 2021-10-05 RX ADMIN — SODIUM CHLORIDE 125 MILLILITER(S): 9 INJECTION, SOLUTION INTRAVENOUS at 11:10

## 2021-10-05 RX ADMIN — Medication 30 MILLIGRAM(S): at 17:42

## 2021-10-05 RX ADMIN — Medication 30 MILLIGRAM(S): at 12:47

## 2021-10-05 RX ADMIN — Medication 975 MILLIGRAM(S): at 21:40

## 2021-10-05 RX ADMIN — Medication 1000 MILLIUNIT(S)/MIN: at 08:00

## 2021-10-05 NOTE — OB RN DELIVERY SUMMARY - NS_SEPSISRSKCALC_OBGYN_ALL_OB_FT
EOS calculated successfully. EOS Risk Factor: 0.5/1000 live births ([facility] national incidence); GA=39w2d; Temp=97.9; ROM=2.8; GBS='Negative'; Antibiotics='No antibiotics or any antibiotics < 2 hrs prior to birth'

## 2021-10-05 NOTE — OB RN PATIENT PROFILE - TOBACCO USE
Problem: Falls - Risk of  Goal: *Absence of Falls  Document Alex Fall Risk and appropriate interventions in the flowsheet.    Outcome: Progressing Towards Goal  Fall Risk Interventions:            Medication Interventions: Patient to call before getting OOB    Elimination Interventions: Call light in reach    History of Falls Interventions: Door open when patient unattended Never smoker

## 2021-10-05 NOTE — OB NEONATOLOGY/PEDIATRICIAN DELIVERY SUMMARY - NSCONDLEAVINGLDA_OBGYN_ALL_OB
Good What Type Of Note Output Would You Prefer (Optional)?: Bullet Format How Severe Is Your Skin Lesion?: mild Has Your Skin Lesion Been Treated?: not been treated Is This A New Presentation, Or A Follow-Up?: Skin Lesion

## 2021-10-05 NOTE — OB RN DELIVERY SUMMARY - NSSELHIDDEN_OBGYN_ALL_OB_FT
[NS_DeliveryAttending1_OBGYN_ALL_OB_FT:[id number]],[NS_DeliveryRN_OBGYN_ALL_OB_FT:[id number]] [NS_DeliveryAttending1_OBGYN_ALL_OB_FT:[id number]],[NS_DeliveryRN_OBGYN_ALL_OB_FT:[id number]],[NS_DeliveryAssist2_OBGYN_ALL_OB_FT:[id number]=]

## 2021-10-05 NOTE — OB PROVIDER DELIVERY SUMMARY - NSPROCPERFORMEDA_OBGYN_ALL_OB
Consider pulmonary consult if not already done in past.   CT chest reviewed. Low Segment Transverse C/S

## 2021-10-05 NOTE — OB PROVIDER H&P - ATTENDING COMMENTS
Note    Called to bedside for pt presenting in active labor with breech presentation.  Pt is a 30 yo P1 @ 39.2 wks with known breech presentation for possible version this AM and/or 1' c/s if attempt failed.  Pt with SROM clear fluid @ 6 cm.  Cat I FHT.    Explained to pt clinical findings of breech in labor and need for urgent c/s delivery - foot presentation in vagina.  Written consent from office reveiwed and confirmed of risks of 1' c/s - bleeding, infection, damage to surrounding organs.  Ansthesia and Staff aware.    -OR prep for urgent c/s for breech in labor  -Private attending jerson Thakur

## 2021-10-05 NOTE — OB RN INTRAOPERATIVE NOTE - NSSELHIDDEN_OBGYN_ALL_OB_FT
[NS_DeliveryAttending1_OBGYN_ALL_OB_FT:[id number]],[NS_DeliveryRN_OBGYN_ALL_OB_FT:[id number]] [NS_DeliveryAttending1_OBGYN_ALL_OB_FT:[id number]],[NS_DeliveryRN_OBGYN_ALL_OB_FT:[id number]],[NS_DeliveryAttending2_OBGYN_ALL_OB_FT:[id number]=]

## 2021-10-05 NOTE — OB NEONATOLOGY/PEDIATRICIAN DELIVERY SUMMARY - NSPEDSNEONOTESA_OBGYN_ALL_OB_FT
39+2 week GA baby girl born to a 32 year old  mother via unscheduled primary  for breech and in labor. Mom is O+, labs negative, GBS negative (). Maternal history significant for increased inhibin A, PPD no medications and allergies. Rupture of membranes at 0326 (~4 hours prior to delivery), highest maternal temp 36.6 degC, EOS 0.05. Baby born breech with good tone and color, delayed cord clamping x 30 seconds, transferred to preheated warmer, dried, suctioned and stimulated. APGAR 9/9.   Admit to mother baby unit for routine care

## 2021-10-05 NOTE — OB PROVIDER DELIVERY SUMMARY - NSPROVIDERDELIVERYNOTE_OBGYN_ALL_OB_FT
Procedure: pLTCS  Preop Dx: breech presentation in labor  EBL:  700ml     IVF:  1.5L crystalloids  UOP: 650ml  Layers of uterine closure: 2  Complications: none  Specimen: none   Findings: grossly normal uterus, BL fallopian tubs, BL ovaries--ketalog injected at incisions site after skin closure to aid with prevention of kelaoid formation  Hemostatic/intraoperative agents: surgicel powder  Baby: F infant, APGARs 9&9, breech, 6#11    Beni Dowling, PGY2 Procedure: pLTCS  Preop Dx: breech presentation in labor  EBL:  700ml     IVF:  1.5L crystalloids  UOP: 650ml  Layers of uterine closure: 2  Complications: none  Specimen: none   Findings: grossly normal uterus, BL fallopian tubs, BL ovaries--ketalog injected at incisions site after skin closure to aid with prevention of kelaoid formation  Hemostatic/intraoperative agents: surgicel powder  Baby: F infant, APGARs 9&9, breech, 6#11    Beni Dowling, PGY2    ATTG note    s/p pLTCS for Breech in labor - viable female infant  Grossly normal adnexa bilaterally  Double layer closure of hysterotomy with Caprosyn  Surgicel powder placed  REapproximation of peritoneum  Fascia closure with vicryl  Subcut closure  Ketalog solution placed at the incision site  EBL-700 cc    MAXIMILIAN Thakur

## 2021-10-05 NOTE — OB PROVIDER H&P - ASSESSMENT
A/P:  31y  @39wks and 2 days gestation presenting with SROM@320am with fetal feet as presenting part. Patient found to be 6cm dilated and breech presentation. Patient taken to OR for pLTCS. While in triage, patient also noted to have elevated BP's. +FM  -Admit to L&D  -Routine labs  -HELLP labs, P/Cr ratio to be sent  -Continue to monitor BP's  -EFM/[place]  -NPO, IVF, Bicitra  -Anesthesia consult  -For pLTCS  Dr. Thakur (service attending) and Dr. Ceballos (PGY4) at bedside  Dr. Hickey made aware and en route to hospital  Margarette Allen PA-C A/P:  31y  @39wks and 2 days gestation presenting with SROM@320am with fetal feet as presenting part. Patient found to be 6cm dilated and breech presentation. Patient taken to OR for pLTCS. While in triage, patient also noted to have elevated BP's. +FM  -Admit to L&D  -Routine labs  -HELLP labs, P/Cr ratio to be sent  -Continue to monitor BP's  -EFM/[place]  -NPO, IVF, Bicitra  -Anesthesia consult  -For pLTCS  Dr. Thakur (service attending) and Dr. Ceballos (PGY4) at bedside  Dr. Hickey made aware and en route to hospital  [facility] PA-C    Attending note.        I arrived at the skin closure of the c section and participated in the care of the patient. Agree with Dr. Thakur assessment and plan for an urgent c section.                                                                                                                                                                                                                                                                                                                                                       TAO Hickey MD

## 2021-10-05 NOTE — OB PROVIDER H&P - HISTORY OF PRESENT ILLNESS
31yr old female. . EDC=10/10/2021 IUP at 38 weeks gestation with breech presentation, pt presents to PST stating that her OB will attempt an inversion, however if inversion is unsuccessful, pt is scheduled for c/section on 10/5.   Past OB of pre-eclampsia, pt currently on aspirin, will discuss with MFM for preop plan for aspirin.  PSH:  L4-5 lumbar metrx discectomy 2020, pt was evaluated by anesthesiologist, Dr. Molina, please see enclosed anesthesia note, the spine surgery does not prevent pt from having epidural/spinal.  covid test scheduled on 10/3 at [facility].      PA Admit Note:  31y  @39wks and 2 days gestation presenting with LOF. Patient reports a gush of clear fluid @320am. She admits to painful contractions starting about an hour later that are ~q5m. She denies VB. PNC c/b breech presentation (s/p failed ECV). Patient is scheduled for another ECV today and if unsuccessful a pLTCS. +FM. GBS -.     POBHx: 2019-, FT, 6#, c/b PEC (required Labetalol x6 months). TOP x1 s/p D&C  PGYNhx: Hx of ruptured ovarian cyst s/p LSC ovarian cystectomy. Denies fibroids, abnormal pap smears, STD's   PMHx: Denies  Medications: PNV, ASA  Allergies: Neosporin-hives  PSHx: Laminectomy/microdissectomy, LSC ovarian cystectomy   Social Hx: Denies etoh/tobacco/drug use. Denies anxiety/depression    Vital Signs Last 24 Hrs  T(C): 36.6 (05 Oct 2021 05:22), Max: 36.6 (05 Oct 2021 05:18)  T(F): 97.88 (05 Oct 2021 05:22), Max: 97.9 (05 Oct 2021 05:18)  HR: 79 (05 Oct 2021 05:45) (64 - 81)  BP: 132/94 (05 Oct 2021 05:35) (132/94 - 133/89)  BP(mean): --  RR: 19 (05 Oct 2021 05:18) (19 - 19)  SpO2: 98% (05 Oct 2021 05:45) (94% - 100%)    General: Uncomfortable with ctx's, A&Ox3  CV: RRR  Lungs: CTA b/l  Abdomen: Soft, NT, gravid    VE: 6/80, grossly ruptured, fetal feet presenting part   Bedside sono: Breech presentation  EFM: 140/moderate variability/+accels/no decels  [place]: q2-3m 31yr old female. . EDC=10/10/2021 IUP at 38 weeks gestation with breech presentation, pt presents to PST stating that her OB will attempt a version, however if version is unsuccessful, pt is scheduled for c/section on 10/5.   Past OB of pre-eclampsia, pt currently on aspirin, will discuss with MFM for preop plan for aspirin.  PSH:  L4-5 lumbar metrx discectomy 2020, pt was evaluated by anesthesiologist, Dr. Molina, please see enclosed anesthesia note, the spine surgery does not prevent pt from having epidural/spinal.  covid test scheduled on 10/3 at [facility].      PA Admit Note:  31y  @39wks and 2 days gestation presenting with LOF. Patient reports a gush of clear fluid @320am. She admits to painful contractions starting about an hour later that are ~q5m. She denies VB. PNC c/b breech presentation (s/p failed ECV). Patient is scheduled for another ECV today and if unsuccessful a pLTCS. +FM. GBS -.     POBHx: 2019-, FT, 6#, c/b PEC (required Labetalol x6 months). TOP x1 s/p D&C  PGYNhx: Hx of ruptured ovarian cyst s/p LSC ovarian cystectomy. Denies fibroids, abnormal pap smears, STD's   PMHx: Denies  Medications: PNV, ASA  Allergies: Neosporin-hives  PSHx: Laminectomy/microdissectomy, LSC ovarian cystectomy   Social Hx: Denies etoh/tobacco/drug use. Denies anxiety/depression    Vital Signs Last 24 Hrs  T(C): 36.6 (05 Oct 2021 05:22), Max: 36.6 (05 Oct 2021 05:18)  T(F): 97.88 (05 Oct 2021 05:22), Max: 97.9 (05 Oct 2021 05:18)  HR: 79 (05 Oct 2021 05:45) (64 - 81)  BP: 132/94 (05 Oct 2021 05:35) (132/94 - 133/89)  BP(mean): --  RR: 19 (05 Oct 2021 05:18) (19 - 19)  SpO2: 98% (05 Oct 2021 05:45) (94% - 100%)    General: Uncomfortable with ctx's, A&Ox3  CV: RRR  Lungs: CTA b/l  Abdomen: Soft, NT, gravid    VE: 6/80, grossly ruptured, fetal feet presenting part   Bedside sono: Breech presentation  EFM: 140/moderate variability/+accels/no decels  [place]: q2-3m

## 2021-10-05 NOTE — OB PROVIDER H&P - NSHPPHYSICALEXAM_GEN_ALL_CORE
Vital Signs Last 24 Hrs  T(C): 36.6 (05 Oct 2021 05:22), Max: 36.6 (05 Oct 2021 05:18)  T(F): 97.88 (05 Oct 2021 05:22), Max: 97.9 (05 Oct 2021 05:18)  HR: 79 (05 Oct 2021 05:45) (64 - 81)  BP: 132/94 (05 Oct 2021 05:35) (132/94 - 133/89)  BP(mean): --  RR: 19 (05 Oct 2021 05:18) (19 - 19)  SpO2: 98% (05 Oct 2021 05:45) (94% - 100%)    General: Uncomfortable with ctx's, A&Ox3  CV: RRR  Lungs: CTA b/l  Abdomen: Soft, NT, gravid

## 2021-10-06 ENCOUNTER — TRANSCRIPTION ENCOUNTER (OUTPATIENT)
Age: 31
End: 2021-10-06

## 2021-10-06 LAB
BASOPHILS # BLD AUTO: 0.03 K/UL — SIGNIFICANT CHANGE UP (ref 0–0.2)
BASOPHILS NFR BLD AUTO: 0.2 % — SIGNIFICANT CHANGE UP (ref 0–2)
EOSINOPHIL # BLD AUTO: 0.02 K/UL — SIGNIFICANT CHANGE UP (ref 0–0.5)
EOSINOPHIL NFR BLD AUTO: 0.1 % — SIGNIFICANT CHANGE UP (ref 0–6)
HCT VFR BLD CALC: 34.6 % — SIGNIFICANT CHANGE UP (ref 34.5–45)
HGB BLD-MCNC: 11.7 G/DL — SIGNIFICANT CHANGE UP (ref 11.5–15.5)
IMM GRANULOCYTES NFR BLD AUTO: 0.7 % — SIGNIFICANT CHANGE UP (ref 0–1.5)
LYMPHOCYTES # BLD AUTO: 13.3 % — SIGNIFICANT CHANGE UP (ref 13–44)
LYMPHOCYTES # BLD AUTO: 2.62 K/UL — SIGNIFICANT CHANGE UP (ref 1–3.3)
MCHC RBC-ENTMCNC: 29.8 PG — SIGNIFICANT CHANGE UP (ref 27–34)
MCHC RBC-ENTMCNC: 33.8 GM/DL — SIGNIFICANT CHANGE UP (ref 32–36)
MCV RBC AUTO: 88 FL — SIGNIFICANT CHANGE UP (ref 80–100)
MONOCYTES # BLD AUTO: 0.68 K/UL — SIGNIFICANT CHANGE UP (ref 0–0.9)
MONOCYTES NFR BLD AUTO: 3.5 % — SIGNIFICANT CHANGE UP (ref 2–14)
NEUTROPHILS # BLD AUTO: 16.19 K/UL — HIGH (ref 1.8–7.4)
NEUTROPHILS NFR BLD AUTO: 82.2 % — HIGH (ref 43–77)
NRBC # BLD: 0 /100 WBCS — SIGNIFICANT CHANGE UP (ref 0–0)
PLATELET # BLD AUTO: 226 K/UL — SIGNIFICANT CHANGE UP (ref 150–400)
RBC # BLD: 3.93 M/UL — SIGNIFICANT CHANGE UP (ref 3.8–5.2)
RBC # FLD: 14.3 % — SIGNIFICANT CHANGE UP (ref 10.3–14.5)
WBC # BLD: 19.67 K/UL — HIGH (ref 3.8–10.5)
WBC # FLD AUTO: 19.67 K/UL — HIGH (ref 3.8–10.5)

## 2021-10-06 RX ORDER — OXYCODONE HYDROCHLORIDE 5 MG/1
5 TABLET ORAL
Refills: 0 | Status: DISCONTINUED | OUTPATIENT
Start: 2021-10-06 | End: 2021-10-07

## 2021-10-06 RX ORDER — IBUPROFEN 200 MG
1 TABLET ORAL
Qty: 0 | Refills: 0 | DISCHARGE
Start: 2021-10-06

## 2021-10-06 RX ORDER — ACETAMINOPHEN 500 MG
3 TABLET ORAL
Qty: 0 | Refills: 0 | DISCHARGE
Start: 2021-10-06

## 2021-10-06 RX ORDER — DOCUSATE SODIUM 100 MG
0 CAPSULE ORAL
Qty: 0 | Refills: 0 | DISCHARGE

## 2021-10-06 RX ORDER — ASPIRIN/CALCIUM CARB/MAGNESIUM 324 MG
1 TABLET ORAL
Qty: 0 | Refills: 0 | DISCHARGE

## 2021-10-06 RX ORDER — LANOLIN
1 OINTMENT (GRAM) TOPICAL
Qty: 0 | Refills: 0 | DISCHARGE
Start: 2021-10-06

## 2021-10-06 RX ORDER — IBUPROFEN 200 MG
600 TABLET ORAL EVERY 6 HOURS
Refills: 0 | Status: DISCONTINUED | OUTPATIENT
Start: 2021-10-06 | End: 2021-10-07

## 2021-10-06 RX ORDER — SIMETHICONE 80 MG/1
1 TABLET, CHEWABLE ORAL
Qty: 0 | Refills: 0 | DISCHARGE
Start: 2021-10-06

## 2021-10-06 RX ADMIN — Medication 975 MILLIGRAM(S): at 03:26

## 2021-10-06 RX ADMIN — Medication 975 MILLIGRAM(S): at 08:53

## 2021-10-06 RX ADMIN — OXYCODONE HYDROCHLORIDE 5 MILLIGRAM(S): 5 TABLET ORAL at 22:17

## 2021-10-06 RX ADMIN — Medication 600 MILLIGRAM(S): at 17:40

## 2021-10-06 RX ADMIN — Medication 30 MILLIGRAM(S): at 00:45

## 2021-10-06 RX ADMIN — Medication 975 MILLIGRAM(S): at 15:57

## 2021-10-06 RX ADMIN — Medication 975 MILLIGRAM(S): at 20:25

## 2021-10-06 RX ADMIN — OXYCODONE HYDROCHLORIDE 5 MILLIGRAM(S): 5 TABLET ORAL at 16:21

## 2021-10-06 RX ADMIN — Medication 975 MILLIGRAM(S): at 03:50

## 2021-10-06 RX ADMIN — Medication 30 MILLIGRAM(S): at 00:18

## 2021-10-06 RX ADMIN — Medication 600 MILLIGRAM(S): at 18:30

## 2021-10-06 RX ADMIN — Medication 30 MILLIGRAM(S): at 05:48

## 2021-10-06 RX ADMIN — HEPARIN SODIUM 5000 UNIT(S): 5000 INJECTION INTRAVENOUS; SUBCUTANEOUS at 17:39

## 2021-10-06 RX ADMIN — Medication 30 MILLIGRAM(S): at 06:14

## 2021-10-06 RX ADMIN — OXYCODONE HYDROCHLORIDE 5 MILLIGRAM(S): 5 TABLET ORAL at 16:51

## 2021-10-06 RX ADMIN — Medication 975 MILLIGRAM(S): at 14:57

## 2021-10-06 RX ADMIN — OXYCODONE HYDROCHLORIDE 5 MILLIGRAM(S): 5 TABLET ORAL at 23:00

## 2021-10-06 RX ADMIN — Medication 975 MILLIGRAM(S): at 09:53

## 2021-10-06 RX ADMIN — Medication 600 MILLIGRAM(S): at 12:18

## 2021-10-06 RX ADMIN — SIMETHICONE 80 MILLIGRAM(S): 80 TABLET, CHEWABLE ORAL at 14:58

## 2021-10-06 RX ADMIN — Medication 1 TABLET(S): at 12:18

## 2021-10-06 RX ADMIN — Medication 600 MILLIGRAM(S): at 13:18

## 2021-10-06 RX ADMIN — HEPARIN SODIUM 5000 UNIT(S): 5000 INJECTION INTRAVENOUS; SUBCUTANEOUS at 05:48

## 2021-10-06 RX ADMIN — Medication 975 MILLIGRAM(S): at 21:00

## 2021-10-06 NOTE — DISCHARGE NOTE OB - MEDICATION SUMMARY - MEDICATIONS TO TAKE
I will START or STAY ON the medications listed below when I get home from the hospital:    ibuprofen 600 mg oral tablet  -- 1 tab(s) by mouth every 6 hours  -- Indication: For Pain    acetaminophen 325 mg oral tablet  -- 3 tab(s) by mouth   -- Indication: For Pain    lanolin topical ointment  -- 1 application on skin every 6 hours, As needed, Sore Nipples  -- Indication: For nipple discharge    Prenatal Multivitamins with Folic Acid 1 mg oral tablet  -- 1 tab(s) by mouth once a day  -- Indication: For Postpartum    simethicone 80 mg oral tablet, chewable  -- 1 tab(s) by mouth every 4 hours, As needed, Gas  -- Indication: For gas pain

## 2021-10-06 NOTE — DISCHARGE NOTE OB - PATIENT PORTAL LINK FT
You can access the FollowMyHealth Patient Portal offered by [facility] by registering at the following website: [email or web address]. By joining [employer]’s FollowMyHealth portal, you will also be able to view your health information using other applications (apps) compatible with our system.

## 2021-10-06 NOTE — DISCHARGE NOTE OB - PLAN OF CARE
diet and activities as discussed and tolerated.  please call office to schedule postpartum followup 5 weeks after delivery or earlier if needed.  Please also schedule 2 week telehealth appointment for incision check

## 2021-10-06 NOTE — DISCHARGE NOTE OB - CARE PLAN
1 Principal Discharge DX:	 delivery delivered  Assessment and plan of treatment:	diet and activities as discussed and tolerated.  please call office to schedule postpartum followup 5 weeks after delivery or earlier if needed.  Please also schedule 2 week telehealth appointment for incision check

## 2021-10-06 NOTE — DISCHARGE NOTE OB - HOSPITAL COURSE
Pt underwent an uncomplicated  delivery.  Please refer to delivery summary and operative report for details.    Pt did well postpartum.  She was voiding, ambulating and tolerating regular diet.  Her pain is well controlled and she remained afebrile.  Pt was discharged home in stable condition and will follow up in office for her routine postpartum care.

## 2021-10-06 NOTE — DISCHARGE NOTE OB - MEDICATION SUMMARY - MEDICATIONS TO STOP TAKING
I will STOP taking the medications listed below when I get home from the hospital:    aspirin 81 mg oral tablet  -- 1 tab(s) by mouth once a day    iron  -- 1 tab(s) by mouth once a day

## 2021-10-06 NOTE — LACTATION INITIAL EVALUATION - LACTATION INTERVENTIONS
Lactation support provided at pts bedside. Discussed normal infant feeding behaviors ,recognition of hunger cues,proper positioning,and signs of adequate intake./initiate/review safe skin-to-skin/initiate/review techniques for position and latch/reviewed components of an effective feeding and at least 8 effective feedings per day required/reviewed importance of monitoring infant diapers, the breastfeeding log, and minimum output each day/reviewed benefits and recommendations for rooming in/reviewed feeding on demand/by cue at least 8 times a day

## 2021-10-06 NOTE — PROGRESS NOTE ADULT - ASSESSMENT
A/P: 32yo  now POD#1 s/p pLTCS for breech presentation.  Patient is stable and doing well post-operatively.      #PP care  - Continue regular diet.  - Increase ambulation.  - Continue motrin, tylenol, oxycodone PRN for pain control.    - AM CBC is appropriate for EBL.    Shilpi Lobo, PGY1

## 2021-10-06 NOTE — PROGRESS NOTE ADULT - SUBJECTIVE AND OBJECTIVE BOX
OB Progress Note:  Delivery, POD#1    S: 32yo  now POD#1 s/p pLTCS for breech presentation. Her pain is well controlled. She is tolerating a regular diet and passing flatus. Denies N/V. Denies CP/SOB/lightheadedness/dizziness. She is ambulating without difficulty. Voiding spontaneously.     O:   Vital Signs Last 24 Hrs  T(C): 36.6 (06 Oct 2021 05:22), Max: 37 (05 Oct 2021 12:50)  T(F): 97.8 (06 Oct 2021 05:22), Max: 98.6 (05 Oct 2021 12:50)  HR: 62 (06 Oct 2021 05:22) (48 - 68)  BP: 111/63 (06 Oct 2021 05:22) (103/73 - 143/82)  BP(mean): 94 (05 Oct 2021 12:20) (82 - 104)  RR: 18 (06 Oct 2021 05:22) (13 - 19)  SpO2: 98% (06 Oct 2021 05:22) (96% - 100%)    Labs:  Blood type: O Positive  Rubella IgG: RPR: Negative                          11.7   19.67<H> >-----------< 226    ( 10-06 @ 06:39 )             34.6                        13.5   12.43<H> >-----------< 226    ( 10-05 @ 05:52 )             39.2    10-05-21 @ 05:52      137  |  105  |  12  ----------------------------<  81  4.3   |  16<L>  |  0.70        Ca    9.3      05 Oct 2021 05:52    TPro  7.1  /  Alb  3.8  /  TBili  0.2  /  DBili  x   /  AST  21  /  ALT  15  /  AlkPhos  154<H>  10-05-21 @ 05:52          PE:  General: NAD  Abdomen: Mildly distended, appropriately tender, incision c/d/i.  Extremities: No erythema, no pitting edema

## 2021-10-06 NOTE — DISCHARGE NOTE OB - CARE PROVIDER_API CALL
Gamal Hickey)  Obstetrics and Gynecology  [address] [place] [address], [place] [address]  Phone: [phone]  Fax: [phone]  Follow Up Time:

## 2021-10-07 VITALS
OXYGEN SATURATION: 98 % | SYSTOLIC BLOOD PRESSURE: 122 MMHG | DIASTOLIC BLOOD PRESSURE: 74 MMHG | TEMPERATURE: 98 F | RESPIRATION RATE: 18 BRPM | HEART RATE: 60 BPM

## 2021-10-07 PROCEDURE — 85610 PROTHROMBIN TIME: CPT

## 2021-10-07 PROCEDURE — 59050 FETAL MONITOR W/REPORT: CPT

## 2021-10-07 PROCEDURE — 85384 FIBRINOGEN ACTIVITY: CPT

## 2021-10-07 PROCEDURE — 86780 TREPONEMA PALLIDUM: CPT

## 2021-10-07 PROCEDURE — 86850 RBC ANTIBODY SCREEN: CPT

## 2021-10-07 PROCEDURE — 59025 FETAL NON-STRESS TEST: CPT

## 2021-10-07 PROCEDURE — 84550 ASSAY OF BLOOD/URIC ACID: CPT

## 2021-10-07 PROCEDURE — 83615 LACTATE (LD) (LDH) ENZYME: CPT

## 2021-10-07 PROCEDURE — 85730 THROMBOPLASTIN TIME PARTIAL: CPT

## 2021-10-07 PROCEDURE — 86769 SARS-COV-2 COVID-19 ANTIBODY: CPT

## 2021-10-07 PROCEDURE — 86901 BLOOD TYPING SEROLOGIC RH(D): CPT

## 2021-10-07 PROCEDURE — 85025 COMPLETE CBC W/AUTO DIFF WBC: CPT

## 2021-10-07 PROCEDURE — 86900 BLOOD TYPING SEROLOGIC ABO: CPT

## 2021-10-07 PROCEDURE — 80053 COMPREHEN METABOLIC PANEL: CPT

## 2021-10-07 RX ORDER — OXYCODONE HYDROCHLORIDE 5 MG/1
1 TABLET ORAL
Qty: 5 | Refills: 0
Start: 2021-10-07 | End: 2021-10-08

## 2021-10-07 RX ADMIN — HEPARIN SODIUM 5000 UNIT(S): 5000 INJECTION INTRAVENOUS; SUBCUTANEOUS at 06:22

## 2021-10-07 RX ADMIN — MAGNESIUM HYDROXIDE 30 MILLILITER(S): 400 TABLET, CHEWABLE ORAL at 03:19

## 2021-10-07 RX ADMIN — Medication 1 TABLET(S): at 11:50

## 2021-10-07 RX ADMIN — Medication 600 MILLIGRAM(S): at 00:09

## 2021-10-07 RX ADMIN — Medication 600 MILLIGRAM(S): at 11:50

## 2021-10-07 RX ADMIN — OXYCODONE HYDROCHLORIDE 5 MILLIGRAM(S): 5 TABLET ORAL at 11:50

## 2021-10-07 RX ADMIN — Medication 600 MILLIGRAM(S): at 12:50

## 2021-10-07 RX ADMIN — Medication 975 MILLIGRAM(S): at 08:47

## 2021-10-07 RX ADMIN — Medication 975 MILLIGRAM(S): at 14:09

## 2021-10-07 RX ADMIN — Medication 600 MILLIGRAM(S): at 06:22

## 2021-10-07 RX ADMIN — Medication 975 MILLIGRAM(S): at 04:00

## 2021-10-07 RX ADMIN — Medication 600 MILLIGRAM(S): at 00:45

## 2021-10-07 RX ADMIN — Medication 600 MILLIGRAM(S): at 07:05

## 2021-10-07 RX ADMIN — OXYCODONE HYDROCHLORIDE 5 MILLIGRAM(S): 5 TABLET ORAL at 10:50

## 2021-10-07 RX ADMIN — Medication 975 MILLIGRAM(S): at 03:18

## 2021-10-07 RX ADMIN — Medication 975 MILLIGRAM(S): at 09:38

## 2021-10-07 NOTE — PROGRESS NOTE ADULT - SUBJECTIVE AND OBJECTIVE BOX
OB Progress Note:  Delivery, POD#2    S: 30yo  now POD#2 s/p pLTCS for breech presentation. Her pain is well controlled. She is tolerating a regular diet and passing flatus. Denies N/V. Denies CP/SOB/lightheadedness/dizziness. She is ambulating without difficulty. Voiding spontaneously.     O:   Vital Signs Last 24 Hrs  T(C): 36.6 (07 Oct 2021 05:58), Max: 36.7 (06 Oct 2021 09:00)  T(F): 97.8 (07 Oct 2021 05:58), Max: 98.1 (06 Oct 2021 09:00)  HR: 60 (07 Oct 2021 05:58) (60 - 80)  BP: 122/74 (07 Oct 2021 05:58) (121/82 - 126/85)  BP(mean): --  RR: 18 (07 Oct 2021 05:58) (18 - 18)  SpO2: 98% (07 Oct 2021 05:58) (98% - 99%)    Labs:  Blood type: O Positive  Rubella IgG: RPR: Negative                   11.7   19.67 )-----------( 226      ( 10 @ 06:39 )             34.6                13.5   12.43 )-----------( 226      ( 10-05 @ 05:52 )             39.2         PE:  General: NAD  Abdomen: Mildly distended, appropriately tender, incision c/d/i.  Extremities: No erythema, no pitting edema

## 2021-10-07 NOTE — PROGRESS NOTE ADULT - ATTENDING COMMENTS
I personally saw and evaluated pt and agree with Dr Lobo's assessment and plan  Pt is doing well on POD 1 after c/s for malpresentation  Her pain is well controlled and she is w/o complaints  Continue routine PO care
Pt seen and examined.  agree with note above.  pt doing well  routine PP care.  will dc home  postpartum instruction and when to call md disc.

## 2021-10-07 NOTE — PROGRESS NOTE ADULT - ASSESSMENT
A/P: 32yo  now POD#2 s/p pLTCS for breech presentation.  Patient is stable and doing well post-operatively.      #PP care  - Continue regular diet.  - Increase ambulation.  - Continue motrin, tylenol, oxycodone PRN for pain control.    - Discharge planning.    Shilpi Lobo, PGY1

## 2021-10-27 ENCOUNTER — APPOINTMENT (OUTPATIENT)
Dept: DERMATOLOGY | Facility: CLINIC | Age: 31
End: 2021-10-27
Payer: COMMERCIAL

## 2021-10-27 PROBLEM — O14.90 UNSPECIFIED PRE-ECLAMPSIA, UNSPECIFIED TRIMESTER: Chronic | Status: ACTIVE | Noted: 2021-09-29

## 2021-10-27 PROCEDURE — 11900 INJECT SKIN LESIONS </W 7: CPT

## 2021-10-27 PROCEDURE — 99213 OFFICE O/P EST LOW 20 MIN: CPT | Mod: 25

## 2021-10-27 NOTE — ASSESSMENT
[FreeTextEntry1] : NUB; Scar\par s/p hemangioma\par education\par PDL today- 7th session \par \par Risks and benefits of Pulsed Dye Laser Therapy were discussed including bruising, swelling, discoloration, lack of response, and need for multiple treatments;\par Pulsed Dye Laser :  Handpiece, 7 mm\par 8.5 Joules\par 1.5 msec;\par Total pulses: 10\par \par Sites: Right Upper arm and left wrist\par \par \par keloid\par Risks and benefits were discussed including including atrophy, discoloration\par Intralesional triamcinolone, concentration   10 mg/cc;\par Total volume  0.1    cc\par Sites: 1\par

## 2021-10-27 NOTE — PHYSICAL EXAM
[Alert] : alert [Oriented x 3] : ~L oriented x 3 [Well Nourished] : well nourished [Conjunctiva Non-injected] : conjunctiva non-injected [No Visual Lymphadenopathy] : no visual  lymphadenopathy [No Clubbing] : no clubbing [No Edema] : no edema [No Bromhidrosis] : no bromhidrosis [No Chromhidrosis] : no chromhidrosis [Full Body Skin Exam Performed] : performed [FreeTextEntry3] : AAOx3, pleasant, NAD, no visual lymphadenopathy\par hair, scalp, face, nose, eyelids, ears, lips, oropharynx, neck, chest, abdomen, back, right arm, left arm, nails, and hands examined with all normal findings,\par pertinent findings include:\par \par rounded scar with erythema on lateral border- improved from last visit, flattened \par scar from DF on left wrist

## 2021-10-27 NOTE — HISTORY OF PRESENT ILLNESS
[FreeTextEntry1] : lesion right arm [de-identified] : 31 year old female here with lesion on right arm. growing and red. s/p scar from hemangioma. Here for PDL. Was treated with PDL and ILK at last visit with good results. Scar flattened with ILK, patient is pleased with the results. \par also with scar on left wrist.

## 2021-12-08 ENCOUNTER — APPOINTMENT (OUTPATIENT)
Dept: DERMATOLOGY | Facility: CLINIC | Age: 31
End: 2021-12-08
Payer: COMMERCIAL

## 2021-12-08 DIAGNOSIS — L24.9 IRRITANT CONTACT DERMATITIS, UNSPECIFIED CAUSE: ICD-10-CM

## 2021-12-08 PROCEDURE — 11900 INJECT SKIN LESIONS </W 7: CPT | Mod: 59

## 2021-12-08 PROCEDURE — 17110 DESTRUCTION B9 LES UP TO 14: CPT | Mod: 59

## 2021-12-08 PROCEDURE — 99213 OFFICE O/P EST LOW 20 MIN: CPT | Mod: 25

## 2021-12-08 NOTE — PHYSICAL EXAM
[Alert] : alert [Oriented x 3] : ~L oriented x 3 [Well Nourished] : well nourished [Conjunctiva Non-injected] : conjunctiva non-injected [No Visual Lymphadenopathy] : no visual  lymphadenopathy [No Clubbing] : no clubbing [No Edema] : no edema [No Bromhidrosis] : no bromhidrosis [No Chromhidrosis] : no chromhidrosis [Full Body Skin Exam Performed] : performed [FreeTextEntry3] : AAOx3, pleasant, NAD, no visual lymphadenopathy\par hair, scalp, face, nose, eyelids, ears, lips, oropharynx, neck, chest, abdomen, back, right arm, left arm, nails, and hands examined with all normal findings,\par pertinent findings include:\par \par rounded scar with erythema on lateral border- improved from last visit, flattened \par scar from DF on left wrist\par xerosis of hands

## 2021-12-08 NOTE — HISTORY OF PRESENT ILLNESS
[FreeTextEntry1] : lesion right arm [de-identified] : 31 year old female here with lesion on right arm. growing and red. s/p scar from hemangioma. Here for PDL. Was treated with PDL and ILK at last visit with good results. Scar flattened with ILK, patient is pleased with the results. \par also with scar on left wrist.\par also dry hands.

## 2021-12-08 NOTE — ASSESSMENT
[FreeTextEntry1] : NUB; Scar\par s/p hemangioma\par education\par PDL today- 8th session \par \par Risks and benefits of Pulsed Dye Laser Therapy were discussed including bruising, swelling, discoloration, lack of response, and need for multiple treatments;\par Pulsed Dye Laser :  Handpiece, 7 mm\par 8.5 Joules\par 1.5 msec;\par Total pulses: 10\par \par Sites: Right Upper arm and left wrist\par \par \par keloid\par Risks and benefits were discussed including including atrophy, discoloration\par Intralesional triamcinolone, concentration   10 mg/cc;\par Total volume  0.1    cc\par Sites: 1\par \par irritant derm\par -education\par -gentle skin care reviewed\par xerosis\par

## 2022-01-13 ENCOUNTER — APPOINTMENT (OUTPATIENT)
Dept: DERMATOLOGY | Facility: CLINIC | Age: 32
End: 2022-01-13
Payer: COMMERCIAL

## 2022-01-13 PROCEDURE — 11900 INJECT SKIN LESIONS </W 7: CPT | Mod: 59

## 2022-01-13 PROCEDURE — 17110 DESTRUCTION B9 LES UP TO 14: CPT | Mod: 59

## 2022-01-13 PROCEDURE — 99213 OFFICE O/P EST LOW 20 MIN: CPT | Mod: 25

## 2022-01-13 NOTE — ASSESSMENT
[FreeTextEntry1] : NUB; Scar\par s/p hemangioma\par education\par PDL today- 9th session \par \par Risks and benefits of Pulsed Dye Laser Therapy were discussed including bruising, swelling, discoloration, lack of response, and need for multiple treatments;\par Pulsed Dye Laser :  Handpiece, 7 mm\par 8.5 Joules\par 1.5 msec;\par Total pulses: 10\par \par Sites: Right Upper arm and left wrist\par \par \par keloid\par Risks and benefits were discussed including including atrophy, discoloration\par Intralesional triamcinolone, concentration   10 mg/cc;\par Total volume  0.1    cc\par Sites: 1\par \par irritant derm\par -education\par -gentle skin care reviewed\par xerosis\par

## 2022-01-13 NOTE — HISTORY OF PRESENT ILLNESS
[FreeTextEntry1] : lesion right arm [de-identified] : 32 year old female here with lesion on right arm. growing and red. s/p scar from hemangioma. Here for PDL. Was treated with PDL and ILK at last visit with good results. Scar flattened with ILK, patient is pleased with the results. \par also with scar on left wrist.\par also dry hands.

## 2022-02-07 ENCOUNTER — TRANSCRIPTION ENCOUNTER (OUTPATIENT)
Age: 32
End: 2022-02-07

## 2022-03-03 ENCOUNTER — APPOINTMENT (OUTPATIENT)
Dept: DERMATOLOGY | Facility: CLINIC | Age: 32
End: 2022-03-03
Payer: COMMERCIAL

## 2022-03-03 DIAGNOSIS — D48.9 NEOPLASM OF UNCERTAIN BEHAVIOR, UNSPECIFIED: ICD-10-CM

## 2022-03-03 PROCEDURE — 99213 OFFICE O/P EST LOW 20 MIN: CPT | Mod: 25

## 2022-03-03 PROCEDURE — 17110 DESTRUCTION B9 LES UP TO 14: CPT

## 2022-03-07 PROBLEM — D48.9 NEOPLASM OF UNCERTAIN BEHAVIOR: Status: ACTIVE | Noted: 2020-11-19

## 2022-03-18 NOTE — LACTATION INITIAL EVALUATION - NS LACT CON REASON FOR REQ
[facility] Hospitalist Progress Note      Patient Active Problem List   Diagnosis   • Chronic systolic CHF (congestive heart failure) (CMS/HCC)   • H/O prosthetic aortic valve replacement   • Coronary artery disease involving native coronary artery   • Altered mental status        Subjective    Pt seen and examined more awake more responsive than yesterday  Too much of effort to get him up and about  Not following much commands  Not short of breath  Was hypotensive requiring fluid bolus better  Blood cultures urine cultures are all unremarkable patient received 3 days of IV antibiotics with Rocephin    Physical Exam    Vitals with min/max:    Vital Last Value 24 Hour Range   Temperature 98.8 °F (37.1 °C) (03/18/22 0633) Temp  Min: 98.2 °F (36.8 °C)  Max: 100 °F (37.8 °C)   Pulse 70 (03/18/22 0633) Pulse  Min: 70  Max: 93   Respiratory 16 (03/18/22 0633) Resp  Min: 16  Max: 18   Non-Invasive  Blood Pressure 117/53 (03/18/22 0633) BP  Min: 92/54  Max: 117/53   Pulse Oximetry 96 % (03/18/22 0633) SpO2  Min: 93 %  Max: 96 %   Arterial   Blood Pressure   No data recorded        I/O's:    Intake/Output Summary (Last 24 hours) at 3/18/2022 0858  Last data filed at 3/18/2022 0852  Gross per 24 hour   Intake --   Output 1250 ml   Net -1250 ml       Physical Exam  Vitals signs reviewed.   Constitutional:       Appearance: confused  Oriented  Times  One moderately obese  HENT:      Head: Normocephalic.      Right Ear: Tympanic membrane normal.      Nose: Nose normal.      Mouth/Throat:      Mouth: Mucous membranes are dry    Eyes:      Pupils: Pupils are equal, round, and reactive to light.   Neck:      Musculoskeletal: Normal range of motion.   Cardiovascular:      Rate and Rhythm: Normal rate.  Systolic murmur     Pulses: Normal pulses.   Pulmonary:      Effort: Pulmonary effort is normal.   Abdominal:      General: Abdomen is obese . Bowel sounds are normal.      Palpations: Abdomen is soft.   Genitourinary: Ryder  catheter draining clear fluid     Rectum: Normal.   Musculoskeletal: Normal range of motion.   Skin:     General: Skin is warm.   Neurological: Parkinson features slow to respond     General: No focal deficit present.      Mental Status: Awake confused  Psychiatric:         Mood and Affect: Mood  Confused      Visit Vitals  /53   Pulse 70   Temp 98.8 °F (37.1 °C) (Oral)   Resp 16   Ht 5' 6\" (1.676 m)   Wt 76 kg (167 lb 8.8 oz)   SpO2 96%   BMI 27.04 kg/m²          Current Medications:    • sodium polystyrene sulfonate  15 g Oral Once   • heparin (porcine)  5,000 Units Subcutaneous 3 times per day   • aspirin  81 mg Oral Daily   • carbidopa-levodopa  1 tablet Oral TID   • donepezil  10 mg Oral Nightly   • pravastatin  20 mg Oral Nightly   • magnesium citrate  300 mL Oral Once   • cefTRIAXone  1,000 mg Intravenous Daily   • sodium chloride (PF)  2 mL Intracatheter 2 times per day       • sodium chloride 0.9% infusion 100 mL/hr at 03/18/22 0428       Labs:    Recent Labs     03/17/22  0652 03/18/22  0725   WBC 10.9 9.3   HGB 7.5* 7.4*   HCT 23.1* 23.3*    171   .8* 103.6*       Recent Labs   Lab 03/18/22  0725 03/17/22  0652 03/16/22  1409   SODIUM 141 142 139   POTASSIUM 4.3 4.8 5.3*   CHLORIDE 114* 114* 111*   CO2 21 22 23   BUN 50* 70* 75*   CREATININE 2.30* 3.05* 3.57*   GLUCOSE 85 83 101*   CALCIUM 8.3* 8.1* 8.5       Recent Labs   Lab 03/15/22  2255   NTPROB 5,472*        Recent Labs   Lab 03/15/22  2255   AST 41*       Recent Labs     03/15/22  2255   INR 1.0   PT 11.1   PTT 25       No results available in last 24 hours      Microbiology Results     None          Imaging    US KIDNEY BILATERAL   Final Result   Mildly limited study.  Within this constraint, no renal pathology    identified.         Electronically signed by Molina Altamirano M.D. on 03 16 22 at 04:03      CT ABDOMEN PELVIS WO CONTRAST   Final Result   Hiatal hernia is present.  No acute intra-abdominal inflammatory    stranding  identified.  No abscess, or diverticulitis         Electronically signed by Chace Dillard D.O. on 03 16 22 at 00:56      CT HEAD WO CONTRAST   Final Result   Moderate volume loss and microvascular changes are present.  No    intracranial hemorrhage            Electronically signed by Chace Dillard D.O. on 03 16 22 at 00:41      XR CHEST PA OR AP 1 VIEW   Final Result   FINDINGS/IMPRESSION:      There is a left-sided defibrillator.  There are mid-sternal sutures.  There   is a metallic valve prosthesis.  There are multiple metallic surgical   clips.  There is evidence of a previous lower lumbar posterior fusion.    There are degenerative changes of the thoracolumbar spine.  The heart size   is normal.  There is tortuosity of the thoracic aorta.      There is a moderate size hiatal hernia.      There is no evidence of infiltrate or pneumothorax.      Electronically Signed by: PLACIDO CERON MD    Signed on: 3/15/2022 11:09 PM              LAST ECHO/ECHO STRESS:  No valid procedures specified.        Encounter Date: 03/15/22   Electrocardiogram 12-Lead   Result Value    Ventricular Rate EKG/Min (BPM) 78    Atrial Rate (BPM) 78    PA-Interval (MSEC) 228    QRS-Interval (MSEC) 146    QT-Interval (MSEC) 454    QTc 517    P Axis (Degrees) 83    R Axis (Degrees) -36    T Axis (Degrees) 113    REPORT TEXT      Sinus rhythm  with 1st degree AV block  Left axis deviation  Left bundle branch block  Abnormal ECG  When compared with ECG of  15-MAR-2022 22:49,  fusion complexes  are no longer  present  Confirmed by ROXIE RG MD ([id number]) on 3/16/2022 5:25:44 PM         ECG personally reviewed:       LAST ECHO/ECHO STRESS:  No valid procedures specified.      Assessment and Plan   87 year old male with past medical history significant for Parkinson disease, status post AICD placement, the patient was brought in due to decreased mental status as well as poor p.o. intake the patient on presentation noted to be hypoxic requiring  nonrebreather and a trial of CPAP is lethargic unable to give adequate history, on further evaluation noted to have leukocytosis as well as dehydration in addition to low blood pressure resuscitated with fluids and noted to have kidney failure complicated by acidosis and hyperkalemia, started on IV antibiotics for possible lower tract infection,      #1 acute metabolic encephalopathy etiology rule out underlying sepsis, dehydration gentle hydration already treated with antibiotics blood cultures urine cultures so far blood cultures negative      #2  Parkinson's disease progressive decline in activities of daily living probably worsened with sepsis of unknown source.  Continue symptomatic management with fluids, antibiotics, reevaluate.  DC antibiotics no source of infection     #3 leukocytosis source of infection to be determined panculture blood cultures, urine cultures, chest x-ray empiric treatment with Rocephin continue antibiotics today if cultures are negative will DC antibiotics        #4 Parkinson's disease resume medications patient has been noncompliant due to mental status changes and dementia resume        #5 anemia of chronic disease 7.5 asymptomatic continue to observe repeat  IV iron        #6 hyperkalemia etiology probably multifactorial medications, poor dehydration Kayexalate was given in the emergency room  Patient resolved      #7 fecal stasis mag citrate ordered good result with the large bowel movement     #8 lactic acidosis present on admit trend    #9 Parkinson's disease with advanced dementia clinic      #10 coronary artery disease has a defibrillator as well last ejection fraction of 41% with bioprosthetic aortic valve    DVT prophylaxis:. SCD.   Current Active Medications for DVT Prophylaxis (From admission, onward)         Stop     heparin (porcine) injection 5,000 Units  5,000 Units,   Subcutaneous,   3 times per day         --                 Code status:  Code Status Information      Code Status    Full Resuscitation          Disposition: pending clinical course    PCP: Parminder Bishop MD    Discussed plan of care with nurse, patient and family.     Communication :The plan was discussed with patient who expressed clear verbal understanding and agreement. All questions answered.    Time     Total face to face time spent with patient 35 minutes. Greater than 50% of the total time was spent counseling and/or coordinating care.         Prognosis guarded family is very much aware of his prognosis  And wished to have comfort care at home.    Will have palliative care again evaluate if family will agree to hospice care and comfort care    BMP and CBC in a.m.     for discharge planning.  Not dependent on activity of daily living.    Time spent in nonface-to-face discussion about discharge planning and advanced directives in regards to hospice evaluation more than 35 minutes prolonged care    I had a non  face-to-face evaluation with the patient daughter of  conchis Seay  to determine medical necessity for semielectric hospital bed patient has history of parkinsons disease, dementia and requires 24-hour care.  The patient requires positioning of the body in the ways not feasible with ordinary bed due to his severe Parkinson's disease.  Patient also requires frequent any major changes in body position and therefore a semielectric bed is medically necessary.  Patient needs Amrita lift and hospital bed.    Discussed with the family  Again   At   Length regards   To    deactivatig the defibrillator.  Discussed with his daughter at length Fabiola Aguiar [phone] that patient will be following up with palliative care and consider hospice.  Patient is active with [facility] palliative care and the daughter will reach out to them and schedule an meeting in that regards.   will try remove the Ryder catheter in the a.m if patient unable to void will have to reinsert the Ryder catheter before discharging  him.  Family is agreeable to that        Discussed with the daughter       Bev Siegel MD [facility] Hospitalist  3/18/2022 8:58 AM       general questions without assessment/multiparous mom

## 2022-04-07 ENCOUNTER — APPOINTMENT (OUTPATIENT)
Dept: DERMATOLOGY | Facility: CLINIC | Age: 32
End: 2022-04-07
Payer: COMMERCIAL

## 2022-04-07 ENCOUNTER — APPOINTMENT (OUTPATIENT)
Dept: DERMATOLOGY | Facility: CLINIC | Age: 32
End: 2022-04-07

## 2022-04-07 DIAGNOSIS — L53.9 ERYTHEMATOUS CONDITION, UNSPECIFIED: ICD-10-CM

## 2022-04-07 DIAGNOSIS — Z86.018 PERSONAL HISTORY OF OTHER BENIGN NEOPLASM: ICD-10-CM

## 2022-04-07 DIAGNOSIS — L91.0 HYPERTROPHIC SCAR: ICD-10-CM

## 2022-04-07 PROCEDURE — 99213 OFFICE O/P EST LOW 20 MIN: CPT | Mod: 25

## 2022-04-07 PROCEDURE — 17110 DESTRUCTION B9 LES UP TO 14: CPT

## 2022-04-07 PROCEDURE — 17106 DSTR CUT VSC PRLF LES<10SQCM: CPT

## 2022-04-27 NOTE — DISCHARGE NOTE ADULT - MEDICATION SUMMARY - MEDICATIONS TO CHANGE
Reason for Disposition  • Caller requesting an appointment, triage offered and declined    Protocols used: PCP CALL - NO TRIAGE-A-     I will SWITCH the dose or number of times a day I take the medications listed below when I get home from the hospital:  None

## 2022-05-05 ENCOUNTER — APPOINTMENT (OUTPATIENT)
Dept: DERMATOLOGY | Facility: CLINIC | Age: 32
End: 2022-05-05

## 2022-06-04 ENCOUNTER — NON-APPOINTMENT (OUTPATIENT)
Age: 32
End: 2022-06-04

## 2022-06-15 PROBLEM — D48.5 NEOPLASM OF UNCERTAIN BEHAVIOR OF SKIN: Status: ACTIVE | Noted: 2022-03-07

## 2022-06-15 PROBLEM — D23.9 DERMATOFIBROMA: Status: ACTIVE | Noted: 2020-10-22

## 2022-06-16 ENCOUNTER — APPOINTMENT (OUTPATIENT)
Dept: DERMATOLOGY | Facility: CLINIC | Age: 32
End: 2022-06-16
Payer: COMMERCIAL

## 2022-06-16 DIAGNOSIS — D48.5 NEOPLASM OF UNCERTAIN BEHAVIOR OF SKIN: ICD-10-CM

## 2022-06-16 DIAGNOSIS — D23.9 OTHER BENIGN NEOPLASM OF SKIN, UNSPECIFIED: ICD-10-CM

## 2022-06-16 PROCEDURE — 99213 OFFICE O/P EST LOW 20 MIN: CPT | Mod: 57

## 2022-06-16 PROCEDURE — 17106 DSTR CUT VSC PRLF LES<10SQCM: CPT

## 2022-06-16 NOTE — HISTORY OF PRESENT ILLNESS
[FreeTextEntry1] : f/u pt PDL [de-identified] : PEARL ARTHUR is a 32 year old F here for:\par \par # f/u scar from hemangioma. Here for PDL. Was treated with PDL and ILK at last visit with good results. Scar flattened with ILK, patient is pleased with the results.\par \par also with scar on left wrist.

## 2022-06-16 NOTE — ASSESSMENT
[FreeTextEntry1] : NUB; Scar\par s/p hemangioma\par education\par s/p 10 sessions PDL\par \par Risks and benefits of Pulsed Dye Laser Therapy were discussed including bruising, swelling, discoloration, lack of response, and need for multiple treatments;\par \par Pulsed Dye Laser :  Handpiece, 10 mm\par 5 Joules\par 1.5 msec;\par \par Site- right upper arm and left wrist\par \par

## 2022-06-16 NOTE — PHYSICAL EXAM
[Alert] : alert [Oriented x 3] : ~L oriented x 3 [Well Nourished] : well nourished [Conjunctiva Non-injected] : conjunctiva non-injected [No Visual Lymphadenopathy] : no visual  lymphadenopathy [No Clubbing] : no clubbing [No Edema] : no edema [No Bromhidrosis] : no bromhidrosis [No Chromhidrosis] : no chromhidrosis [Full Body Skin Exam Performed] : performed [FreeTextEntry3] : rounded scar with erythema on lateral border- improved from last visit, flattened \par scar from DF on left wrist

## 2022-06-22 PROBLEM — Z86.018 HISTORY OF HEMANGIOMA OF SKIN: Status: RESOLVED | Noted: 2020-11-19 | Resolved: 2022-06-22

## 2022-06-22 NOTE — HISTORY OF PRESENT ILLNESS
[FreeTextEntry1] : f/u pt PDL [de-identified] : PEARL ARTHUR is a 32 year old F here for:\par \par # f/u hemangioma. Here for PDL. Was treated with PDL and ILK at last visit with good results. Scar flattened with ILK, patient is pleased with the results.\par \par also with scar on left wrist.

## 2022-06-22 NOTE — ASSESSMENT
[FreeTextEntry1] : NUB; Scar\par s/p hemangioma\par education\par s/p 10 sessions PDL\par \par ND-yAG today for erythema

## 2022-07-07 NOTE — OB RN TRIAGE NOTE - TEMPERATURE IN FAHRENHEIT (DEGREES F)
Nenita Poole is calling for a refill of the following medication:      cholestyramine (QUESTRAN) 4 g packet 90 each 0 3/31/2022     Sig: MIX AND DRINK 1 PACKET BY MOUTH DAILY    Sent to pharmacy as: Cholestyramine 4 GM Oral Packet (Toya Denisse)    Class: Eprescribe    Notes to Pharmacy: Needs appointment for further refills        Pharmacy has been confirmed by patient:    [address], [address] [place] [address]   Phone:  [phone] Â Fax:  [phone]   GARO #:  [id number]    I did schedule a patient an appointment for the first available with [facility]. It is 09/06/22. She is asking for a bridge prescription called in if possible.      Thank you
97.9

## 2022-07-22 ENCOUNTER — RESULT REVIEW (OUTPATIENT)
Age: 32
End: 2022-07-22

## 2022-08-11 ENCOUNTER — APPOINTMENT (OUTPATIENT)
Dept: DERMATOLOGY | Facility: CLINIC | Age: 32
End: 2022-08-11

## 2022-08-11 DIAGNOSIS — L70.0 ACNE VULGARIS: ICD-10-CM

## 2022-08-11 DIAGNOSIS — L90.5 SCAR CONDITIONS AND FIBROSIS OF SKIN: ICD-10-CM

## 2022-08-11 PROCEDURE — 99213 OFFICE O/P EST LOW 20 MIN: CPT | Mod: 25

## 2022-08-11 PROCEDURE — 17106 DSTR CUT VSC PRLF LES<10SQCM: CPT | Mod: 58

## 2022-08-11 RX ORDER — TRETINOIN 0.25 MG/G
0.03 CREAM TOPICAL
Qty: 1 | Refills: 11 | Status: ACTIVE | COMMUNITY
Start: 2021-06-01

## 2022-08-11 NOTE — PHYSICAL EXAM
[Alert] : alert [Well Nourished] : well nourished [Conjunctiva Non-injected] : conjunctiva non-injected [No Visual Lymphadenopathy] : no visual  lymphadenopathy [No Clubbing] : no clubbing [No Edema] : no edema [No Bromhidrosis] : no bromhidrosis [No Chromhidrosis] : no chromhidrosis [FreeTextEntry3] : rounded scar with erythema on lateral border- improved from last visit, flattened \par scar from DF on left wrist\par hyperpigmented macules on cheeks

## 2022-08-11 NOTE — HISTORY OF PRESENT ILLNESS
[FreeTextEntry1] : f/u pt PDL [de-identified] : PEARL ARTHUR is a 32 year old F here for:\par \par # f/u scar from hemangioma. Here for PDL. Was treated with PDL and ILK at previous visit with good results. Treated with PDL at last visit.\par \par Also with dark marks on face

## 2022-08-11 NOTE — ASSESSMENT
[FreeTextEntry1] : 1) NUB; Scar\par s/p hemangioma\par education\par s/p 11 sessions PDL\par \par Risks and benefits of Pulsed Dye Laser Therapy were discussed including bruising, swelling, discoloration, lack of response, and need for multiple treatments;\par \par Pulsed Dye Laser :  Handpiece, 7 mm\par 7.75 Joules\par 1.5 msec;\par \par Site- right upper arm and left wrist\par \par 2) PIH, chronic, flaring\par - in the setting of acne\par - START tretinoin 0.025%--apply a pea sized amount-may start 2-3x/week and increase to nightly as tolerated, SED. moisturize afterwards and apply sunscreen daily\par \par

## 2022-10-20 ENCOUNTER — APPOINTMENT (OUTPATIENT)
Dept: DERMATOLOGY | Facility: CLINIC | Age: 32
End: 2022-10-20
Payer: COMMERCIAL

## 2022-10-20 DIAGNOSIS — L30.1 DYSHIDROSIS [POMPHOLYX]: ICD-10-CM

## 2022-10-20 DIAGNOSIS — L29.9 PRURITUS, UNSPECIFIED: ICD-10-CM

## 2022-10-20 DIAGNOSIS — D18.01 HEMANGIOMA OF SKIN AND SUBCUTANEOUS TISSUE: ICD-10-CM

## 2022-10-20 PROCEDURE — 17106 DSTR CUT VSC PRLF LES<10SQCM: CPT | Mod: 79

## 2022-10-20 PROCEDURE — 99213 OFFICE O/P EST LOW 20 MIN: CPT | Mod: 24,25

## 2022-10-20 NOTE — HISTORY OF PRESENT ILLNESS
[FreeTextEntry1] : f/u pt PDL [de-identified] : PEARL ARTHUR is a 32 year old F here for:\par \par # F/u scar from hemangioma. Here for PDL. Was treated with PDL and ILK at previous visit with good results. Treated with PDL at last visit.\par \par Also with dark marks on face

## 2022-10-20 NOTE — ASSESSMENT
[FreeTextEntry1] : 1) hemangioma, NUB; Scar\par s/p hemangioma\par education\par s/p 12 sessions PDL\par \par Risks and benefits of Pulsed Dye Laser Therapy were discussed including bruising, swelling, discoloration, lack of response, and need for multiple treatments;\par \par Pulsed Dye Laser :  Handpiece, 7 mm\par 7.00 Joules\par 1.5 msec;\par \par Site- right upper arm and left wrist\par \par 2) PIH, chronic, stable \par - in the setting of acne\par - Continue tretinoin 0.025%--apply a pea sized amount-may start 2-3x/week and increase to nightly as tolerated, SED. moisturize afterwards and apply sunscreen daily\par \par 3) dyshydrotic derm\par -education\par -gentle skin care reviewed\par -c/w topicals\par

## 2023-03-24 ENCOUNTER — TRANSCRIPTION ENCOUNTER (OUTPATIENT)
Age: 33
End: 2023-03-24

## 2023-04-25 NOTE — OB RN PATIENT PROFILE - BLOOD TRANSFUSION, PREVIOUS, PROFILE
"  Patient placed on Bipap for increased work of breathing and increasing oxygen demands. Xopenex neb x1 given through Bipap. BS diminished pre and post treatment with inceased aeration post treatment. Bipap settings Ipap 10, EPAP 5, RR14, 90%O2.     Patient became restless on Bipap and attempting to remove mask. Bipap was taken off patient was then placed on oxymask. Paitent did not tolerate oxymask, due to decreasing oxygen saturations.     Patient placed on HFNC, settings 45 lpm, 65%O2. Patient is currently tolerating settings stated above.     /62   Pulse 114   Temp 99.3  F (37.4  C) (Oral)   Resp 18   Ht 1.702 m (5' 7\")   Wt 88 kg (194 lb)   SpO2 95%   BMI 30.38 kg/m      Shannon Caldera, RT    " no

## 2023-05-03 NOTE — PRE-ANESTHESIA EVALUATION ADULT - NSANTHBPHIGHRD_ENT_A_CORE
[Nutrition/ Exercise/ Weight Management] : nutrition, exercise, weight management [Vitamins/Supplements] : vitamins/supplements [Breast Self Exam] : breast self exam No

## 2023-08-16 ENCOUNTER — OUTPATIENT (OUTPATIENT)
Dept: OUTPATIENT SERVICES | Facility: HOSPITAL | Age: 33
LOS: 1 days | End: 2023-08-16
Payer: COMMERCIAL

## 2023-08-16 ENCOUNTER — APPOINTMENT (OUTPATIENT)
Dept: ULTRASOUND IMAGING | Facility: CLINIC | Age: 33
End: 2023-08-16
Payer: COMMERCIAL

## 2023-08-16 DIAGNOSIS — Z98.890 OTHER SPECIFIED POSTPROCEDURAL STATES: Chronic | ICD-10-CM

## 2023-08-16 DIAGNOSIS — Z87.2 PERSONAL HISTORY OF DISEASES OF THE SKIN AND SUBCUTANEOUS TISSUE: Chronic | ICD-10-CM

## 2023-08-16 DIAGNOSIS — N83.209 UNSPECIFIED OVARIAN CYST, UNSPECIFIED SIDE: Chronic | ICD-10-CM

## 2023-08-16 DIAGNOSIS — Z00.00 ENCOUNTER FOR GENERAL ADULT MEDICAL EXAMINATION WITHOUT ABNORMAL FINDINGS: ICD-10-CM

## 2023-08-16 DIAGNOSIS — N92.4 EXCESSIVE BLEEDING IN THE PREMENOPAUSAL PERIOD: ICD-10-CM

## 2023-08-16 PROCEDURE — 76830 TRANSVAGINAL US NON-OB: CPT | Mod: 26

## 2023-08-16 PROCEDURE — 76856 US EXAM PELVIC COMPLETE: CPT

## 2023-08-16 PROCEDURE — 76830 TRANSVAGINAL US NON-OB: CPT

## 2023-08-16 PROCEDURE — 76856 US EXAM PELVIC COMPLETE: CPT | Mod: 26

## 2023-10-11 NOTE — DISCHARGE NOTE OB - CRACKED, BLEEDING NIPPLES
PAST SURGICAL HISTORY:  S/P left knee surgery 2x  first was 45 years ago and recent was 20 years ago    S/P total right hip arthroplasty      Statement Selected

## 2024-01-06 ENCOUNTER — NON-APPOINTMENT (OUTPATIENT)
Age: 34
End: 2024-01-06

## 2024-01-17 ENCOUNTER — APPOINTMENT (OUTPATIENT)
Dept: DERMATOLOGY | Facility: CLINIC | Age: 34
End: 2024-01-17

## 2024-03-04 ENCOUNTER — TRANSCRIPTION ENCOUNTER (OUTPATIENT)
Age: 34
End: 2024-03-04

## 2024-04-04 ENCOUNTER — APPOINTMENT (OUTPATIENT)
Dept: ULTRASOUND IMAGING | Facility: CLINIC | Age: 34
End: 2024-04-04
Payer: COMMERCIAL

## 2024-04-04 ENCOUNTER — OUTPATIENT (OUTPATIENT)
Dept: OUTPATIENT SERVICES | Facility: HOSPITAL | Age: 34
LOS: 1 days | End: 2024-04-04
Payer: COMMERCIAL

## 2024-04-04 DIAGNOSIS — Z98.890 OTHER SPECIFIED POSTPROCEDURAL STATES: Chronic | ICD-10-CM

## 2024-04-04 DIAGNOSIS — Z00.8 ENCOUNTER FOR OTHER GENERAL EXAMINATION: ICD-10-CM

## 2024-04-04 DIAGNOSIS — Z87.2 PERSONAL HISTORY OF DISEASES OF THE SKIN AND SUBCUTANEOUS TISSUE: Chronic | ICD-10-CM

## 2024-04-04 DIAGNOSIS — N83.209 UNSPECIFIED OVARIAN CYST, UNSPECIFIED SIDE: Chronic | ICD-10-CM

## 2024-04-04 PROCEDURE — 76536 US EXAM OF HEAD AND NECK: CPT

## 2024-04-04 PROCEDURE — 76536 US EXAM OF HEAD AND NECK: CPT | Mod: 26

## 2024-04-26 ENCOUNTER — APPOINTMENT (OUTPATIENT)
Dept: ENDOCRINOLOGY | Facility: CLINIC | Age: 34
End: 2024-04-26
Payer: COMMERCIAL

## 2024-04-26 VITALS
HEIGHT: 64 IN | SYSTOLIC BLOOD PRESSURE: 134 MMHG | WEIGHT: 130 LBS | DIASTOLIC BLOOD PRESSURE: 74 MMHG | BODY MASS INDEX: 22.2 KG/M2 | HEART RATE: 84 BPM | OXYGEN SATURATION: 98 %

## 2024-04-26 DIAGNOSIS — R53.83 OTHER FATIGUE: ICD-10-CM

## 2024-04-26 PROCEDURE — G2211 COMPLEX E/M VISIT ADD ON: CPT

## 2024-04-26 PROCEDURE — 99204 OFFICE O/P NEW MOD 45 MIN: CPT

## 2024-04-26 NOTE — ASSESSMENT
[FreeTextEntry1] : 1. Thyroid Nodule  2.  Fatigue Thyroid US 4/4/24: RMP 5.2 cm mixed cystic/isoechoic TR 2 nodule, LLP 6 mm cyst. RECOMMENDATIONS: -Given size of nodule, mild compressive symptoms, we discussed cyst aspiration. -Can plan for cyst aspiration of RMP 5.2 cm nodule, with FNA of the solid portions -Check TSH, free T4, thyroid antibodies given multiple nonspecific symptoms -Recommend checking TFTs again during pregnancy  RTC for FNA.  Lilly Grubbs MD [facility] at [address], [address] Ph: [phone] Fax: [phone]

## 2024-04-26 NOTE — REVIEW OF SYSTEMS
[TextEntry] : REVIEW OF SYSTEMS: General: + fatigue Respiratory: No cough, no shortness of breath  Cardiovascular: + palpitations, no leg swelling  Gastrointestinal: + constipation, no diarrhea  Integumentary: No rash, +skin/hair thinning  Neurologic: No tremors Psychiatric: The patient is not currently nervous or anxious  Endocrine: + thyroid/neck swelling, +cold intolerance   The review of systems is otherwise negative except as noted in HPI.

## 2024-04-26 NOTE — HISTORY OF PRESENT ILLNESS
[FreeTextEntry1] : CHIEF COMPLAINT: Thyroid nodule REFERRED BY: PCP   HISTORY OF PRESENTING ILLNESS: The patient is a 34-year-old female being seen in the office today for evaluation of thyroid nodule.  She reports that her mother noticed that she has a neck swelling around 2023, she reported this to her PCP in 3/2024 who felt a thyroid enlargement. Thyroid US 24: RMP 5.2 cm mixed cystic/isoechoic TR 2 nodule, LLP 6 mm cyst. She has never had an FNA. Reports that she feels the neck swelling, but does not have dysphagia, dyspnea on lying flat, change in voice.  Notices that she has recently started snoring during sleep. Reports multiple female family members with thyroid disease, history of thyroid surgery.  No history of thyroid cancer known to her. No history of radiation exposure to the neck. She has never been on thyroid medications. TSH has always been normal/low normal, especially during pregnancy is slightly low.   She endorses fatigue, hair loss, occasional palpitations, cold intolerance, constipation.  Denies tremors, denies lower extremity swelling.   Reproductive history: First pregnancy at age 17: Elective  Second pregnancy, resulted in live birth 2019. Third pregnancy, resulted in live birth 10/2021. Both pregnancies were natural conception. Reports that recently she had heavy clots and bleeding in 2023, which could have been a miscarriage. She is actively looking to get pregnant again, has been trying for the last 6 months.  Home medications: Prenatal vitamins, no other medications.

## 2024-04-26 NOTE — PHYSICAL EXAM
[TextEntry] : PHYSICAL EXAMINATION: Vital signs from today's encounter reviewed.  GENERAL: No acute distress, clinically eukinetic, normal appearance HEAD: Normocephalic, atraumatic EYES: conjunctivae are pink and moist, no icterus, no proptosis  NECK: Palpable thyroid fullness, especially on the right side, non-tender, no adenopathy CARDIOVASCULAR: well-perfused extremities, no peripheral edema RESPIRATORY: normal chest expansion with good pulmonary effort, no acute respiratory distress MUSCULOSKELETAL: no swelling, normal range of motion, normal gait SKIN: no pallor, no icterus, no rash  NEUROLOGIC: alert and oriented, no evident focal deficits, no tremors  PSYCHIATRIC: mood and affect are normal

## 2024-05-01 ENCOUNTER — APPOINTMENT (OUTPATIENT)
Dept: ENDOCRINOLOGY | Facility: CLINIC | Age: 34
End: 2024-05-01
Payer: COMMERCIAL

## 2024-05-01 VITALS
WEIGHT: 130 LBS | BODY MASS INDEX: 22.2 KG/M2 | DIASTOLIC BLOOD PRESSURE: 80 MMHG | SYSTOLIC BLOOD PRESSURE: 112 MMHG | HEART RATE: 85 BPM | HEIGHT: 64 IN | OXYGEN SATURATION: 99 %

## 2024-05-01 LAB
T4 FREE SERPL-MCNC: 1.4 NG/DL
THYROGLOB AB SERPL-ACNC: <20 IU/ML
THYROPEROXIDASE AB SERPL IA-ACNC: 19.6 IU/ML
TSH SERPL-ACNC: 0.95 UIU/ML

## 2024-05-01 PROCEDURE — 10005 FNA BX W/US GDN 1ST LES: CPT

## 2024-05-01 NOTE — ASSESSMENT
[FreeTextEntry1] : 1. Thyroid Nodule/Multinodular goiter   Dominant nodule RMP 5.2 cm cyst    - FNA performed in office today - Sample was saved for ThyroSeqv3 - Patient tolerated procedure well, provided post-procedural instructions: avoid heavy lifting today, can use ice pack or OTC pain medications (acetaminophen or ibuprofen) if needed. Call back in case of any swelling increasing in size, or severe pain at the site. - Will follow up cytology results and formulate further plan with patient  Lilly Grubbs MD [facility] at [address], [address] Ph: [phone] Fax: [phone]

## 2024-05-01 NOTE — PROCEDURE
[Fine Needle Aspiration] : Fine needle aspiration ~T ~C was performed. [Area of Mass: ______] : mass identified in the [unfilled] [Risks] : risks [Benefits] : benefits [Patient] : the patient [Lidocaine Cream] : lidocaine cream [Supine] : The patient was placed in the supine position with the neck extended as tolerated. [Alcohol] : with alcohol [25 gauge 1.5 inch] : A 25 gauge 1.5 inch needle was used [Ultrasonic Guidance] : ultrasound guidance was employed [Sent to Histology] : The specimens were prepared in the usual manner and sent to histology. [Thyroseq] : Thyroseq [Tolerated Well] : the patient tolerated the procedure well [Hemostasis] : hemostasis was assured and the patient was discharged in satisfactory condition [No Complications] : There were no complications [Instructions Given] : Handouts/patient instructions were given to patient [3 Passes] : 3 passes were made through the mass [de-identified] : RMP 5.2 cm cyst aspiration  [de-identified] : Cyst aspiration with 21G  [de-identified] : 1 pass 21G, 1 pass 20G, 3 passes 25G

## 2024-05-05 LAB — FNA, THYROID: NORMAL

## 2024-05-29 ENCOUNTER — APPOINTMENT (OUTPATIENT)
Dept: HUMAN REPRODUCTION | Facility: CLINIC | Age: 34
End: 2024-05-29
Payer: COMMERCIAL

## 2024-05-29 PROCEDURE — 99459 PELVIC EXAMINATION: CPT

## 2024-05-29 PROCEDURE — 76830 TRANSVAGINAL US NON-OB: CPT

## 2024-05-29 PROCEDURE — 36415 COLL VENOUS BLD VENIPUNCTURE: CPT

## 2024-05-29 PROCEDURE — 99205 OFFICE O/P NEW HI 60 MIN: CPT | Mod: 25

## 2024-06-03 ENCOUNTER — APPOINTMENT (OUTPATIENT)
Dept: HUMAN REPRODUCTION | Facility: CLINIC | Age: 34
End: 2024-06-03
Payer: COMMERCIAL

## 2024-06-03 PROCEDURE — 99214 OFFICE O/P EST MOD 30 MIN: CPT | Mod: 25

## 2024-06-03 PROCEDURE — 58999I: CUSTOM

## 2024-06-03 PROCEDURE — 99459 PELVIC EXAMINATION: CPT

## 2024-06-03 PROCEDURE — 58340 CATHETER FOR HYSTEROGRAPHY: CPT

## 2024-06-03 PROCEDURE — 76831 ECHO EXAM UTERUS: CPT

## 2024-06-03 PROCEDURE — 74740 X-RAY FEMALE GENITAL TRACT: CPT

## 2024-06-05 ENCOUNTER — APPOINTMENT (OUTPATIENT)
Dept: ENDOCRINOLOGY | Facility: CLINIC | Age: 34
End: 2024-06-05
Payer: COMMERCIAL

## 2024-06-05 VITALS
WEIGHT: 131 LBS | HEART RATE: 92 BPM | HEIGHT: 64 IN | SYSTOLIC BLOOD PRESSURE: 130 MMHG | BODY MASS INDEX: 22.36 KG/M2 | DIASTOLIC BLOOD PRESSURE: 80 MMHG | OXYGEN SATURATION: 98 %

## 2024-06-05 DIAGNOSIS — E04.1 NONTOXIC SINGLE THYROID NODULE: ICD-10-CM

## 2024-06-05 PROCEDURE — 10005 FNA BX W/US GDN 1ST LES: CPT

## 2024-06-05 PROCEDURE — 99214 OFFICE O/P EST MOD 30 MIN: CPT

## 2024-06-05 NOTE — REVIEW OF SYSTEMS
[TextEntry] : REVIEW OF SYSTEMS: General: + fatigue Respiratory: No cough, + shortness of breath  Integumentary: No rash, +skin/hair thinning  Neurologic: No tremors Psychiatric: The patient is not currently nervous or anxious  Endocrine: + thyroid/neck swelling, +cold intolerance   The review of systems is otherwise negative except as noted in HPI.

## 2024-06-05 NOTE — PHYSICAL EXAM
[TextEntry] : PHYSICAL EXAMINATION: Vital signs from today's encounter reviewed.  GENERAL: No acute distress, clinically eukinetic, normal appearance HEAD: Normocephalic, atraumatic EYES: conjunctivae are pink and moist, no icterus, no proptosis  NECK: Palpable thyroid fullness, especially on the right side, non-tender, no adenopathy RESPIRATORY: normal chest expansion with good pulmonary effort, no acute respiratory distress NEUROLOGIC: alert and oriented, no evident focal deficits, no tremors  PSYCHIATRIC: mood and affect are normal

## 2024-06-05 NOTE — HISTORY OF PRESENT ILLNESS
[FreeTextEntry1] : CHIEF COMPLAINT: Thyroid nodule/thyroid cyst   HISTORY OF PRESENTING ILLNESS: The patient is a 34-year-old female being seen in the office today for evaluation of thyroid nodule.  She reports that her mother noticed that she has a neck swelling around 2023, she reported this to her PCP in 3/2024 who felt a thyroid enlargement. Thyroid US 24: RMP 5.2 cm mixed cystic/isoechoic TR 2 nodule, LLP 6 mm cyst. FNA of RMP dominant nodule benign/[place] 2 on 24. Normal TFTs/negative thyroid antibodies 2024.  Reports that she feels the neck swelling, endorses some dysphagia and some dyspnea, can feel the nodule weighing on her when she is breathing.  Notices that she has recently started snoring during sleep. Reports multiple female family members with thyroid disease, history of thyroid surgery.  No history of thyroid cancer known to her. No history of radiation exposure to the neck. She has never been on thyroid medications. She endorses fatigue, hair loss, occasional palpitations, cold intolerance, constipation.  Denies tremors, denies lower extremity swelling.   Reproductive history: First pregnancy at age 17: Elective  Second pregnancy, resulted in live birth 2019. Third pregnancy, resulted in live birth 10/2021. Both pregnancies were natural conception. Reports that recently she had heavy clots and bleeding in 2023, which could have been a miscarriage. She is actively looking to get pregnant again, has been trying for the last 6 months.  Home medications: Prenatal vitamins, no other medications.

## 2024-06-05 NOTE — ASSESSMENT
[FreeTextEntry1] : 1. Thyroid Nodule  2.  Fatigue Thyroid US 4/4/24: RMP 5.2 cm mixed cystic/isoechoic TR 2 nodule, LLP 6 mm cyst. FNA benign/[place] 2 of right dominant nodule 5/1/24 RECOMMENDATIONS: -Cyst aspiration of RMP 5.2 cm nodule performed today with removal of 34 cc of red/brown thick fluid.  Size of nodule after cyst aspiration was 2.9 cm x 1.3 cm x 1.4 cm (mostly solid portion), size reduction of about 88.5% in volume.  The patient feels better in terms of swallowing and breathing after cyst aspiration, size of thyroid nodule is visibly reduced. -We discussed that sometimes cyst aspiration alone is sufficient for management of thyroid cysts.  In case of fluid recollection, we can consider repeat aspiration with ethanol ablation. -Repeat thyroid ultrasound in 6 months, prior to next appointment. -Recommend checking TFTs again during pregnancy, otherwise annually.  RTC in 6 months with previsit ultrasound.  Lilly Grubbs MD [facility] at [address], [address] Ph: [phone] Fax: [phone]

## 2024-06-05 NOTE — HISTORY OF PRESENT ILLNESS
[FreeTextEntry1] : CHIEF COMPLAINT: Thyroid nodule/thyroid cyst   HISTORY OF PRESENTING ILLNESS: The patient is a 34-year-old female being seen in the office today for evaluation of thyroid nodule.  She reports that her mother noticed that she has a neck swelling around 2023, she reported this to her PCP in 3/2024 who felt a thyroid enlargement. Thyroid US 24: RMP 5.2 cm mixed cystic/isoechoic TR 2 nodule, LLP 6 mm cyst. FNA of RMP dominant nodule benign/[place] 2 on 24. Normal TFTs/negative thyroid antibodies 2024.  Reports that she feels the neck swelling, endorses some dysphagia and some dyspnea, can feel the nodule weighing on her when she is breathing.  Notices that she has recently started snoring during sleep. Reports multiple female family members with thyroid disease, history of thyroid surgery.  No history of thyroid cancer known to her. No history of radiation exposure to the neck. She has never been on thyroid medications. She endorses fatigue, hair loss, occasional palpitations, cold intolerance, constipation.  Denies tremors, denies lower extremity swelling.   Reproductive history: First pregnancy at age 17: Elective  Second pregnancy, resulted in live birth 2019. Third pregnancy, resulted in live birth 10/2021. Both pregnancies were natural conception. Reports that recently she had heavy clots and bleeding in 2023, which could have been a miscarriage. She is actively looking to get pregnant again, has been trying for the last 6 months.  Home medications: Prenatal vitamins, no other medications.

## 2024-06-05 NOTE — PROCEDURE
[Fine Needle Aspiration] : Fine needle aspiration ~T ~C was performed. [Area of Mass: ______] : mass identified in the [unfilled] [Risks] : risks [Benefits] : benefits [Patient] : the patient [Lidocaine Cream] : lidocaine cream [___ ml Inj] : ~l [1%] : 1% [Supine] : The patient was placed in the supine position with the neck extended as tolerated. [Alcohol] : with alcohol [1 Pass] : 1 pass was made through the mass [Ultrasonic Guidance] : ultrasound guidance was employed [Sent to Histology] : The specimens were prepared in the usual manner and sent to histology. [Tolerated Well] : the patient tolerated the procedure well [Hemostasis] : hemostasis was assured and the patient was discharged in satisfactory condition [No Complications] : There were no complications [Instructions Given] : Handouts/patient instructions were given to patient [de-identified] : 18G needle

## 2024-06-05 NOTE — PROCEDURE
[Fine Needle Aspiration] : Fine needle aspiration ~T ~C was performed. [Area of Mass: ______] : mass identified in the [unfilled] [Risks] : risks [Benefits] : benefits [Patient] : the patient [Lidocaine Cream] : lidocaine cream [___ ml Inj] : ~l [1%] : 1% [Supine] : The patient was placed in the supine position with the neck extended as tolerated. [Alcohol] : with alcohol [1 Pass] : 1 pass was made through the mass [Ultrasonic Guidance] : ultrasound guidance was employed [Sent to Histology] : The specimens were prepared in the usual manner and sent to histology. [Tolerated Well] : the patient tolerated the procedure well [Hemostasis] : hemostasis was assured and the patient was discharged in satisfactory condition [No Complications] : There were no complications [Instructions Given] : Handouts/patient instructions were given to patient [de-identified] : 18G needle

## 2024-06-05 NOTE — REASON FOR VISIT
[Procedure: _________] : a [unfilled] procedure visit [Follow - Up] : a follow-up visit [Thyroid nodule/ MNG] : thyroid nodule/ MNG

## 2024-06-05 NOTE — ASSESSMENT
[FreeTextEntry1] : 1. Thyroid Nodule  2.  Fatigue Thyroid US 4/4/24: RMP 5.2 cm mixed cystic/isoechoic TR 2 nodule, LLP 6 mm cyst. FNA benign/[place] 2 of right dominant nodule 5/1/24 RECOMMENDATIONS: -Cyst aspiration of RMP 5.2 cm nodule performed today with removal of 34 cc of red/brown thick fluid.  Size of nodule after cyst aspiration was 2.9 cm x 1.3 cm x 1.4 cm (mostly solid portion), size reduction of about 88.5% in volume.  The patient feels better in terms of swallowing and breathing after cyst aspiration, size of thyroid nodule is visibly reduced. -We discussed that sometimes cyst aspiration alone is sufficient for management of thyroid cysts.  In case of fluid recollection, we can consider repeat aspiration with ethanol ablation. -Repeat thyroid ultrasound in 6 months, prior to next appointment. -Recommend checking TFTs again during pregnancy, otherwise annually.  RTC in 6 months with previsit ultrasound.  Lilly Grubbs MD [facility] at [address], [address] Ph: [phone] Fax: [phone]

## 2024-06-10 LAB — FNA, THYROID: NORMAL

## 2024-06-27 ENCOUNTER — APPOINTMENT (OUTPATIENT)
Dept: RADIOLOGY | Facility: HOSPITAL | Age: 34
End: 2024-06-27
Payer: COMMERCIAL

## 2024-06-27 ENCOUNTER — OUTPATIENT (OUTPATIENT)
Dept: OUTPATIENT SERVICES | Facility: HOSPITAL | Age: 34
LOS: 1 days | End: 2024-06-27
Payer: COMMERCIAL

## 2024-06-27 ENCOUNTER — RESULT REVIEW (OUTPATIENT)
Age: 34
End: 2024-06-27

## 2024-06-27 DIAGNOSIS — N97.9 FEMALE INFERTILITY, UNSPECIFIED: ICD-10-CM

## 2024-06-27 DIAGNOSIS — N83.209 UNSPECIFIED OVARIAN CYST, UNSPECIFIED SIDE: Chronic | ICD-10-CM

## 2024-06-27 DIAGNOSIS — Z98.890 OTHER SPECIFIED POSTPROCEDURAL STATES: Chronic | ICD-10-CM

## 2024-06-27 DIAGNOSIS — Z87.2 PERSONAL HISTORY OF DISEASES OF THE SKIN AND SUBCUTANEOUS TISSUE: Chronic | ICD-10-CM

## 2024-06-27 PROCEDURE — 58340 CATHETER FOR HYSTEROGRAPHY: CPT

## 2024-06-27 PROCEDURE — 74740 X-RAY FEMALE GENITAL TRACT: CPT

## 2024-06-27 PROCEDURE — 74740 X-RAY FEMALE GENITAL TRACT: CPT | Mod: 26

## 2024-07-18 ENCOUNTER — APPOINTMENT (OUTPATIENT)
Dept: HUMAN REPRODUCTION | Facility: CLINIC | Age: 34
End: 2024-07-18
Payer: COMMERCIAL

## 2024-07-18 PROCEDURE — 99215 OFFICE O/P EST HI 40 MIN: CPT

## 2024-08-25 ENCOUNTER — OUTPATIENT (OUTPATIENT)
Dept: OUTPATIENT SERVICES | Facility: HOSPITAL | Age: 34
LOS: 1 days | End: 2024-08-25
Payer: COMMERCIAL

## 2024-08-25 DIAGNOSIS — Z00.8 ENCOUNTER FOR OTHER GENERAL EXAMINATION: ICD-10-CM

## 2024-08-25 DIAGNOSIS — Z98.890 OTHER SPECIFIED POSTPROCEDURAL STATES: Chronic | ICD-10-CM

## 2024-08-25 DIAGNOSIS — N83.209 UNSPECIFIED OVARIAN CYST, UNSPECIFIED SIDE: Chronic | ICD-10-CM

## 2024-08-25 DIAGNOSIS — Z87.2 PERSONAL HISTORY OF DISEASES OF THE SKIN AND SUBCUTANEOUS TISSUE: Chronic | ICD-10-CM

## 2024-08-25 PROCEDURE — 76830 TRANSVAGINAL US NON-OB: CPT

## 2024-08-25 PROCEDURE — 76801 OB US < 14 WKS SINGLE FETUS: CPT

## 2024-09-19 ENCOUNTER — ASOB RESULT (OUTPATIENT)
Age: 34
End: 2024-09-19

## 2024-09-19 ENCOUNTER — APPOINTMENT (OUTPATIENT)
Dept: ANTEPARTUM | Facility: CLINIC | Age: 34
End: 2024-09-19
Payer: COMMERCIAL

## 2024-09-19 PROCEDURE — 76801 OB US < 14 WKS SINGLE FETUS: CPT

## 2024-09-19 PROCEDURE — 76813 OB US NUCHAL MEAS 1 GEST: CPT

## 2024-10-23 ENCOUNTER — APPOINTMENT (OUTPATIENT)
Dept: ULTRASOUND IMAGING | Facility: CLINIC | Age: 34
End: 2024-10-23
Payer: COMMERCIAL

## 2024-10-23 ENCOUNTER — OUTPATIENT (OUTPATIENT)
Dept: OUTPATIENT SERVICES | Facility: HOSPITAL | Age: 34
LOS: 1 days | End: 2024-10-23
Payer: COMMERCIAL

## 2024-10-23 DIAGNOSIS — Z98.890 OTHER SPECIFIED POSTPROCEDURAL STATES: Chronic | ICD-10-CM

## 2024-10-23 DIAGNOSIS — Z87.2 PERSONAL HISTORY OF DISEASES OF THE SKIN AND SUBCUTANEOUS TISSUE: Chronic | ICD-10-CM

## 2024-10-23 DIAGNOSIS — N83.209 UNSPECIFIED OVARIAN CYST, UNSPECIFIED SIDE: Chronic | ICD-10-CM

## 2024-10-23 DIAGNOSIS — Z00.8 ENCOUNTER FOR OTHER GENERAL EXAMINATION: ICD-10-CM

## 2024-10-23 PROCEDURE — 76536 US EXAM OF HEAD AND NECK: CPT | Mod: 26

## 2024-10-23 PROCEDURE — 76536 US EXAM OF HEAD AND NECK: CPT

## 2024-10-26 ENCOUNTER — TRANSCRIPTION ENCOUNTER (OUTPATIENT)
Age: 34
End: 2024-10-26

## 2024-11-08 ENCOUNTER — APPOINTMENT (OUTPATIENT)
Dept: ENDOCRINOLOGY | Facility: CLINIC | Age: 34
End: 2024-11-08
Payer: COMMERCIAL

## 2024-11-08 ENCOUNTER — NON-APPOINTMENT (OUTPATIENT)
Age: 34
End: 2024-11-08

## 2024-11-08 VITALS
BODY MASS INDEX: 23.9 KG/M2 | SYSTOLIC BLOOD PRESSURE: 124 MMHG | HEART RATE: 103 BPM | HEIGHT: 64 IN | DIASTOLIC BLOOD PRESSURE: 70 MMHG | OXYGEN SATURATION: 99 % | WEIGHT: 140 LBS

## 2024-11-08 DIAGNOSIS — E04.1 NONTOXIC SINGLE THYROID NODULE: ICD-10-CM

## 2024-11-08 DIAGNOSIS — E07.9 ENDOCRINE, NUTRITIONAL AND METABOLIC DISEASES COMPLICATING PREGNANCY, UNSPECIFIED TRIMESTER: ICD-10-CM

## 2024-11-08 DIAGNOSIS — O99.280 ENDOCRINE, NUTRITIONAL AND METABOLIC DISEASES COMPLICATING PREGNANCY, UNSPECIFIED TRIMESTER: ICD-10-CM

## 2024-11-08 PROCEDURE — 99214 OFFICE O/P EST MOD 30 MIN: CPT

## 2024-11-08 PROCEDURE — G2211 COMPLEX E/M VISIT ADD ON: CPT

## 2024-11-22 ENCOUNTER — APPOINTMENT (OUTPATIENT)
Dept: ANTEPARTUM | Facility: CLINIC | Age: 34
End: 2024-11-22
Payer: COMMERCIAL

## 2024-11-22 ENCOUNTER — ASOB RESULT (OUTPATIENT)
Age: 34
End: 2024-11-22

## 2024-11-22 PROCEDURE — 76811 OB US DETAILED SNGL FETUS: CPT

## 2024-12-27 NOTE — H&P PST ADULT - PSYCHIATRIC
Vascular Access Team    Evaluation for: Bedside DL SOLO PICC placement  Requested by name: Flor Rush (27-Dec-2024 13:38)    Indication for PICC: IV Chemotherapy  NKDA  Platelets(>20): 208  INR(<3): 1.00  eGFR(>40): 98  Blood cultures sent: no  Blood culture negative in 48hrs: n/a  Anticoagulants: no   Arms DVT: no   Mastectomy: no   Fistula: no   PPM/Defib: no   IR or Nephrology or ID clearance needed- no     Consent obtained: Yes    Plan: Bedside picc order evaluated. Please obtain PICC placement consent and then call [phone] for the VAT RN to place the bedside picc.   negative Affect and characteristics of appearance, verbalizations, behaviors are appropriate

## 2025-01-13 ENCOUNTER — APPOINTMENT (OUTPATIENT)
Dept: ANTEPARTUM | Facility: CLINIC | Age: 35
End: 2025-01-13
Payer: COMMERCIAL

## 2025-01-13 ENCOUNTER — ASOB RESULT (OUTPATIENT)
Age: 35
End: 2025-01-13

## 2025-01-13 PROCEDURE — 76816 OB US FOLLOW-UP PER FETUS: CPT

## 2025-01-13 PROCEDURE — 76819 FETAL BIOPHYS PROFIL W/O NST: CPT | Mod: 59

## 2025-02-13 ENCOUNTER — ASOB RESULT (OUTPATIENT)
Age: 35
End: 2025-02-13

## 2025-02-13 ENCOUNTER — APPOINTMENT (OUTPATIENT)
Dept: ANTEPARTUM | Facility: CLINIC | Age: 35
End: 2025-02-13
Payer: COMMERCIAL

## 2025-02-13 PROCEDURE — 76816 OB US FOLLOW-UP PER FETUS: CPT

## 2025-03-13 ENCOUNTER — ASOB RESULT (OUTPATIENT)
Age: 35
End: 2025-03-13

## 2025-03-13 ENCOUNTER — APPOINTMENT (OUTPATIENT)
Dept: ANTEPARTUM | Facility: CLINIC | Age: 35
End: 2025-03-13
Payer: COMMERCIAL

## 2025-03-13 PROCEDURE — 76816 OB US FOLLOW-UP PER FETUS: CPT

## 2025-03-13 PROCEDURE — 76819 FETAL BIOPHYS PROFIL W/O NST: CPT | Mod: 59

## 2025-03-24 ENCOUNTER — INPATIENT (INPATIENT)
Facility: HOSPITAL | Age: 35
LOS: 2 days | Discharge: HOME | DRG: 951 | End: 2025-03-27
Attending: OBSTETRICS & GYNECOLOGY | Admitting: OBSTETRICS & GYNECOLOGY
Payer: COMMERCIAL

## 2025-03-24 VITALS
SYSTOLIC BLOOD PRESSURE: 135 MMHG | HEART RATE: 119 BPM | DIASTOLIC BLOOD PRESSURE: 87 MMHG | TEMPERATURE: 99 F | OXYGEN SATURATION: 98 % | RESPIRATION RATE: 18 BRPM

## 2025-03-24 DIAGNOSIS — Z98.890 OTHER SPECIFIED POSTPROCEDURAL STATES: Chronic | ICD-10-CM

## 2025-03-24 DIAGNOSIS — O26.899 OTHER SPECIFIED PREGNANCY RELATED CONDITIONS, UNSPECIFIED TRIMESTER: ICD-10-CM

## 2025-03-24 DIAGNOSIS — Z87.2 PERSONAL HISTORY OF DISEASES OF THE SKIN AND SUBCUTANEOUS TISSUE: Chronic | ICD-10-CM

## 2025-03-24 DIAGNOSIS — Z34.80 ENCOUNTER FOR SUPERVISION OF OTHER NORMAL PREGNANCY, UNSPECIFIED TRIMESTER: ICD-10-CM

## 2025-03-24 DIAGNOSIS — N83.209 UNSPECIFIED OVARIAN CYST, UNSPECIFIED SIDE: Chronic | ICD-10-CM

## 2025-03-24 LAB
ALBUMIN SERPL ELPH-MCNC: 3.3 G/DL — SIGNIFICANT CHANGE UP (ref 3.3–5)
ALP SERPL-CCNC: 141 U/L — HIGH (ref 40–120)
ALT FLD-CCNC: 17 U/L — SIGNIFICANT CHANGE UP (ref 10–45)
ANION GAP SERPL CALC-SCNC: 14 MMOL/L — SIGNIFICANT CHANGE UP (ref 5–17)
APPEARANCE UR: CLEAR — SIGNIFICANT CHANGE UP
AST SERPL-CCNC: 24 U/L — SIGNIFICANT CHANGE UP (ref 10–40)
BACTERIA # UR AUTO: ABNORMAL /HPF
BASOPHILS # BLD AUTO: 0.02 K/UL — SIGNIFICANT CHANGE UP (ref 0–0.2)
BASOPHILS NFR BLD AUTO: 0.2 % — SIGNIFICANT CHANGE UP (ref 0–2)
BILIRUB SERPL-MCNC: 0.3 MG/DL — SIGNIFICANT CHANGE UP (ref 0.2–1.2)
BILIRUB UR-MCNC: NEGATIVE — SIGNIFICANT CHANGE UP
BLD GP AB SCN SERPL QL: NEGATIVE — SIGNIFICANT CHANGE UP
BUN SERPL-MCNC: 11 MG/DL — SIGNIFICANT CHANGE UP (ref 7–23)
CALCIUM SERPL-MCNC: 9.4 MG/DL — SIGNIFICANT CHANGE UP (ref 8.4–10.5)
CAST: 0 /LPF — SIGNIFICANT CHANGE UP (ref 0–4)
CHLORIDE SERPL-SCNC: 105 MMOL/L — SIGNIFICANT CHANGE UP (ref 96–108)
CO2 SERPL-SCNC: 16 MMOL/L — LOW (ref 22–31)
COLOR SPEC: YELLOW — SIGNIFICANT CHANGE UP
CREAT ?TM UR-MCNC: 29 MG/DL — SIGNIFICANT CHANGE UP
CREAT SERPL-MCNC: 0.81 MG/DL — SIGNIFICANT CHANGE UP (ref 0.5–1.3)
DIFF PNL FLD: NEGATIVE — SIGNIFICANT CHANGE UP
EGFR: 96 ML/MIN/1.73M2 — SIGNIFICANT CHANGE UP
EGFR: 96 ML/MIN/1.73M2 — SIGNIFICANT CHANGE UP
EOSINOPHIL # BLD AUTO: 0.14 K/UL — SIGNIFICANT CHANGE UP (ref 0–0.5)
EOSINOPHIL NFR BLD AUTO: 1.2 % — SIGNIFICANT CHANGE UP (ref 0–6)
GLUCOSE SERPL-MCNC: 117 MG/DL — HIGH (ref 70–99)
GLUCOSE UR QL: NEGATIVE MG/DL — SIGNIFICANT CHANGE UP
HCT VFR BLD CALC: 35 % — SIGNIFICANT CHANGE UP (ref 34.5–45)
HGB BLD-MCNC: 11.8 G/DL — SIGNIFICANT CHANGE UP (ref 11.5–15.5)
IMM GRANULOCYTES NFR BLD AUTO: 0.7 % — SIGNIFICANT CHANGE UP (ref 0–0.9)
KETONES UR-MCNC: NEGATIVE MG/DL — SIGNIFICANT CHANGE UP
LDH SERPL L TO P-CCNC: 169 U/L — SIGNIFICANT CHANGE UP (ref 50–242)
LEUKOCYTE ESTERASE UR-ACNC: ABNORMAL
LYMPHOCYTES # BLD AUTO: 2.43 K/UL — SIGNIFICANT CHANGE UP (ref 1–3.3)
LYMPHOCYTES # BLD AUTO: 21 % — SIGNIFICANT CHANGE UP (ref 13–44)
MCHC RBC-ENTMCNC: 29.3 PG — SIGNIFICANT CHANGE UP (ref 27–34)
MCHC RBC-ENTMCNC: 33.7 G/DL — SIGNIFICANT CHANGE UP (ref 32–36)
MCV RBC AUTO: 86.8 FL — SIGNIFICANT CHANGE UP (ref 80–100)
MONOCYTES # BLD AUTO: 0.63 K/UL — SIGNIFICANT CHANGE UP (ref 0–0.9)
MONOCYTES NFR BLD AUTO: 5.4 % — SIGNIFICANT CHANGE UP (ref 2–14)
NEUTROPHILS # BLD AUTO: 8.29 K/UL — HIGH (ref 1.8–7.4)
NEUTROPHILS NFR BLD AUTO: 71.5 % — SIGNIFICANT CHANGE UP (ref 43–77)
NITRITE UR-MCNC: NEGATIVE — SIGNIFICANT CHANGE UP
NRBC BLD AUTO-RTO: 0 /100 WBCS — SIGNIFICANT CHANGE UP (ref 0–0)
PH UR: 7 — SIGNIFICANT CHANGE UP (ref 5–8)
PLATELET # BLD AUTO: 220 K/UL — SIGNIFICANT CHANGE UP (ref 150–400)
POTASSIUM SERPL-MCNC: 4.1 MMOL/L — SIGNIFICANT CHANGE UP (ref 3.5–5.3)
POTASSIUM SERPL-SCNC: 4.1 MMOL/L — SIGNIFICANT CHANGE UP (ref 3.5–5.3)
PROT ?TM UR-MCNC: <7 MG/DL — SIGNIFICANT CHANGE UP (ref 0–12)
PROT SERPL-MCNC: 6.5 G/DL — SIGNIFICANT CHANGE UP (ref 6–8.3)
PROT UR-MCNC: NEGATIVE MG/DL — SIGNIFICANT CHANGE UP
PROT/CREAT UR-RTO: SIGNIFICANT CHANGE UP (ref 0–0.2)
RBC # BLD: 4.03 M/UL — SIGNIFICANT CHANGE UP (ref 3.8–5.2)
RBC # FLD: 14.2 % — SIGNIFICANT CHANGE UP (ref 10.3–14.5)
RBC CASTS # UR COMP ASSIST: 0 /HPF — SIGNIFICANT CHANGE UP (ref 0–4)
REVIEW: SIGNIFICANT CHANGE UP
RH IG SCN BLD-IMP: POSITIVE — SIGNIFICANT CHANGE UP
RH IG SCN BLD-IMP: POSITIVE — SIGNIFICANT CHANGE UP
SODIUM SERPL-SCNC: 135 MMOL/L — SIGNIFICANT CHANGE UP (ref 135–145)
SP GR SPEC: 1.01 — SIGNIFICANT CHANGE UP (ref 1–1.03)
SQUAMOUS # UR AUTO: 5 /HPF — SIGNIFICANT CHANGE UP (ref 0–5)
URATE SERPL-MCNC: 6 MG/DL — SIGNIFICANT CHANGE UP (ref 2.5–7)
UROBILINOGEN FLD QL: 0.2 MG/DL — SIGNIFICANT CHANGE UP (ref 0.2–1)
WBC # BLD: 11.59 K/UL — HIGH (ref 3.8–10.5)
WBC # FLD AUTO: 11.59 K/UL — HIGH (ref 3.8–10.5)
WBC UR QL: 4 /HPF — SIGNIFICANT CHANGE UP (ref 0–5)

## 2025-03-24 RX ORDER — CITRIC ACID/SODIUM CITRATE 300-500 MG
15 SOLUTION, ORAL ORAL EVERY 6 HOURS
Refills: 0 | Status: DISCONTINUED | OUTPATIENT
Start: 2025-03-24 | End: 2025-03-25

## 2025-03-24 RX ORDER — INFLUENZA A VIRUS A/IDAHO/07/2018 (H1N1) ANTIGEN (MDCK CELL DERIVED, PROPIOLACTONE INACTIVATED, INFLUENZA A VIRUS A/INDIANA/08/2018 (H3N2) ANTIGEN (MDCK CELL DERIVED, PROPIOLACTONE INACTIVATED), INFLUENZA B VIRUS B/SINGAPORE/INFTT-16-0610/2016 ANTIGEN (MDCK CELL DERIVED, PROPIOLACTONE INACTIVATED), INFLUENZA B VIRUS B/IOWA/06/2017 ANTIGEN (MDCK CELL DERIVED, PROPIOLACTONE INACTIVATED) 15; 15; 15; 15 UG/.5ML; UG/.5ML; UG/.5ML; UG/.5ML
0.5 INJECTION, SUSPENSION INTRAMUSCULAR ONCE
Refills: 0 | Status: DISCONTINUED | OUTPATIENT
Start: 2025-03-24 | End: 2025-03-27

## 2025-03-24 RX ORDER — OXYTOCIN-SODIUM CHLORIDE 0.9% IV SOLN 30 UNIT/500ML 30-0.9/5 UT/ML-%
167 SOLUTION INTRAVENOUS
Qty: 30 | Refills: 0 | Status: DISCONTINUED | OUTPATIENT
Start: 2025-03-24 | End: 2025-03-27

## 2025-03-24 RX ORDER — SODIUM CHLORIDE 9 G/1000ML
1000 INJECTION, SOLUTION INTRAVENOUS
Refills: 0 | Status: DISCONTINUED | OUTPATIENT
Start: 2025-03-24 | End: 2025-03-25

## 2025-03-24 RX ORDER — OXYTOCIN-SODIUM CHLORIDE 0.9% IV SOLN 30 UNIT/500ML 30-0.9/5 UT/ML-%
2 SOLUTION INTRAVENOUS
Qty: 30 | Refills: 0 | Status: DISCONTINUED | OUTPATIENT
Start: 2025-03-24 | End: 2025-03-27

## 2025-03-24 RX ADMIN — OXYTOCIN-SODIUM CHLORIDE 0.9% IV SOLN 30 UNIT/500ML 2 MILLIUNIT(S)/MIN: 30-0.9/5 SOLUTION at 23:27

## 2025-03-24 NOTE — OB PROVIDER H&P - ASSESSMENT
26 yo  #38w1d admitted to L&D now meeting criteria for gHTN, plan for IOL TOLAC.    A/P  #gHTN:  - BP q 15 mins  - HELLP Labs     - Admit to L&D  - Routine labs   - EFM/TOCO: Resuscitative measures PRN  - Clear liquid diet  - IVH  - Anesthesia consult   - Bicitra  - GBS negative  - Epi PRN, patient counseled on the benefits of early epidural    - IOL plan pitocin/CB  - Expect     dw Dr. Hyun Diez PA-C

## 2025-03-24 NOTE — OB RN PATIENT PROFILE - NS_OBGYNHISTORY_OBGYN_ALL_OB_FT
OBHx:  FT pLTCS breech, 6lbs 11oz             2019 FT  6lbs, IOL 2.2 Oligo, c/b PEC patient on labetalol 6 months pp            2007 eTOP @6w s/p D&C  GYNHx: 2017 Hemorrhagic Cyst s/p laparotomy. No fibroids, hx of abnormal pap or STDs

## 2025-03-24 NOTE — OB PROVIDER H&P - ATTENDING COMMENTS
Covering Ob/Gyn Attending (Mon 8 am-8pm)     36 yo P2 @ 38 wks was sent from the office with elevated blood pressures in mild range.   In triage her BP's are in mild range, she denies symptoms of preeclampsia.   She had preeclampsia in prior pregnancy didn't require Magnesium sulfate.   Patient had a history of  and C/Section for breech.   She also had back surgery (lumbar discectomy and was cleared for regional anesthesia and laparoscopic ovarian cystectomy for hemaperitoneum from ruptured hemorrhagic cyst.   Meds: Vitamins  NKA   BP mild range  FHT reassuring, occasional contractions  VE-3/70/-3 vtx   EFW-3200 g     Labs reviewed: Hematocrit 35, Platelets 220; no urine protein.     A/P 36 yo P2 @ 38 weeks with gestational hypertension  Will admit for delivery.   Labs sent  Patient desires TOLAC  Patient is having occasional contractions, will augment labor with Pitocin.   Patient is signed off to Dr. Stevens.

## 2025-03-24 NOTE — OB PROVIDER H&P - NSLOWPPHRISK_OBGYN_A_OB
No previous uterine incision/[place] Pregnancy/Less than or equal to 4 previous vaginal births/No known bleeding disorder/No history of postpartum hemorrhage

## 2025-03-24 NOTE — OB PROVIDER H&P - NSINFECTIONS_OBGYN_ALL_OB
Patient given PRN Glycolax per request r/t c/o constipation. Per flowsheets, last BM 2/4/25.    None

## 2025-03-24 NOTE — OB PROVIDER H&P - NSHPPHYSICALEXAM_GEN_ALL_CORE
PE:  T(C): 37 (03-24-25 @ 18:39), Max: 37.0 (03-24-25 @ 18:27)  HR: 93 (03-24-25 @ 19:47) (82 - 119)  BP: 121/80 (03-24-25 @ 19:43) (121/80 - 152/92)  RR: 18 (03-24-25 @ 18:39) (18 - 18)  SpO2: 98% (03-24-25 @ 19:47) (91% - 99%)  General: NAD, A&Ox3    CV: Extremities well perfused on visual inspection  Lungs: Nonlabored breathing on RA  Abd: soft, nontender, gravid  VE: 3 / 70 / -3  BSUS: Vertex  EFM: 135/moderate variablity/+accels/-decels  TOCO: Occassional

## 2025-03-24 NOTE — OB RN PATIENT PROFILE - NS_ADMITDT_OBGYN_ALL_OB_DT
----- Message from DAVE Mendez sent at 7/5/2023  3:30 PM CDT -----  Regarding: OP ECT  Hello Linh:    Benefits were verified and no auth is required. Pt is good to start!    ThanksCole  ----- Message -----  From: Cole Silva LICSW  Sent: 7/5/2023   7:42 AM CDT  To: DAVE Quintero; #  Subject: RE: OP ECT                                       Vicenta Melton:    I will request benefits and update you once bens are verified.    Thanks!      ----- Message -----  From: Linh Coburn RN  Sent: 7/3/2023   4:26 PM CDT  To: [facility]; [facility]  Subject: OP ECT                                           This patient is hoping to restart OP ECT.  Can you pull benefits and see if anything is needed?  Thanks!    Nerissa         
24-Mar-2025 19:35

## 2025-03-24 NOTE — OB PROVIDER H&P - HISTORY OF PRESENT ILLNESS
34 yo  @38w1d reports to triage reporting elevated BPs in the office today. Patient endorses she was last found to be 3/70/-3 in the office this morning. Patient reports she would like to TOLAC. Last ate @5pm. Patient reports she otherwise feels well, has had intermittent headaches over the past few weeks however none currently. Denies headache, visual changes, epigastric pain, CP, SOB.     PNC Uncomplicated   GBS neg  EFW: 6lbs 14 oz per sono @36weeks; extrapolated to 3500g     OBHx:  FT pLTCS breech, 6lbs 11oz             2019 FT  6lbs, IOL 2.2 Oligo, c/b PEC patient on labetalol 6 months pp             eTOP @6w s/p D&C  GYNHx: 2017 Hemorrhagic Cyst s/p laparotomy. No fibroids, hx of abnormal pap or STDs  PMHx: No hx of DM, HTN, asthma, bleeding or clotting disorders or blood transfusions.  PSurgHx:  Laminectomy/Microdiscectomy; patient previously saw [id number] in prior pregnancy and was cleared for epi/spinal. Of noter patient reports her spinal lasted much longer than anesthesia anticipated in post partum. Patient karyn not see anesthesia again during this pregnancy.                  Laparoscopy Hemorrhagic Cyst.                  pLTCS for breech   Allergies: NKDA, Neosporin Topical Ointment: Hives  Meds: PNV q d            bASA 81mg q d  Social: No smoking, alcohol, or illicit drug use during pregnancy   Psych: No hx of anxiety or depression 34 yo  @38w1d reports to triage reporting elevated BPs in the office today. Patient endorses she was last found to be 3/70/-3 in the office this morning. Patient reports she would like to TOLAC. Last ate @5pm. Patient reports she otherwise feels well, has had intermittent headaches over the past few weeks however none currently. Denies headache, visual changes, epigastric pain, CP, SOB. Last ate 5pm.    PNC Uncomplicated   GBS neg  EFW: 6lbs 14 oz per sono @36weeks; extrapolated to 3500g     OBHx:  FT pLTCS breech, 6lbs 11oz             2019 FT  6lbs, IOL 2.2 Oligo, c/b PEC patient on labetalol 6 months pp             eTOP @6w s/p D&C  GYNHx: 2017 Hemorrhagic Cyst s/p laparotomy. No fibroids, hx of abnormal pap or STDs  PMHx: No hx of DM, HTN, asthma, bleeding or clotting disorders or blood transfusions.  PSurgHx:  Laminectomy/Microdiscectomy; patient previously saw [id number] in prior pregnancy and was cleared for epi/spinal. Of noter patient reports her spinal lasted much longer than anesthesia anticipated in post partum. Patient karyn not see anesthesia again during this pregnancy.                  Laparoscopy Hemorrhagic Cyst.                  pLTCS for breech   Allergies: NKDA, Neosporin Topical Ointment: Hives  Meds: PNV q d            bASA 81mg q d  Social: No smoking, alcohol, or illicit drug use during pregnancy   Psych: No hx of anxiety or depression 34 yo  @38w1d reports to triage reporting elevated BPs in the office today. Patient endorses she was last found to be 3/70/-3 in the office this morning. Patient reports she would like to TOLAC. Last ate @5pm. Patient reports she otherwise feels well, has had intermittent headaches over the past few weeks however none currently. Denies headache, visual changes, epigastric pain, CP, SOB. Last ate 5pm.    PNC Uncomplicated   GBS neg  EFW: 6lbs 14 oz per sono @36weeks; extrapolated to 3500g     OBHx:  FT pLTCS breech, 6lbs 11oz             2019 FT  6lbs, IOL 2.2 Oligo, c/b PEC patient on labetalol 6 months pp             eTOP @6w s/p D&C  GYNHx: 2017 Hemorrhagic Cyst s/p laparoscopic cystectomy. No fibroids, hx of abnormal pap or STDs  PMHx: No hx of DM, HTN, asthma, bleeding or clotting disorders or blood transfusions.  PSurgHx:  Laminectomy/Microdiscectomy; patient previously saw anesthesiology in prior pregnancy and was cleared for epi/spinal. Of noter patient reports her spinal lasted much longer than anesthesia anticipated in post partum. Patient did not see anesthesia again during this pregnancy.                  Laparoscopic cystectomy for Hemorrhagic Cyst.                  pLTCS for breech   Allergies: NKDA, Neosporin Topical Ointment: Hives  Meds: PNV q d            bASA 81mg q d  Social: No smoking, alcohol, or illicit drug use during pregnancy   Psych: No hx of anxiety or depression

## 2025-03-24 NOTE — OB PROVIDER H&P - NS_OBGYNHISTORY_OBGYN_ALL_OB_FT
OBHx:  FT pLTCS breech, 6lbs 11oz             2019 FT  6lbs, IOL 2.2 Oligo, c/b PEC patient on labetalol 6 months pp            2007 eTOP @6w s/p D&C  GYNHx: 2017 Hemorrhagic Cyst s/p laparotomy. No fibroids, hx of abnormal pap or STDs OBHx:  FT pLTCS breech, 6lbs 11oz             2019 FT  6lbs, IOL 2.2 Oligo, c/b PEC patient on labetalol 6 months pp            2007 eTOP @6w s/p D&C  GYNHx: 2017 Hemorrhagic Cyst s/p laparoscopic cystectomy. No fibroids, hx of abnormal pap or STDs

## 2025-03-24 NOTE — OB RN TRIAGE NOTE - FALL HARM RISK - UNIVERSAL INTERVENTIONS
Bed in lowest position, wheels locked, appropriate side rails in place/Call bell, personal items and telephone in reach/Instruct patient to call for assistance before getting out of bed or chair/Non-slip footwear when patient is out of bed/[place] to call system/Physically safe environment - no spills, clutter or unnecessary equipment/Purposeful Proactive Rounding/Room/bathroom lighting operational, light cord in reach

## 2025-03-24 NOTE — OB RN TRIAGE NOTE - NS_OBGYNHISTORY_OBGYN_ALL_OB_FT
2007 ETOP- 6 weeks gestation - no complication  2019 NVD- 39 weeks- PP Deresssion no meds- elevated - c/ section breech  Hemmorrhagic cyst right ovary 2017

## 2025-03-24 NOTE — PRE-ANESTHESIA EVALUATION ADULT - NSANTHPEFT_GEN_ALL_CORE
Gen: WNWD, NAD   Neuro: AAOx4, moves all four extremities spontaneously  Resp: Unlabored breathing on room air, speaks in full sentences   Abd: Gravid uterus

## 2025-03-24 NOTE — OB RN PATIENT PROFILE - AS HEIGHT TYPE
"[facility]   Physical Therapy Treatment Note      Name: Nilesh Almaraz  [facility] Number: [id number]    Therapy Diagnosis:   Encounter Diagnosis   Name Primary?    Difficulty walking Yes       Physician: Khang Pedroza*    Visit Date: 1/25/2024      Physician Orders: PT Eval and Treat   Medical Diagnosis from Referral: Acute right ankle pain [M25.571], Achilles tendinitis of right lower extremity [M76.61]   Evaluation Date: 1/5/2024  Authorization Period Expiration: 12/31/2024  Plan of Care Expiration: 4/5/2024  Progress Note Due: 2/5/2024  Date of Surgery: na   Visit # / Visits authorized: 3/20  FOTO: 2/ 3     Precautions: Standard, pmh prostate  CA (remission >1 year)     Time In: 328 PM  Time Out: 412 PM  Total Billable Time: 44 minutes (TE-2, MT-1)    PTA Visit #: 1/5       Subjective     Patient reports: using Voltaren and another cream and wraps the foot in Saran wrap and sleeps in it. The foot is a little better, unsure if this is related to the gels/creams or the exercises. The foot seems to hurt when standing for a long amount of time or sitting for a long amount of time.  He was compliant with home exercise program.  Response to previous treatment:pain after exercises   Functional change: ongoing     Pain: 2/10 when walking, 0/10 at rest   Location: right foot/ ankle       Objective      Objective Measures updated at progress report unless specified.     Treatment     Art received the treatments listed below:        Bold=performed today     therapeutic exercises to develop strength, endurance, ROM, flexibility, posture, and core stabilization for 29 minutes including:     +toe abd and add 2x10  Attempted toe yoga but pt was unable to perform   Heel raises eccentric 3 x 10  +gastroc heel raise eccentric at step 2x10  +standing gastroc stretch at wall 3x30"  +standing soleus stretch at wall 3x30"  +ankle 4 way GTB x20 each direction  Ankle ISO 6s x 6 EVER/INV blue   Gastroc " stretch in standing 3 x 45s (gentle)  standing great toe stretch 3 x 20s  therabar   Standing extensor stretching 45s x 3   Standing quad stretch 5 x 10 seconds  Hamstring stretch with strap 10 x 10 seconds      Manual therapy: 15 minutes   IASTM with graston tool and STM  R achilles             Patient Education and Home Exercises       Education provided:   - updated HEP    Written Home Exercises Provided: yes. Exercises were reviewed and Art was able to demonstrate them prior to the end of the session.  Art demonstrated good  understanding of the education provided. See Electronic Medical Record under Patient Instructions for exercises provided during therapy sessions    Assessment     Progressed gastroc eccentric exercise with eccentric lowering heel raises at edge of step. Attempted toe yoga today but pt cannot perform so we performed toe abduction/adduction to improve intrinsic foot strength. Pt responds well to session today with appropriate challenge. HEP is updated and reviewed with patient.     Art Is progressing well towards his goals.   Patient prognosis is Excellent.     Patient will continue to benefit from skilled outpatient physical therapy to address the deficits listed in the problem list box on initial evaluation, provide pt/family education and to maximize pt's level of independence in the home and community environment.     Patient's spiritual, cultural and educational needs considered and pt agreeable to plan of care and goals.     Anticipated barriers to physical therapy: none.    Goals: progressing  Short Term Goals: 4 weeks   Pt demonstrate indep with initial hep - met  Pt demonstrate normalized gait pattern without pain     Long Term Goals: 12 weeks   Pt demonstrate improved foto score from 60 to 70  Pt demonstrate no difficulty with tasks like stair navigation  Pt demonstrate ability to walk 1 mile pain-free     Plan     Continue with Plan Of Care and progress toward therapist  goals.    Jaqueline Jacob, PT        stated

## 2025-03-25 LAB
HCV RNA SPEC NAA+PROBE-LOG IU: SIGNIFICANT CHANGE UP
HCV RNA SPEC NAA+PROBE-LOG IU: SIGNIFICANT CHANGE UP LOGIU/ML
HIV 1+2 AB+HIV1 P24 AG SERPL QL IA: SIGNIFICANT CHANGE UP
T PALLIDUM AB TITR SER: NEGATIVE — SIGNIFICANT CHANGE UP

## 2025-03-25 RX ORDER — IBUPROFEN 200 MG
600 TABLET ORAL EVERY 6 HOURS
Refills: 0 | Status: COMPLETED | OUTPATIENT
Start: 2025-03-25 | End: 2026-02-21

## 2025-03-25 RX ORDER — MODIFIED LANOLIN 100 %
1 CREAM (GRAM) TOPICAL EVERY 6 HOURS
Refills: 0 | Status: DISCONTINUED | OUTPATIENT
Start: 2025-03-25 | End: 2025-03-27

## 2025-03-25 RX ORDER — OXYCODONE HYDROCHLORIDE 30 MG/1
5 TABLET ORAL ONCE
Refills: 0 | Status: DISCONTINUED | OUTPATIENT
Start: 2025-03-25 | End: 2025-03-27

## 2025-03-25 RX ORDER — KETOROLAC TROMETHAMINE 30 MG/ML
30 INJECTION, SOLUTION INTRAMUSCULAR; INTRAVENOUS ONCE
Refills: 0 | Status: DISCONTINUED | OUTPATIENT
Start: 2025-03-25 | End: 2025-03-25

## 2025-03-25 RX ORDER — PRENATAL 136/IRON/FOLIC ACID 27 MG-1 MG
1 TABLET ORAL DAILY
Refills: 0 | Status: DISCONTINUED | OUTPATIENT
Start: 2025-03-25 | End: 2025-03-27

## 2025-03-25 RX ORDER — SIMETHICONE 80 MG
80 TABLET,CHEWABLE ORAL EVERY 4 HOURS
Refills: 0 | Status: DISCONTINUED | OUTPATIENT
Start: 2025-03-25 | End: 2025-03-27

## 2025-03-25 RX ORDER — MAGNESIUM HYDROXIDE 400 MG/5ML
30 SUSPENSION ORAL
Refills: 0 | Status: DISCONTINUED | OUTPATIENT
Start: 2025-03-25 | End: 2025-03-27

## 2025-03-25 RX ORDER — DIBUCAINE 10 MG/G
1 OINTMENT TOPICAL EVERY 6 HOURS
Refills: 0 | Status: DISCONTINUED | OUTPATIENT
Start: 2025-03-25 | End: 2025-03-27

## 2025-03-25 RX ORDER — OXYCODONE HYDROCHLORIDE 30 MG/1
5 TABLET ORAL
Refills: 0 | Status: DISCONTINUED | OUTPATIENT
Start: 2025-03-25 | End: 2025-03-27

## 2025-03-25 RX ORDER — WITCH HAZEL LEAF
1 FLUID EXTRACT MISCELLANEOUS EVERY 4 HOURS
Refills: 0 | Status: DISCONTINUED | OUTPATIENT
Start: 2025-03-25 | End: 2025-03-27

## 2025-03-25 RX ORDER — ACETAMINOPHEN 500 MG/5ML
975 LIQUID (ML) ORAL
Refills: 0 | Status: DISCONTINUED | OUTPATIENT
Start: 2025-03-25 | End: 2025-03-27

## 2025-03-25 RX ORDER — HYDROCORTISONE 10 MG/G
1 CREAM TOPICAL EVERY 6 HOURS
Refills: 0 | Status: DISCONTINUED | OUTPATIENT
Start: 2025-03-25 | End: 2025-03-27

## 2025-03-25 RX ORDER — PRAMOXINE HCL 1 %
1 GEL (GRAM) TOPICAL EVERY 4 HOURS
Refills: 0 | Status: DISCONTINUED | OUTPATIENT
Start: 2025-03-25 | End: 2025-03-27

## 2025-03-25 RX ORDER — CLOSTRIDIUM TETANI TOXOID ANTIGEN (FORMALDEHYDE INACTIVATED), CORYNEBACTERIUM DIPHTHERIAE TOXOID ANTIGEN (FORMALDEHYDE INACTIVATED), BORDETELLA PERTUSSIS TOXOID ANTIGEN (GLUTARALDEHYDE INACTIVATED), BORDETELLA PERTUSSIS FILAMENTOUS HEMAGGLUTININ ANTIGEN (FORMALDEHYDE INACTIVATED), BORDETELLA PERTUSSIS PERTACTIN ANTIGEN, AND BORDETELLA PERTUSSIS FIMBRIAE 2/3 ANTIGEN 5; 2; 2.5; 5; 3; 5 [LF]/.5ML; [LF]/.5ML; UG/.5ML; UG/.5ML; UG/.5ML; UG/.5ML
0.5 INJECTION, SUSPENSION INTRAMUSCULAR ONCE
Refills: 0 | Status: DISCONTINUED | OUTPATIENT
Start: 2025-03-25 | End: 2025-03-27

## 2025-03-25 RX ORDER — DIPHENHYDRAMINE HCL 12.5MG/5ML
25 ELIXIR ORAL EVERY 6 HOURS
Refills: 0 | Status: DISCONTINUED | OUTPATIENT
Start: 2025-03-25 | End: 2025-03-27

## 2025-03-25 RX ORDER — OXYTOCIN-SODIUM CHLORIDE 0.9% IV SOLN 30 UNIT/500ML 30-0.9/5 UT/ML-%
167 SOLUTION INTRAVENOUS
Qty: 30 | Refills: 0 | Status: DISCONTINUED | OUTPATIENT
Start: 2025-03-25 | End: 2025-03-27

## 2025-03-25 RX ORDER — BENZOCAINE 220 MG/G
1 SPRAY, METERED PERIODONTAL EVERY 6 HOURS
Refills: 0 | Status: DISCONTINUED | OUTPATIENT
Start: 2025-03-25 | End: 2025-03-27

## 2025-03-25 RX ADMIN — Medication 1 TABLET(S): at 23:03

## 2025-03-25 RX ADMIN — Medication 975 MILLIGRAM(S): at 23:03

## 2025-03-25 RX ADMIN — KETOROLAC TROMETHAMINE 30 MILLIGRAM(S): 30 INJECTION, SOLUTION INTRAMUSCULAR; INTRAVENOUS at 21:42

## 2025-03-25 NOTE — OB RN DELIVERY SUMMARY - NS_SEPSISRSKCALC_OBGYN_ALL_OB_FT
EOS calculated successfully. EOS Risk Factor: 0.5/1000 live births ([facility] national incidence); GA=38w2d; Temp=98.6; ROM=3.65; GBS='Unknown'; Antibiotics='No antibiotics or any antibiotics < 2 hrs prior to birth'

## 2025-03-25 NOTE — OB PROVIDER LABOR PROGRESS NOTE - NS_OBIHICONTRACTIONPATTERNDETAILS_OBGYN_ALL_OB_FT
irregular, q 3-7 min  on Pitocin 2 mu/min infusion
contractions are now more regular, occuring q 4-5 min  Pitocin infusing at 6 mu/min
q2-3 min

## 2025-03-25 NOTE — OB PROVIDER DELIVERY SUMMARY - NSPROVIDERDELIVERYNOTE_OBGYN_ALL_OB_FT
ob attg delivery note  pt was found to be fully dilated and was coached to start her pushing efforts.  after about 30 min of maternal pushing, she was successful to bring down the fetal presenting part in the birth canal.  She delivered vaginally over an intact perineum.  no nuchal cords were noted.  no difficulty w/ delivery of shoulders.  cord clamping delayed for a few minutes.  cord gases were send.  umbilical blood tube samples were collected.  inspection revealed a small 1st degree perineal laceration.  this lac was repaired using Chromic sutures in standard fashion.  intact placenta was then delivered.  placenta and cord remnant were noted to be grossly normal and were discarded.  uterine tone normal immediately after delivery.  blood loss estimated 400cc.  baby w/ normal Apgar scores.  mother and baby bonding skin-to-skin in the DR and both in stable conditions.   Dr. Stevens, attg.  Dr. Wharton, resident, assisting

## 2025-03-25 NOTE — OB PROVIDER LABOR PROGRESS NOTE - NS_OBIHIFHRDETAILS_OBGYN_ALL_OB_FT
cat I
normal, category 1 FHR tracing
normal, category 1 FHR tracing
baseline 135 bpm, moderate variability, +accels, -decels

## 2025-03-25 NOTE — OB PROVIDER LABOR PROGRESS NOTE - NS_SUBJECTIVE/OBJECTIVE_OBGYN_ALL_OB_FT
S:  Pt seen and examined for increased pain.    O:  Vital Signs Last 24 Hrs  T(C): 36.9 (25 Mar 2025 12:55), Max: 37.0 (24 Mar 2025 18:27)  T(F): 98.42 (25 Mar 2025 12:55), Max: 98.6 (24 Mar 2025 18:27)  HR: 75 (25 Mar 2025 16:11) (52 - 138)  BP: 158/85 (25 Mar 2025 16:10) (100/65 - 180/93)  BP(mean): --  RR: 14 (25 Mar 2025 12:55) (14 - 18)  SpO2: 90% (25 Mar 2025 16:16) (80% - 100%)    Parameters below as of 24 Mar 2025 19:55  Patient On (Oxygen Delivery Method): room air
pt has epidural anesthesia in place, and is comfortable at this time.
Pt evaluated at bedside after cervical balloon dislodged spontaneously per RN. Pt comfortable with an epidural without complains.     ICU Vital Signs Last 24 Hrs  T(C): 36.8 (25 Mar 2025 01:05), Max: 37.0 (24 Mar 2025 18:27)  T(F): 98.24 (25 Mar 2025 01:05), Max: 98.6 (24 Mar 2025 18:27)  HR: 63 (25 Mar 2025 02:35) (52 - 138)  BP: 127/74 (25 Mar 2025 02:25) (103/63 - 167/101)  RR: 17 (24 Mar 2025 22:00) (17 - 18)  SpO2: 99% (25 Mar 2025 02:35) (91% - 100%)    O2 Parameters below as of 24 Mar 2025 19:55  Patient On (Oxygen Delivery Method): room air
pt is feeling increasing pelvic pressure and discomfort w/ her contractions
Pt seen for AROM, clear fluid. Pt comfortable with epidural in place

## 2025-03-25 NOTE — OB PROVIDER LABOR PROGRESS NOTE - ASSESSMENT
Pt is a 34yo  @38.2w admitted for IOL for gHTN, desiring TOLAC.    - Continue pitocin (currently at 6mu)  - S/p AROM, CB  - Continue to monitor BPs  - Epidural in place, patient uncomfortable, but not desiring top off at this time  - Continue cont EFM, toco, IVF  - Maternal repositioning  - 2u on hold    D/w Dr. Stevens, attending   Tena Wharton MD PGY1
A/P:  -EFM: resuscitative measures prn  -continue pitocin   -2u on hold  -anticipate     d/w Dr. Stevens
a/p:   Induction for gHTN  pt is now in active phase of her labor.   BP's are more elevated now, but it could be due to pt's increasing discomfort.  will monitor.   progress has been protracted.    AROM done, Pitocin is being increased gradually.  will monitor maternal and fetal conditions closely.   continue expectant mgmt. 
25F  at 38w2d gestational age, admitted to L&D for an IOL for TOLAC in the setting of newly diagnosed gHTN.    -Cervical changes noted: /-3  -Pitocin at 2mu  -Continue to monitor EFM/toco  -Pain control: epidural in place    Chelsea Calderon PA-C    
a/p:   induction of labor for gHTN v mild pec at 38+ wks.    s/p cervical balloon  hx of prior cs, hoping for .  currently maternal and fetal conditions are stable and reassuring.   pt and her partner have been fully counseled re r/b's of tolac vs r cs.  they would like to labor and have a vaginal delivery  continue Pitocin. will adjust and titrate dose for regular and adequate contractions.   will consider AROM when fetal presenting part is lower and adequately engaged in the birth canal.   GBS neg, no prophylaxis needed.  EFW is 7 to 7.5 lb by my clinical estimate.

## 2025-03-25 NOTE — OB RN DELIVERY SUMMARY - NSSELHIDDEN_OBGYN_ALL_OB_FT
[NS_DeliveryAttending1_OBGYN_ALL_OB_FT:[id number]],[NS_DeliveryAssist1_OBGYN_ALL_OB_FT:[email or web address]],[NS_DeliveryRN_OBGYN_ALL_OB_FT:[id number]=]

## 2025-03-25 NOTE — OB PROVIDER DELIVERY SUMMARY - NSLOWPPHRISK_OBGYN_A_OB
Nguyen Pregnancy/Less than or equal to 4 previous vaginal births/No known bleeding disorder/No history of postpartum hemorrhage/No other PPH risks indicated

## 2025-03-26 LAB
ALBUMIN SERPL ELPH-MCNC: 2.9 G/DL — LOW (ref 3.3–5)
ALP SERPL-CCNC: 120 U/L — SIGNIFICANT CHANGE UP (ref 40–120)
ALT FLD-CCNC: 14 U/L — SIGNIFICANT CHANGE UP (ref 10–45)
ANION GAP SERPL CALC-SCNC: 15 MMOL/L — SIGNIFICANT CHANGE UP (ref 5–17)
AST SERPL-CCNC: 31 U/L — SIGNIFICANT CHANGE UP (ref 10–40)
BILIRUB SERPL-MCNC: 0.4 MG/DL — SIGNIFICANT CHANGE UP (ref 0.2–1.2)
BUN SERPL-MCNC: 9 MG/DL — SIGNIFICANT CHANGE UP (ref 7–23)
CALCIUM SERPL-MCNC: 9.3 MG/DL — SIGNIFICANT CHANGE UP (ref 8.4–10.5)
CHLORIDE SERPL-SCNC: 102 MMOL/L — SIGNIFICANT CHANGE UP (ref 96–108)
CO2 SERPL-SCNC: 17 MMOL/L — LOW (ref 22–31)
CREAT SERPL-MCNC: 0.75 MG/DL — SIGNIFICANT CHANGE UP (ref 0.5–1.3)
EGFR: 106 ML/MIN/1.73M2 — SIGNIFICANT CHANGE UP
EGFR: 106 ML/MIN/1.73M2 — SIGNIFICANT CHANGE UP
GLUCOSE SERPL-MCNC: 83 MG/DL — SIGNIFICANT CHANGE UP (ref 70–99)
HCT VFR BLD CALC: 32.9 % — LOW (ref 34.5–45)
HGB BLD-MCNC: 11.1 G/DL — LOW (ref 11.5–15.5)
MCHC RBC-ENTMCNC: 29.4 PG — SIGNIFICANT CHANGE UP (ref 27–34)
MCHC RBC-ENTMCNC: 33.7 G/DL — SIGNIFICANT CHANGE UP (ref 32–36)
MCV RBC AUTO: 87 FL — SIGNIFICANT CHANGE UP (ref 80–100)
NRBC BLD AUTO-RTO: 0 /100 WBCS — SIGNIFICANT CHANGE UP (ref 0–0)
PLATELET # BLD AUTO: 192 K/UL — SIGNIFICANT CHANGE UP (ref 150–400)
POTASSIUM SERPL-MCNC: 4.1 MMOL/L — SIGNIFICANT CHANGE UP (ref 3.5–5.3)
POTASSIUM SERPL-SCNC: 4.1 MMOL/L — SIGNIFICANT CHANGE UP (ref 3.5–5.3)
PROT SERPL-MCNC: 5.7 G/DL — LOW (ref 6–8.3)
RBC # BLD: 3.78 M/UL — LOW (ref 3.8–5.2)
RBC # FLD: 14.3 % — SIGNIFICANT CHANGE UP (ref 10.3–14.5)
SODIUM SERPL-SCNC: 134 MMOL/L — LOW (ref 135–145)
WBC # BLD: 16.14 K/UL — HIGH (ref 3.8–10.5)
WBC # FLD AUTO: 16.14 K/UL — HIGH (ref 3.8–10.5)

## 2025-03-26 RX ORDER — IBUPROFEN 200 MG
600 TABLET ORAL EVERY 6 HOURS
Refills: 0 | Status: DISCONTINUED | OUTPATIENT
Start: 2025-03-26 | End: 2025-03-27

## 2025-03-26 RX ADMIN — Medication 1 TABLET(S): at 15:24

## 2025-03-26 RX ADMIN — Medication 600 MILLIGRAM(S): at 15:23

## 2025-03-26 RX ADMIN — Medication 975 MILLIGRAM(S): at 23:40

## 2025-03-26 RX ADMIN — Medication 600 MILLIGRAM(S): at 09:00

## 2025-03-26 RX ADMIN — Medication 975 MILLIGRAM(S): at 06:24

## 2025-03-26 RX ADMIN — Medication 975 MILLIGRAM(S): at 18:26

## 2025-03-26 RX ADMIN — Medication 600 MILLIGRAM(S): at 03:00

## 2025-03-26 RX ADMIN — Medication 600 MILLIGRAM(S): at 02:30

## 2025-03-26 RX ADMIN — Medication 600 MILLIGRAM(S): at 09:30

## 2025-03-26 RX ADMIN — Medication 975 MILLIGRAM(S): at 00:05

## 2025-03-26 RX ADMIN — Medication 600 MILLIGRAM(S): at 21:27

## 2025-03-26 RX ADMIN — Medication 600 MILLIGRAM(S): at 15:53

## 2025-03-26 RX ADMIN — Medication 975 MILLIGRAM(S): at 06:03

## 2025-03-26 RX ADMIN — Medication 975 MILLIGRAM(S): at 17:56

## 2025-03-26 RX ADMIN — Medication 600 MILLIGRAM(S): at 20:14

## 2025-03-26 NOTE — PROGRESS NOTE ADULT - ASSESSMENT
34y/o PPD#1 from  complicated by gHTN. Patient is currently in stable condition.    #gHTN  - BP's well-controlled overnight, normal to mild range  - HELLP labs wnl, P/C 0.2  - Continue to monitor BP's    #Routine Postpartum Care  - Continue with PO analgesia  - Increase ambulation  - Continue regular diet  - No labs    BETZY Burgos PGY1    **INCOMPLETE NOTE**   34y/o PPD#1 from  complicated by gHTN. Patient is currently in stable condition.    #gHTN  - BP's well-controlled overnight, normal to mild range  - HELLP labs wnl, P/C 0.2  - Continue to monitor BP's    #Routine Postpartum Care  - Continue with PO analgesia  - Increase ambulation  - Continue regular diet  - No labs    BETZY Burgos PGY1

## 2025-03-26 NOTE — PROGRESS NOTE ADULT - ATTENDING COMMENTS
I have personally seen, examined, and participated in the care of this patient. I have reviewed all pertinent clinical information, including history, physical exam, plan, and the Resident 's note and agree except as noted.                abd- soft, nontender               uterus- nontender, firm , below umbilicus               lochia- mild.               ext- no cords.  a/p  S/P          Increase OOB         Regular diet         PO Pain protocol         Routine Postpartum Care

## 2025-03-27 ENCOUNTER — TRANSCRIPTION ENCOUNTER (OUTPATIENT)
Age: 35
End: 2025-03-27

## 2025-03-27 VITALS
DIASTOLIC BLOOD PRESSURE: 79 MMHG | SYSTOLIC BLOOD PRESSURE: 129 MMHG | RESPIRATION RATE: 18 BRPM | OXYGEN SATURATION: 98 % | TEMPERATURE: 99 F | HEART RATE: 71 BPM

## 2025-03-27 PROCEDURE — 86780 TREPONEMA PALLIDUM: CPT

## 2025-03-27 PROCEDURE — 87389 HIV-1 AG W/HIV-1&-2 AB AG IA: CPT

## 2025-03-27 PROCEDURE — 59050 FETAL MONITOR W/REPORT: CPT

## 2025-03-27 PROCEDURE — 81001 URINALYSIS AUTO W/SCOPE: CPT

## 2025-03-27 PROCEDURE — 83615 LACTATE (LD) (LDH) ENZYME: CPT

## 2025-03-27 PROCEDURE — 86900 BLOOD TYPING SEROLOGIC ABO: CPT

## 2025-03-27 PROCEDURE — 80053 COMPREHEN METABOLIC PANEL: CPT

## 2025-03-27 PROCEDURE — 85027 COMPLETE CBC AUTOMATED: CPT

## 2025-03-27 PROCEDURE — 84156 ASSAY OF PROTEIN URINE: CPT

## 2025-03-27 PROCEDURE — 86850 RBC ANTIBODY SCREEN: CPT

## 2025-03-27 PROCEDURE — 85025 COMPLETE CBC W/AUTO DIFF WBC: CPT

## 2025-03-27 PROCEDURE — 82570 ASSAY OF URINE CREATININE: CPT

## 2025-03-27 PROCEDURE — 86901 BLOOD TYPING SEROLOGIC RH(D): CPT

## 2025-03-27 PROCEDURE — 87522 HEPATITIS C REVRS TRNSCRPJ: CPT

## 2025-03-27 PROCEDURE — 36415 COLL VENOUS BLD VENIPUNCTURE: CPT

## 2025-03-27 PROCEDURE — 84550 ASSAY OF BLOOD/URIC ACID: CPT

## 2025-03-27 RX ORDER — ACETAMINOPHEN 500 MG/5ML
3 LIQUID (ML) ORAL
Qty: 0 | Refills: 0 | DISCHARGE
Start: 2025-03-27

## 2025-03-27 RX ORDER — IBUPROFEN 200 MG
1 TABLET ORAL
Qty: 0 | Refills: 0 | DISCHARGE
Start: 2025-03-27

## 2025-03-27 RX ADMIN — Medication 600 MILLIGRAM(S): at 09:38

## 2025-03-27 RX ADMIN — Medication 975 MILLIGRAM(S): at 13:24

## 2025-03-27 RX ADMIN — Medication 975 MILLIGRAM(S): at 05:43

## 2025-03-27 RX ADMIN — Medication 600 MILLIGRAM(S): at 10:08

## 2025-03-27 RX ADMIN — Medication 1 TABLET(S): at 12:55

## 2025-03-27 RX ADMIN — Medication 975 MILLIGRAM(S): at 06:09

## 2025-03-27 RX ADMIN — Medication 600 MILLIGRAM(S): at 03:56

## 2025-03-27 RX ADMIN — Medication 600 MILLIGRAM(S): at 03:29

## 2025-03-27 RX ADMIN — Medication 975 MILLIGRAM(S): at 00:09

## 2025-03-27 RX ADMIN — Medication 975 MILLIGRAM(S): at 12:54

## 2025-03-27 NOTE — DISCHARGE NOTE OB - MEDICATION SUMMARY - MEDICATIONS TO CHANGE
I will SWITCH the dose or number of times a day I take the medications listed below when I get home from the hospital:    ibuprofen 600 mg oral tablet  -- 1 tab(s) by mouth every 6 hours    acetaminophen 325 mg oral tablet  -- 3 tab(s) by mouth    Prenatal Multivitamins with Folic Acid 1 mg oral tablet  -- 1 tab(s) by mouth once a day

## 2025-03-27 NOTE — PROGRESS NOTE ADULT - ATTENDING COMMENTS
Patient PPD#2 progressing well in the postpartum setting. Postpartum course complicated by gHTN. Asymptomatic this morning. She is most worried about PP depression. Patient has history with prior pregnancy. Denies any SI, HI. Reports good support. Patient not on any medications at this time but is seeing a therapist once a month.   Vitals stable and within normal limits. Exam unremarkable. Minimal bleeding    T(C): 36.6 (03-27-25 @ 06:06), Max: 37.5 (03-26-25 @ 12:42)  HR: 95 (03-27-25 @ 06:06) (69 - 99)  BP: 149/92 (03-27-25 @ 06:06) (126/87 - 149/92)  RR: 18 (03-27-25 @ 06:06) (18 - 18)  SpO2: 97% (03-27-25 @ 06:06) (96% - 98%)      #gHTN  -Plan for repeat BP as last was high mild range  -Patient asymptomatic  -1 week f/up PP visit  -Reviewed warning signs    #PP  Continue with routine postpartum care  Safe for discharge today.

## 2025-03-27 NOTE — DISCHARGE NOTE OB - PATIENT PORTAL LINK FT
You can access the FollowMyHealth Patient Portal offered by [facility] by registering at the following website: [email or web address]. By joining [facility]’s FollowMyHealth portal, you will also be able to view your health information using other applications (apps) compatible with our system.

## 2025-03-27 NOTE — DISCHARGE NOTE OB - NS MD DC FALL RISK RISK
For information on Fall & Injury Prevention, visit: [email or web address] OR  [email or web address] OR  https://www.cdc.gov/steadi/patient.html

## 2025-03-27 NOTE — DISCHARGE NOTE OB - NS DC ANGIO PCI YN
SUBJECTIVE:                                                      Ziggy Baldwin is a 9 year old male, here for a routine health maintenance visit.    Patient was roomed by: Rudolph Magana Child     Social History  Forms to complete? No  Child lives with::  Mother  Who takes care of your child?:  Father, maternal grandmother and stepfather  Languages spoken in the home:  English    Safety / Health Risk  Is your child around anyone who smokes?  No    TB Exposure:     YES, Travel history to tuberculosis endemic countries     Child always wear seatbelt?  Yes  Helmet worn for bicycle/roller blades/skateboard?  Yes    Home Safety Survey:      Firearms in the home?: No       Child ever home alone?  YES     Parents monitor screen use?  Yes    Daily Activities    Dental     Dental provider: patient has a dental home    No dental risks    Sports physical needed: No    Sports Physical Questionnaire    Water source:  City water and bottled water    Diet and Exercise     Child gets at least 4 servings fruit or vegetables daily: Yes    Consumes beverages other than lowfat white milk or water: YES       Other beverages include: more than 4 oz of juice per day and sports drinks    Dairy/calcium sources: 2% milk and cheese    Calcium servings per day: 2    Child gets at least 60 minutes per day of active play: Yes    TV in child's room: No    Sleep       Sleep concerns: no concerns- sleeps well through night     Bedtime: 21:00     Sleep duration (hours): 10    Elimination  Normal urination    Media     Types of media used: video/dvd/tv and computer/ video games    Daily use of media (hours): 1    Activities    Activities: age appropriate activities, playground and rides bike (helmet advised)    Organized/ Team sports: basketball and soccer    School    Name of school: [employer]    Grade level: 5th    School performance: at grade level    Grades: A's & B    Schooling concerns? no    Days missed current/ last year: 10    Academic  problems: no problems in reading, no problems in mathematics, no problems in writing and no learning disabilities     Behavior concerns: no current behavioral concerns in school        Cardiac risk assessment:     Family history (males <55, females <65) of angina (chest pain), heart attack, heart surgery for clogged arteries, or stroke: YES, great grandfather on mother's side     Biological parent(s) with a total cholesterol over 240:  YES, mother     VISION   No corrective lenses (H Plus Lens Screening required)  Tool used: Fleming  Right eye: 10/10 (20/20)  Left eye: 10/10 (20/20)  Two Line Difference: No  Visual Acuity: Pass   H Plus Lens Screening: Pass  Color vision screening: RESCREEN:  PASS  Vision Assessment: normal      HEARING:  Testing not done:  Patient see Audiologist -  Last visit 1/2018.      ===================================    MENTAL HEALTH  Screening:    Electronic PSC   PSC SCORES 4/2/2018   Inattentive / Hyperactive Symptoms Subtotal 0   Externalizing Symptoms Subtotal 0   Internalizing Symptoms Subtotal 1   PSC-17 TOTAL SCORE 1   Inattentive / Hyperactive Symptoms Subtotal 0   Externalizing Symptoms Subtotal 0   Internalizing Symptoms Subtotal 1   PSC - 17 Total Score 1      no followup necessary  No concerns    PROBLEM LIST  Patient Active Problem List   Diagnosis     Chronic serous otitis media     History of tympanoplasty     Bilateral impacted cerumen     Viral syndrome     History of cholesteatoma     MEDICATIONS  Current Outpatient Prescriptions   Medication Sig Dispense Refill     acetaminophen (TYLENOL) 160 MG/5ML elixir Take 17 mLs (544 mg) by mouth every 4 hours as needed for mild pain 250 mL 0     ibuprofen (CHILD IBUPROFEN) 100 MG/5ML suspension Take 20 mLs (400 mg) by mouth every 6 hours as needed for fever or moderate pain 250 mL 0      ALLERGY  No Known Allergies    IMMUNIZATIONS  Immunization History   Administered Date(s) Administered     DTAP (<7y) 2007, 07/14/2008      "DTAP-IPV, <7Y (KINRIX) 04/09/2012     DTaP / Hep B / IPV 2007, 2007     HEPA 04/07/2008, 10/13/2008     HepB 01/11/2008     Hib (PRP-T) 2007, 2007, 2007     Influenza (IIV3) PF 10/13/2008     Influenza Vaccine IM 3yrs+ 4 Valent IIV4 10/10/2013     MMR 04/07/2008, 04/14/2011     Pneumococcal (PCV 7) 2007, 2007, 2007, 07/14/2008     Poliovirus, inactivated (IPV) 2007     Rotavirus, pentavalent 2007, 2007, 2007     Varicella 04/07/2008, 04/14/2011       HEALTH HISTORY SINCE LAST VISIT  No surgery, major illness or injury since last physical exam    ROS  GENERAL: See health history, nutrition and daily activities   SKIN: No  rash, hives or significant lesions  HEENT: Hearing/vision: see above.  No eye, nasal, ear symptoms.  RESP: No cough or other concerns  CV: No concerns  GI: See nutrition and elimination.  No concerns.  : See elimination. No concerns  NEURO: No headaches or concerns.    OBJECTIVE:   EXAM  /61 (BP Location: Right arm, Patient Position: Sitting, Cuff Size: Child)  Pulse 60  Temp 97.4  F (36.3  C) (Oral)  Resp 12  Ht 5' 0.25\" (1.53 m)  Wt 88 lb (39.9 kg)  SpO2 99%  BMI 17.04 kg/m2  91 %ile based on CDC 2-20 Years stature-for-age data using vitals from 4/2/2018.  71 %ile based on CDC 2-20 Years weight-for-age data using vitals from 4/2/2018.  48 %ile based on CDC 2-20 Years BMI-for-age data using vitals from 4/2/2018.  Blood pressure percentiles are 45.0 % systolic and 41.8 % diastolic based on NHBPEP's 4th Report.   GENERAL: Active, alert, in no acute distress.  SKIN: Clear. No significant rash, abnormal pigmentation or lesions  HEAD: Normocephalic  EYES: Pupils equal, round, reactive, Extraocular muscles intact. Normal conjunctivae.  EARS: Normal canals. Tympanic membranes are normal; gray and translucent.  NOSE: Normal without discharge.  MOUTH/THROAT: Clear. No oral lesions. Teeth without obvious " abnormalities.  NECK: Supple, no masses.  No thyromegaly.  LYMPH NODES: No adenopathy  LUNGS: Clear. No rales, rhonchi, wheezing or retractions  HEART: Regular rhythm. Normal S1/S2. No murmurs. Normal pulses.  ABDOMEN: Soft, non-tender, not distended, no masses or hepatosplenomegaly. Bowel sounds normal.   NEUROLOGIC: No focal findings. Cranial nerves grossly intact: DTR's normal. Normal gait, strength and tone  BACK: Spine is straight, no scoliosis.  EXTREMITIES: Full range of motion, no deformities  -M: Normal male external genitalia. Adam stage I,  both testes descended, no hernia.      ASSESSMENT/PLAN:   1. Encounter for routine child health examination w/o abnormal findings    - PURE TONE HEARING TEST, AIR  - SCREENING, VISUAL ACUITY, QUANTITATIVE, BILAT  - BEHAVIORAL / EMOTIONAL ASSESSMENT [[id number]]    Anticipatory Guidance  Reviewed Anticipatory Guidance in patient instructions    Preventive Care Plan  Immunizations    Reviewed, up to date  Referrals/Ongoing Specialty care: No   See other orders in [facility].  Cleared for sports:  Not addressed  BMI at 48 %ile based on CDC 2-20 Years BMI-for-age data using vitals from 4/2/2018.  No weight concerns.  Dyslipidemia risk:    None  Dental visit recommended: Yes  Dental varnish declined by parent    FOLLOW-UP:    in 1 year for a Preventive Care visit    Resources  HPV and Cancer Prevention:  What Parents Should Know  What Kids Should Know About HPV and Cancer  Goal Tracker: Be More Active  Goal Tracker: Less Screen Time  Goal Tracker: Drink More Water  Goal Tracker: Eat More Fruits and Veggies    Izzy Jones NP  [facility]   no

## 2025-03-27 NOTE — DISCHARGE NOTE OB - PLAN OF CARE
Patient to monitor BPs at home. Call if >160/100 or symptomatic.  Close follow up within a week outpatient Standard Postpartum instructions

## 2025-03-27 NOTE — DISCHARGE NOTE OB - CARE PROVIDER_API CALL
Adam Ibrahim  Obstetrics and Gynecology  [address], [address]  [place] [address]  Phone: [phone]  Fax: [phone]  Follow Up Time:

## 2025-03-27 NOTE — DISCHARGE NOTE OB - HOSPITAL COURSE
Patient presented for IOL for gHTN. Successful . Asymptomatic postpartum. Discharged in safe conditions.

## 2025-03-27 NOTE — DISCHARGE NOTE OB - CARE PLAN
Principal Discharge DX:	, delivered  Assessment and plan of treatment:	Standard Postpartum instructions  Secondary Diagnosis:	Gestational hypertension  Assessment and plan of treatment:	Patient to monitor BPs at home. Call if >160/100 or symptomatic.  Close follow up within a week outpatient   1

## 2025-03-27 NOTE — DISCHARGE NOTE OB - MEDICATION SUMMARY - MEDICATIONS TO STOP TAKING
I will STOP taking the medications listed below when I get home from the hospital:    oxyCODONE 5 mg oral tablet  -- 1 tab(s) by mouth every 4 to 8 hours, As Needed -Moderate to Severe Pain (4-10) MDD:2    simethicone 80 mg oral tablet, chewable  -- 1 tab(s) by mouth every 4 hours, As needed, Gas

## 2025-03-27 NOTE — DISCHARGE NOTE OB - FINANCIAL ASSISTANCE
[facility] provides services at a reduced cost to those who are determined to be eligible through [facility]’s financial assistance program. Information regarding [facility]’s financial assistance program can be found by going to [email or web address] or by calling [phone].

## 2025-03-27 NOTE — PROGRESS NOTE ADULT - ASSESSMENT
36y/o PPD#2 from  complicated by gHTN. Patient is currently in stable condition.    #gHTN  - BP's well-controlled overnight, normal to mild range  - HELLP labs wnl, P/C 0.2  - Continue to monitor BP's    #Routine Postpartum Care  - Continue with PO analgesia  - Increase ambulation  - Continue regular diet  - No labs    BETZY Burgos PGY1    **INCOMPLETE NOTE**       36y/o PPD#2 from  complicated by gHTN. Patient is currently in stable condition.    #gHTN  - BP's well-controlled overnight, normal to mild range  - HELLP labs wnl, P/C 0.2  - Continue to monitor BP's    #Routine Postpartum Care  - Continue with PO analgesia  - Increase ambulation  - Continue regular diet  - No labs    BETZY Burgos PGY1

## 2025-03-28 NOTE — PROGRESS NOTE ADULT - SUBJECTIVE AND OBJECTIVE BOX
Covering Ob/Gyn Attending (Mon 8 am-8pm)     36 yo P2 @ 38 wks was sent from the office with elevated blood pressures in mild range.   In triage her BP's are in mild range, she denies symptoms of preeclampsia.   She had preeclampsia in prior pregnancy didn't require Magnesium sulfate.   Patient had a history of  and C/Section for breech.   She also had back surgery (lumbar discectomy and was cleared for regional anesthesia and laparoscopic ovarian cystectomy for hemaperitoneum from ruptured hemorrhagic cyst.   Meds: Vitamins  NKA   BP mild range  FHT reassuring, occasional contractions  VE-3/70/-3 vtx   [id number]-3200 g     Labs reviewed: Hematocrit 35, Platelets 220; no urine protein.     A/P 36 yo P2 @ 38 weeks with gestational hypertension  Will admit for delivery.   Labs sent  Patient desires TOLAC  WIll augment labor with Pitocin, possible cervical balloon placement.   Will sign out to Dr. Stevens, who will make final plan. 
Patient seen and examined at bedside, no acute overnight events. No acute complaints, pain well controlled. Patient is ambulating, voiding spontaneously, passing gas, and tolerating regular diet. Denies CP, SOB, N/V, HA, blurred vision, epigastric pain.    Vital Signs Last 24 Hours  T(C): 36.7 (03-26-25 @ 00:20), Max: 36.9 (03-25-25 @ 12:55)  HR: 69 (03-26-25 @ 00:20) (54 - 137)  BP: 100/64 (03-26-25 @ 00:20) (100/64 - 180/93)  RR: 18 (03-26-25 @ 00:20) (14 - 18)  SpO2: 99% (03-26-25 @ 00:20) (71% - 100%)    Physical Exam:  General: NAD  Abdomen: Soft, non-tender, non-distended, fundus firm  Pelvic: Lochia wnl, external exam of perineum clean and dry without swelling      03-24-25 @ 07:01  -  03-25-25 @ 07:00  --------------------------------------------------------  IN: 717.1 mL / OUT: 1800 mL / NET: -1082.9 mL    03-25-25 @ 07:01  -  03-26-25 @ 03:34  --------------------------------------------------------  IN: 2217.7 mL / OUT: 2500 mL / NET: -282.3 mL        Blood Type: O Positive  Antibody Screen: Negative  RPR: Negative               11.8   11.59 )-----------( 220      ( 03-24 @ 19:24 )             35.0         MEDICATIONS  (STANDING):  acetaminophen     Tablet .. 975 milliGRAM(s) Oral <User Schedule>  diphtheria/tetanus/pertussis (acellular) Vaccine (Adacel) 0.5 milliLiter(s) IntraMuscular once  ibuprofen  Tablet. 600 milliGRAM(s) Oral every 6 hours  influenza   Vaccine 0.5 milliLiter(s) IntraMuscular once  oxytocin Infusion 167 milliUNIT(s)/Min (167 mL/Hr) IV Continuous <Continuous>  oxytocin Infusion 167 milliUNIT(s)/Min (167 mL/Hr) IV Continuous <Continuous>  oxytocin Infusion. 2 milliUNIT(s)/Min (2 mL/Hr) IV Continuous <Continuous>  prenatal multivitamin 1 Tablet(s) Oral daily  sodium chloride 0.9% lock flush 3 milliLiter(s) IV Push every 8 hours    MEDICATIONS  (PRN):  benzocaine 20%/menthol 0.5% Spray 1 Spray(s) Topical every 6 hours PRN for Perineal discomfort  dibucaine 1% Ointment 1 Application(s) Topical every 6 hours PRN Perineal discomfort  diphenhydrAMINE 25 milliGRAM(s) Oral every 6 hours PRN Pruritus  hydrocortisone 1% Cream 1 Application(s) Topical every 6 hours PRN Moderate Pain (4-6)  lanolin Ointment 1 Application(s) Topical every 6 hours PRN nipple soreness  magnesium hydroxide Suspension 30 milliLiter(s) Oral two times a day PRN Constipation  oxyCODONE    IR 5 milliGRAM(s) Oral every 3 hours PRN Moderate to Severe Pain (4-10)  oxyCODONE    IR 5 milliGRAM(s) Oral once PRN Moderate to Severe Pain (4-10)  pramoxine 1%/zinc 5% Cream 1 Application(s) Topical every 4 hours PRN Moderate Pain (4-6)  simethicone 80 milliGRAM(s) Chew every 4 hours PRN Gas  witch hazel Pads 1 Application(s) Topical every 4 hours PRN Perineal discomfort  
Patient seen and examined at bedside, no acute overnight events. No acute complaints, pain well controlled. Patient is ambulating, voiding spontaneously, passing gas, and tolerating regular diet. Denies CP, SOB, N/V, HA, blurred vision, epigastric pain.    Vital Signs Last 24 Hours  T(C): 37.2 (03-27-25 @ 17:00), Max: 37.2 (03-27-25 @ 17:00)  HR: 71 (03-27-25 @ 17:00) (71 - 97)  BP: 129/79 (03-27-25 @ 17:00) (126/83 - 149/92)  RR: 18 (03-27-25 @ 17:00) (18 - 18)  SpO2: 98% (03-27-25 @ 17:00) (97% - 98%)    Physical Exam:  General: NAD  Abdomen: Soft, non-tender, non-distended, fundus firm  Pelvic: Lochia wnl, external exam of perineum clean and dry without swelling        Blood Type: O Positive  Antibody Screen: Negative  RPR: Negative               11.1   16.14 )-----------( 192      ( 03-26 @ 06:02 )             32.9                11.8   11.59 )-----------( 220      ( 03-24 @ 19:24 )             35.0         MEDICATIONS  (STANDING):  acetaminophen     Tablet .. 975 milliGRAM(s) Oral <User Schedule>  diphtheria/tetanus/pertussis (acellular) Vaccine (Adacel) 0.5 milliLiter(s) IntraMuscular once  ibuprofen  Tablet. 600 milliGRAM(s) Oral every 6 hours  influenza   Vaccine 0.5 milliLiter(s) IntraMuscular once  oxytocin Infusion 167 milliUNIT(s)/Min (167 mL/Hr) IV Continuous <Continuous>  oxytocin Infusion 167 milliUNIT(s)/Min (167 mL/Hr) IV Continuous <Continuous>  oxytocin Infusion. 2 milliUNIT(s)/Min (2 mL/Hr) IV Continuous <Continuous>  prenatal multivitamin 1 Tablet(s) Oral daily  sodium chloride 0.9% lock flush 3 milliLiter(s) IV Push every 8 hours    MEDICATIONS  (PRN):  benzocaine 20%/menthol 0.5% Spray 1 Spray(s) Topical every 6 hours PRN for Perineal discomfort  dibucaine 1% Ointment 1 Application(s) Topical every 6 hours PRN Perineal discomfort  diphenhydrAMINE 25 milliGRAM(s) Oral every 6 hours PRN Pruritus  hydrocortisone 1% Cream 1 Application(s) Topical every 6 hours PRN Moderate Pain (4-6)  lanolin Ointment 1 Application(s) Topical every 6 hours PRN nipple soreness  magnesium hydroxide Suspension 30 milliLiter(s) Oral two times a day PRN Constipation  oxyCODONE    IR 5 milliGRAM(s) Oral every 3 hours PRN Moderate to Severe Pain (4-10)  oxyCODONE    IR 5 milliGRAM(s) Oral once PRN Moderate to Severe Pain (4-10)  pramoxine 1%/zinc 5% Cream 1 Application(s) Topical every 4 hours PRN Moderate Pain (4-6)  simethicone 80 milliGRAM(s) Chew every 4 hours PRN Gas  witch hazel Pads 1 Application(s) Topical every 4 hours PRN Perineal discomfort  
Patient seen and examined at bedside, no acute overnight events. No acute complaints, pain well controlled. Patient is ambulating, voiding spontaneously, passing gas, and tolerating regular diet. Denies CP, SOB, N/V, HA, blurred vision, epigastric pain.    Vital Signs Last 24 Hours  T(C): 37.1 (03-26-25 @ 17:00), Max: 37.5 (03-26-25 @ 12:42)  HR: 99 (03-26-25 @ 17:00) (69 - 99)  BP: 126/87 (03-26-25 @ 17:00) (113/76 - 139/85)  RR: 18 (03-26-25 @ 17:00) (18 - 18)  SpO2: 98% (03-26-25 @ 17:00) (96% - 99%)    Physical Exam:  General: NAD  Abdomen: Soft, non-tender, non-distended, fundus firm  Pelvic: Lochia wnl, external exam of perineum clean and dry without swelling      03-25-25 @ 07:01  -  03-26-25 @ 07:00  --------------------------------------------------------  IN: 2217.7 mL / OUT: 3900 mL / NET: -1682.3 mL        Blood Type: O Positive  Antibody Screen: Negative  RPR: Negative               11.1   16.14 )-----------( 192      ( 03-26 @ 06:02 )             32.9                11.8   11.59 )-----------( 220      ( 03-24 @ 19:24 )             35.0         MEDICATIONS  (STANDING):  acetaminophen     Tablet .. 975 milliGRAM(s) Oral <User Schedule>  diphtheria/tetanus/pertussis (acellular) Vaccine (Adacel) 0.5 milliLiter(s) IntraMuscular once  ibuprofen  Tablet. 600 milliGRAM(s) Oral every 6 hours  influenza   Vaccine 0.5 milliLiter(s) IntraMuscular once  oxytocin Infusion 167 milliUNIT(s)/Min (167 mL/Hr) IV Continuous <Continuous>  oxytocin Infusion 167 milliUNIT(s)/Min (167 mL/Hr) IV Continuous <Continuous>  oxytocin Infusion. 2 milliUNIT(s)/Min (2 mL/Hr) IV Continuous <Continuous>  prenatal multivitamin 1 Tablet(s) Oral daily  sodium chloride 0.9% lock flush 3 milliLiter(s) IV Push every 8 hours    MEDICATIONS  (PRN):  benzocaine 20%/menthol 0.5% Spray 1 Spray(s) Topical every 6 hours PRN for Perineal discomfort  dibucaine 1% Ointment 1 Application(s) Topical every 6 hours PRN Perineal discomfort  diphenhydrAMINE 25 milliGRAM(s) Oral every 6 hours PRN Pruritus  hydrocortisone 1% Cream 1 Application(s) Topical every 6 hours PRN Moderate Pain (4-6)  lanolin Ointment 1 Application(s) Topical every 6 hours PRN nipple soreness  magnesium hydroxide Suspension 30 milliLiter(s) Oral two times a day PRN Constipation  oxyCODONE    IR 5 milliGRAM(s) Oral every 3 hours PRN Moderate to Severe Pain (4-10)  oxyCODONE    IR 5 milliGRAM(s) Oral once PRN Moderate to Severe Pain (4-10)  pramoxine 1%/zinc 5% Cream 1 Application(s) Topical every 4 hours PRN Moderate Pain (4-6)  simethicone 80 milliGRAM(s) Chew every 4 hours PRN Gas  witch hazel Pads 1 Application(s) Topical every 4 hours PRN Perineal discomfort

## 2025-03-28 NOTE — PROGRESS NOTE ADULT - ASSESSMENT
36y/o PPD#3 from  complicated by gHTN. Patient is currently in stable condition.    #gHTN  - Patient kept overnight due to mild range BP yesterday, no vitals recorded overnight  - HELLP labs wnl, P/C 0.2  - Continue to monitor BP's    #Routine Postpartum Care  - Continue with PO analgesia  - Increase ambulation  - Continue regular diet  - No labs    BETZY Burgos PGY1    **INCOMPLETE NOTE**

## 2025-04-01 ENCOUNTER — NON-APPOINTMENT (OUTPATIENT)
Age: 35
End: 2025-04-01

## 2025-04-04 ENCOUNTER — EMERGENCY (EMERGENCY)
Facility: HOSPITAL | Age: 35
LOS: 1 days | Discharge: HOME | End: 2025-04-04
Attending: STUDENT IN AN ORGANIZED HEALTH CARE EDUCATION/TRAINING PROGRAM
Payer: COMMERCIAL

## 2025-04-04 VITALS
RESPIRATION RATE: 18 BRPM | HEART RATE: 86 BPM | SYSTOLIC BLOOD PRESSURE: 138 MMHG | WEIGHT: 147.93 LBS | HEIGHT: 64 IN | DIASTOLIC BLOOD PRESSURE: 91 MMHG | TEMPERATURE: 98 F | OXYGEN SATURATION: 97 %

## 2025-04-04 VITALS
OXYGEN SATURATION: 95 % | SYSTOLIC BLOOD PRESSURE: 135 MMHG | RESPIRATION RATE: 16 BRPM | HEART RATE: 77 BPM | TEMPERATURE: 98 F | DIASTOLIC BLOOD PRESSURE: 82 MMHG

## 2025-04-04 DIAGNOSIS — Z98.890 OTHER SPECIFIED POSTPROCEDURAL STATES: Chronic | ICD-10-CM

## 2025-04-04 DIAGNOSIS — N83.209 UNSPECIFIED OVARIAN CYST, UNSPECIFIED SIDE: Chronic | ICD-10-CM

## 2025-04-04 DIAGNOSIS — Z87.2 PERSONAL HISTORY OF DISEASES OF THE SKIN AND SUBCUTANEOUS TISSUE: Chronic | ICD-10-CM

## 2025-04-04 LAB
ALBUMIN SERPL ELPH-MCNC: 3.9 G/DL — SIGNIFICANT CHANGE UP (ref 3.3–5)
ALP SERPL-CCNC: 103 U/L — SIGNIFICANT CHANGE UP (ref 40–120)
ALT FLD-CCNC: 31 U/L — SIGNIFICANT CHANGE UP (ref 10–45)
ANION GAP SERPL CALC-SCNC: 16 MMOL/L — SIGNIFICANT CHANGE UP (ref 5–17)
APPEARANCE UR: CLEAR — SIGNIFICANT CHANGE UP
AST SERPL-CCNC: 27 U/L — SIGNIFICANT CHANGE UP (ref 10–40)
BACTERIA # UR AUTO: NEGATIVE /HPF — SIGNIFICANT CHANGE UP
BASOPHILS # BLD AUTO: 0.05 K/UL — SIGNIFICANT CHANGE UP (ref 0–0.2)
BASOPHILS NFR BLD AUTO: 0.4 % — SIGNIFICANT CHANGE UP (ref 0–2)
BILIRUB SERPL-MCNC: 0.4 MG/DL — SIGNIFICANT CHANGE UP (ref 0.2–1.2)
BILIRUB UR-MCNC: NEGATIVE — SIGNIFICANT CHANGE UP
BLD GP AB SCN SERPL QL: NEGATIVE — SIGNIFICANT CHANGE UP
BUN SERPL-MCNC: 16 MG/DL — SIGNIFICANT CHANGE UP (ref 7–23)
CALCIUM SERPL-MCNC: 8.8 MG/DL — SIGNIFICANT CHANGE UP (ref 8.4–10.5)
CAST: 0 /LPF — SIGNIFICANT CHANGE UP (ref 0–4)
CHLORIDE SERPL-SCNC: 103 MMOL/L — SIGNIFICANT CHANGE UP (ref 96–108)
CO2 SERPL-SCNC: 19 MMOL/L — LOW (ref 22–31)
COLOR SPEC: YELLOW — SIGNIFICANT CHANGE UP
CREAT SERPL-MCNC: 0.88 MG/DL — SIGNIFICANT CHANGE UP (ref 0.5–1.3)
DIFF PNL FLD: ABNORMAL
EGFR: 87 ML/MIN/1.73M2 — SIGNIFICANT CHANGE UP
EGFR: 87 ML/MIN/1.73M2 — SIGNIFICANT CHANGE UP
EOSINOPHIL # BLD AUTO: 0.11 K/UL — SIGNIFICANT CHANGE UP (ref 0–0.5)
EOSINOPHIL NFR BLD AUTO: 1 % — SIGNIFICANT CHANGE UP (ref 0–6)
GLUCOSE SERPL-MCNC: 88 MG/DL — SIGNIFICANT CHANGE UP (ref 70–99)
GLUCOSE UR QL: NEGATIVE MG/DL — SIGNIFICANT CHANGE UP
HCT VFR BLD CALC: 40 % — SIGNIFICANT CHANGE UP (ref 34.5–45)
HGB BLD-MCNC: 13.5 G/DL — SIGNIFICANT CHANGE UP (ref 11.5–15.5)
IMM GRANULOCYTES NFR BLD AUTO: 0.3 % — SIGNIFICANT CHANGE UP (ref 0–0.9)
KETONES UR-MCNC: NEGATIVE MG/DL — SIGNIFICANT CHANGE UP
LEUKOCYTE ESTERASE UR-ACNC: ABNORMAL
LIDOCAIN IGE QN: 37 U/L — SIGNIFICANT CHANGE UP (ref 7–60)
LYMPHOCYTES # BLD AUTO: 2.46 K/UL — SIGNIFICANT CHANGE UP (ref 1–3.3)
LYMPHOCYTES # BLD AUTO: 21.4 % — SIGNIFICANT CHANGE UP (ref 13–44)
MCHC RBC-ENTMCNC: 29.3 PG — SIGNIFICANT CHANGE UP (ref 27–34)
MCHC RBC-ENTMCNC: 33.8 G/DL — SIGNIFICANT CHANGE UP (ref 32–36)
MCV RBC AUTO: 87 FL — SIGNIFICANT CHANGE UP (ref 80–100)
MONOCYTES # BLD AUTO: 0.68 K/UL — SIGNIFICANT CHANGE UP (ref 0–0.9)
MONOCYTES NFR BLD AUTO: 5.9 % — SIGNIFICANT CHANGE UP (ref 2–14)
NEUTROPHILS # BLD AUTO: 8.13 K/UL — HIGH (ref 1.8–7.4)
NEUTROPHILS NFR BLD AUTO: 71 % — SIGNIFICANT CHANGE UP (ref 43–77)
NITRITE UR-MCNC: NEGATIVE — SIGNIFICANT CHANGE UP
NRBC BLD AUTO-RTO: 0 /100 WBCS — SIGNIFICANT CHANGE UP (ref 0–0)
PH UR: 6.5 — SIGNIFICANT CHANGE UP (ref 5–8)
PLATELET # BLD AUTO: 391 K/UL — SIGNIFICANT CHANGE UP (ref 150–400)
POTASSIUM SERPL-MCNC: 3.9 MMOL/L — SIGNIFICANT CHANGE UP (ref 3.5–5.3)
POTASSIUM SERPL-SCNC: 3.9 MMOL/L — SIGNIFICANT CHANGE UP (ref 3.5–5.3)
PROT SERPL-MCNC: 7.2 G/DL — SIGNIFICANT CHANGE UP (ref 6–8.3)
PROT UR-MCNC: NEGATIVE MG/DL — SIGNIFICANT CHANGE UP
RBC # BLD: 4.6 M/UL — SIGNIFICANT CHANGE UP (ref 3.8–5.2)
RBC # FLD: 13.8 % — SIGNIFICANT CHANGE UP (ref 10.3–14.5)
RBC CASTS # UR COMP ASSIST: 101 /HPF — HIGH (ref 0–4)
REVIEW: SIGNIFICANT CHANGE UP
RH IG SCN BLD-IMP: POSITIVE — SIGNIFICANT CHANGE UP
SODIUM SERPL-SCNC: 138 MMOL/L — SIGNIFICANT CHANGE UP (ref 135–145)
SP GR SPEC: 1.01 — SIGNIFICANT CHANGE UP (ref 1–1.03)
SQUAMOUS # UR AUTO: 6 /HPF — HIGH (ref 0–5)
UROBILINOGEN FLD QL: 0.2 MG/DL — SIGNIFICANT CHANGE UP (ref 0.2–1)
WBC # BLD: 11.47 K/UL — HIGH (ref 3.8–10.5)
WBC # FLD AUTO: 11.47 K/UL — HIGH (ref 3.8–10.5)
WBC UR QL: 71 /HPF — HIGH (ref 0–5)

## 2025-04-04 PROCEDURE — 99285 EMERGENCY DEPT VISIT HI MDM: CPT | Mod: 25

## 2025-04-04 PROCEDURE — 86901 BLOOD TYPING SEROLOGIC RH(D): CPT

## 2025-04-04 PROCEDURE — 99285 EMERGENCY DEPT VISIT HI MDM: CPT

## 2025-04-04 PROCEDURE — 76856 US EXAM PELVIC COMPLETE: CPT | Mod: 26

## 2025-04-04 PROCEDURE — 87086 URINE CULTURE/COLONY COUNT: CPT

## 2025-04-04 PROCEDURE — 96374 THER/PROPH/DIAG INJ IV PUSH: CPT

## 2025-04-04 PROCEDURE — 83690 ASSAY OF LIPASE: CPT

## 2025-04-04 PROCEDURE — 93976 VASCULAR STUDY: CPT

## 2025-04-04 PROCEDURE — 86850 RBC ANTIBODY SCREEN: CPT

## 2025-04-04 PROCEDURE — 80053 COMPREHEN METABOLIC PANEL: CPT

## 2025-04-04 PROCEDURE — 76856 US EXAM PELVIC COMPLETE: CPT

## 2025-04-04 PROCEDURE — 81001 URINALYSIS AUTO W/SCOPE: CPT

## 2025-04-04 PROCEDURE — 86900 BLOOD TYPING SEROLOGIC ABO: CPT

## 2025-04-04 PROCEDURE — 85025 COMPLETE CBC W/AUTO DIFF WBC: CPT

## 2025-04-04 PROCEDURE — 93976 VASCULAR STUDY: CPT | Mod: 26,59

## 2025-04-04 RX ORDER — AMPICILLIN SODIUM 1 G/1
INJECTION, POWDER, FOR SOLUTION INTRAMUSCULAR; INTRAVENOUS
Refills: 0 | Status: DISCONTINUED | OUTPATIENT
Start: 2025-04-04 | End: 2025-04-04

## 2025-04-04 RX ORDER — ACETAMINOPHEN 500 MG/5ML
1000 LIQUID (ML) ORAL ONCE
Refills: 0 | Status: COMPLETED | OUTPATIENT
Start: 2025-04-04 | End: 2025-04-04

## 2025-04-04 RX ORDER — IBUPROFEN 200 MG
600 TABLET ORAL ONCE
Refills: 0 | Status: COMPLETED | OUTPATIENT
Start: 2025-04-04 | End: 2025-04-04

## 2025-04-04 RX ORDER — GENTAMICIN SULFATE 40 MG/ML
120 VIAL (ML) INJECTION ONCE
Refills: 0 | Status: DISCONTINUED | OUTPATIENT
Start: 2025-04-04 | End: 2025-04-04

## 2025-04-04 RX ORDER — GENTAMICIN SULFATE 40 MG/ML
VIAL (ML) INJECTION
Refills: 0 | Status: DISCONTINUED | OUTPATIENT
Start: 2025-04-04 | End: 2025-04-04

## 2025-04-04 RX ORDER — GENTAMICIN SULFATE 40 MG/ML
80 VIAL (ML) INJECTION EVERY 8 HOURS
Refills: 0 | Status: DISCONTINUED | OUTPATIENT
Start: 2025-04-04 | End: 2025-04-04

## 2025-04-04 RX ORDER — AMPICILLIN SODIUM 1 G/1
2 INJECTION, POWDER, FOR SOLUTION INTRAMUSCULAR; INTRAVENOUS ONCE
Refills: 0 | Status: DISCONTINUED | OUTPATIENT
Start: 2025-04-04 | End: 2025-04-04

## 2025-04-04 RX ORDER — PIPERACILLIN-TAZO-DEXTROSE,ISO 3.375G/5
3.38 IV SOLUTION, PIGGYBACK PREMIX FROZEN(ML) INTRAVENOUS ONCE
Refills: 0 | Status: COMPLETED | OUTPATIENT
Start: 2025-04-04 | End: 2025-04-04

## 2025-04-04 RX ADMIN — Medication 1000 MILLIGRAM(S): at 16:24

## 2025-04-04 RX ADMIN — Medication 200 GRAM(S): at 16:33

## 2025-04-04 RX ADMIN — Medication 600 MILLIGRAM(S): at 16:24

## 2025-04-04 NOTE — ED PROVIDER NOTE - CLINICAL SUMMARY MEDICAL DECISION MAKING FREE TEXT BOX
36yo F  who recently vaginally delivered 10 days ago p/w vaginal discharge. She is endorsing some continued vaginal bleeding, but no signs of anemia such as lightheadedness, shortness of breath, weakness. She endorses continued lower abdominal pain and some foul smelling discharge. She was seen at her OBGYN's office and there was concern for retained products of conception and possible infection and so she was sent in for evaluation. Will obtain cultures and repeat TVUS and start antibiotics. Will look for other sources of infection as well such as urine.

## 2025-04-04 NOTE — ED PROVIDER NOTE - NSFOLLOWUPINSTRUCTIONS_ED_ALL_ED_FT
You were seen in the Emergency Department for lower abdominal pain and vaginal discharge/bleeding. You     1) Continue all previously prescribed medications as directed.    2) Follow up with your primary care physician - take copies of your results.    3) Return to the Emergency Department for worsening or persistent symptoms, and/or ANY NEW OR CONCERNING SYMPTOMS. You were seen in the Emergency Department for lower abdominal pain and vaginal discharge/bleeding 10 days after vaginal delivery. You were examined and given empiric antibiotics. Your blood work was nonactionable. Your urinalysis was nonactionable at this time but sent for culture to further assess for UTI. Ultrasound of your pelvis showed blood products and an endometrium measuring up to 1.3cm NOT 13cm (that was a typo on the report). You do not warrant antibiotics or any further intervention at this point and are clear for discharge. Please follow up with your OB/GYN within the next couple of days.    1) Continue all previously prescribed medications as directed.    2) Follow up with your primary care physician - take copies of your results.    3) Return to the Emergency Department for worsening or persistent symptoms, and/or ANY NEW OR CONCERNING SYMPTOMS.

## 2025-04-04 NOTE — ED PROVIDER NOTE - PROGRESS NOTE DETAILS
Maurice PGY3: Spoke with OB resident. Patient cleared for discharge, no further treatment. They spoke with radiology regarding 13cm endometrium and confirmed that is a typo and actually 1.3cm. Maurice PGY3: Pt was reassessed and is doing well. Results, including any incidental findings, were discussed. Follow up and return precautions were discussed. Patient verbalized understanding Maurice PGY3: Spoke with OB resident. Patient cleared for discharge, no further treatment. They spoke with radiology regarding 13cm endometrium and confirmed that is a typo and actually 1.3cm. Also does not advise treating for UTI and instead waiting for the urine culture

## 2025-04-04 NOTE — CONSULT NOTE ADULT - SUBJECTIVE AND OBJECTIVE BOX
PEARL ARTHUR  35y  Female [id number]    HPI:  35 year old  PPD#8 s/p  c/b gHTN presents to ER after being seen by outpatient OB today. Patient states that she was initially seeing her OB for a blood pressure given her history of gHTN. At time of her OB visit she endorsed increased vaginal bleeding so her OB did a TVUS which showed at thickened stripe so she sent her in for rule out retained products of conception. Additionally, at time of OB assessment in office patient was noted to have foul discharge so was also sent in for rule out endometritis.    On OB evaluation patient endorses some pelvic pain that has been constant since delivery. Denies any abnormal discharge. Denies fevers, chills, nausea, vomiting, chest pain, shortness of breath. Denies headache, changes in vision, RUQ pain, epigastric pain.    Name of OB Physician: Rodney    OBHx: C/S x1,  x1,  x 1, TOP x1  GYNHx: 2017 Hemorrhagic Cyst s/p laparoscopic cystectomy. No fibroids, hx of abnormal pap or STDs  PMHx: Denies  PSurgHx:  Laminectomy/Microdiscectomy, Lsc Ovarian Cystectomy, C/S x1               Allergies: NKDA, Neosporin Topical Ointment: Hives  Meds: PNV  Social: No smoking, alcohol, or illicit drug use during pregnancy       Vital Signs Last 24 Hrs  T(C): 36.8 (2025 15:25), Max: 36.8 (2025 14:48)  T(F): 98.2 (2025 15:25), Max: 98.2 (2025 14:48)  HR: 70 (2025 15:25) (70 - 86)  BP: 146/67 (2025 15:25) (138/91 - 146/67)  BP(mean): 97 (2025 15:25) (97 - 97)  RR: 18 (2025 15:25) (18 - 18)  SpO2: 97% (2025 15:25) (97% - 97%)    Parameters below as of 2025 15:25  Patient On (Oxygen Delivery Method): room air    Physical Exam:   General: sitting comfortably in bed, NAD   CV: RR S1S2 no m/r/g  Lungs: CTA b/l, good air flow b/l   Back: No CVA tenderness  Abd: Soft, non-tender, non-distended.  Bowel sounds present.    : Pad 30% saturated with old blood. No uterine tenderness. No adnexal tenderness, no CMT  Speculum Exam: No active bleeding from os.  Normal lochia  Ext: non-tender b/l, no edema     LABS:                              13.5   11.47 )-----------( 391      ( 2025 16:05 )             40.0     04-    138  |  103  |  16  ----------------------------<  88  3.9   |  19[L]  |  0.88    Ca    8.8      2025 16:05    TPro  7.2  /  Alb  3.9  /  TBili  0.4  /  DBili  x   /  AST  27  /  ALT  31  /  AlkPhos  103  04-04    I&O's Detail      Urinalysis Basic - ( 2025 16:05 )    Color: Yellow / Appearance: Clear / S.011 / pH: x  Gluc: 88 mg/dL / Ketone: Negative mg/dL  / Bili: Negative / Urobili: 0.2 mg/dL   Blood: x / Protein: Negative mg/dL / Nitrite: Negative   Leuk Esterase: Large / RBC: 101 /HPF / WBC 71 /HPF   Sq Epi: x / Non Sq Epi: 6 /HPF / Bacteria: Negative /HPF        RADIOLOGY & ADDITIONAL STUDIES:

## 2025-04-04 NOTE — ED PROVIDER NOTE - PHYSICAL EXAMINATION
PHYSICAL EXAM:  GENERAL: NAD, well-developed  CHEST/LUNG: Clear to auscultation bilaterally; No wheeze  HEART: Regular rate and rhythm; No murmurs, rubs, or gallops  ABDOMEN: Soft, mildly tender in lower quadrants b/l  PSYCH: AAOx3  NEUROLOGY: non-focal  SKIN: No rashes or lesions

## 2025-04-04 NOTE — ED ADULT NURSE NOTE - OBJECTIVE STATEMENT
PT is a 35 year old A&OX4 female  who presents to the ED from home with c/o possible retained products. PT was induced on  for HTN and gave birth vaginally at 38 weeks. PT states she went to her OBGYN and was sent to the ED with concern for retained products. PT endorsing 4/10 pelvic pain and denies use of OTC pain relievers. PT states she is saturating 3-4 pads per day. PT denies chest pain, SOB, N/V/D, dizziness, fevers, chills, and urinary symptoms. PT is resting comfortably in bed, breathing unlabored on room air, and speaking in complete sentences. Skin is warm and dry, no diaphoresis noted. No edema noted to B/L extremities. Strong strength in B/L extremities, sensation intact. IV access established 20G in LAC. PT placed in hospital gown. PT ambulatory with steady gait. Safety and comfort maintained. PT is a 35 year old A&OX4 female  who presents to the ED from home with c/o possible retained products. PT was induced on 2025 for HTN and gave birth vaginally at 38 weeks. PT states she went to her OBGYN and was sent to the ED with concern for retained products. PT endorsing 4/10 pelvic pain and denies use of OTC pain relievers. PT states she is saturating 3-4 pads per day. PT denies chest pain, SOB, N/V/D, dizziness, fevers, chills, and urinary symptoms. PT is resting comfortably in bed, breathing unlabored on room air, and speaking in complete sentences. Skin is warm and dry, no diaphoresis noted. No edema noted to B/L extremities. Strong strength in B/L extremities, sensation intact. IV access established 20G in LAC. PT placed in hospital gown. PT ambulatory with steady gait. Safety and comfort maintained.

## 2025-04-04 NOTE — CONSULT NOTE ADULT - ASSESSMENT
Assessment  35 year old  PPD#8 s/p  c/b gHTN presents to ER after being seen by outpatient OB today to rule out retained POC vs endometritis. In the ER patient vitally stable with overall benign exam. Given lack of fevers, uterine tenderness, and normal lochia low concern for endometritis.    Plan  -f/u TVUS and rest of plan pending TVUS  -all questions answered to patient's satisfaction    discussed w Dr Rodney Bianchi PGY4

## 2025-04-04 NOTE — ED PROVIDER NOTE - PATIENT PORTAL LINK FT
You can access the FollowMyHealth Patient Portal offered by [facility] by registering at the following website: [email or web address]. By joining [facility]’s FollowMyHealth portal, you will also be able to view your health information using other applications (apps) compatible with our system.

## 2025-04-04 NOTE — ED ADULT TRIAGE NOTE - CHIEF COMPLAINT QUOTE
march 25 had a vaginal induced delivery at 38 weeks for HTN had return follow up and  sent in by OBGYN for concerns  of retained product denies fevers chills. indorses pelvic pain vaginal bleeding 3/4 pads in 24hrs

## 2025-04-04 NOTE — ED ADULT NURSE REASSESSMENT NOTE - NS ED NURSE REASSESS COMMENT FT1
Abx not available in Pyxis. Spoke with pharmacist who states medication will be sent via tube. Awaiting medication arrival.

## 2025-04-06 LAB
CULTURE RESULTS: SIGNIFICANT CHANGE UP
SPECIMEN SOURCE: SIGNIFICANT CHANGE UP

## 2025-05-13 ENCOUNTER — APPOINTMENT (OUTPATIENT)
Dept: PSYCHIATRY | Facility: CLINIC | Age: 35
End: 2025-05-13
Payer: COMMERCIAL

## 2025-05-13 PROCEDURE — 90792 PSYCH DIAG EVAL W/MED SRVCS: CPT | Mod: 95

## 2025-05-27 ENCOUNTER — APPOINTMENT (OUTPATIENT)
Dept: PSYCHIATRY | Facility: CLINIC | Age: 35
End: 2025-05-27

## 2025-05-27 PROCEDURE — 99214 OFFICE O/P EST MOD 30 MIN: CPT | Mod: 95

## 2025-05-27 PROCEDURE — 90833 PSYTX W PT W E/M 30 MIN: CPT | Mod: 95

## 2025-06-09 ENCOUNTER — APPOINTMENT (OUTPATIENT)
Dept: ENDOCRINOLOGY | Facility: CLINIC | Age: 35
End: 2025-06-09
Payer: COMMERCIAL

## 2025-06-09 VITALS
HEART RATE: 87 BPM | WEIGHT: 148 LBS | DIASTOLIC BLOOD PRESSURE: 82 MMHG | SYSTOLIC BLOOD PRESSURE: 122 MMHG | BODY MASS INDEX: 25.4 KG/M2 | OXYGEN SATURATION: 99 %

## 2025-06-09 LAB
T3 SERPL-MCNC: 104 NG/DL
T4 FREE SERPL-MCNC: 1.2 NG/DL
TSH SERPL-ACNC: 1.03 UIU/ML

## 2025-06-09 PROCEDURE — 76536 US EXAM OF HEAD AND NECK: CPT | Mod: 52

## 2025-06-09 PROCEDURE — 99214 OFFICE O/P EST MOD 30 MIN: CPT

## 2025-06-10 ENCOUNTER — APPOINTMENT (OUTPATIENT)
Dept: PSYCHIATRY | Facility: CLINIC | Age: 35
End: 2025-06-10
Payer: COMMERCIAL

## 2025-06-10 PROCEDURE — 90791 PSYCH DIAGNOSTIC EVALUATION: CPT | Mod: 95

## 2025-06-17 ENCOUNTER — APPOINTMENT (OUTPATIENT)
Dept: PSYCHIATRY | Facility: CLINIC | Age: 35
End: 2025-06-17
Payer: COMMERCIAL

## 2025-06-17 ENCOUNTER — APPOINTMENT (OUTPATIENT)
Dept: PSYCHIATRY | Facility: CLINIC | Age: 35
End: 2025-06-17

## 2025-06-17 PROCEDURE — 90791 PSYCH DIAGNOSTIC EVALUATION: CPT | Mod: 95

## 2025-06-17 PROCEDURE — 99214 OFFICE O/P EST MOD 30 MIN: CPT | Mod: 95

## 2025-06-17 PROCEDURE — 90833 PSYTX W PT W E/M 30 MIN: CPT | Mod: 95

## 2025-07-01 ENCOUNTER — APPOINTMENT (OUTPATIENT)
Dept: PSYCHIATRY | Facility: CLINIC | Age: 35
End: 2025-07-01
Payer: COMMERCIAL

## 2025-07-01 ENCOUNTER — APPOINTMENT (OUTPATIENT)
Dept: PSYCHIATRY | Facility: CLINIC | Age: 35
End: 2025-07-01

## 2025-07-01 PROCEDURE — 99214 OFFICE O/P EST MOD 30 MIN: CPT | Mod: 95

## 2025-07-01 PROCEDURE — 90837 PSYTX W PT 60 MINUTES: CPT | Mod: 95

## 2025-07-01 PROCEDURE — 90833 PSYTX W PT W E/M 30 MIN: CPT | Mod: 95

## 2025-07-15 ENCOUNTER — APPOINTMENT (OUTPATIENT)
Dept: PSYCHIATRY | Facility: CLINIC | Age: 35
End: 2025-07-15
Payer: COMMERCIAL

## 2025-07-15 PROCEDURE — 90837 PSYTX W PT 60 MINUTES: CPT | Mod: 95

## 2025-07-29 ENCOUNTER — APPOINTMENT (OUTPATIENT)
Dept: PSYCHIATRY | Facility: CLINIC | Age: 35
End: 2025-07-29
Payer: COMMERCIAL

## 2025-07-29 PROCEDURE — 99214 OFFICE O/P EST MOD 30 MIN: CPT | Mod: 95

## 2025-07-29 PROCEDURE — 90833 PSYTX W PT W E/M 30 MIN: CPT | Mod: 95

## 2025-08-05 ENCOUNTER — APPOINTMENT (OUTPATIENT)
Dept: PSYCHIATRY | Facility: CLINIC | Age: 35
End: 2025-08-05
Payer: COMMERCIAL

## 2025-08-05 DIAGNOSIS — F32.1 MAJOR DEPRESSIVE DISORDER, SINGLE EPISODE, MODERATE: ICD-10-CM

## 2025-08-05 PROCEDURE — 90837 PSYTX W PT 60 MINUTES: CPT | Mod: 95

## 2025-08-19 ENCOUNTER — APPOINTMENT (OUTPATIENT)
Dept: PSYCHIATRY | Facility: CLINIC | Age: 35
End: 2025-08-19
Payer: COMMERCIAL

## 2025-08-19 DIAGNOSIS — F32.1 MAJOR DEPRESSIVE DISORDER, SINGLE EPISODE, MODERATE: ICD-10-CM

## 2025-08-19 PROCEDURE — 90837 PSYTX W PT 60 MINUTES: CPT | Mod: 95

## 2025-08-26 ENCOUNTER — APPOINTMENT (OUTPATIENT)
Dept: PSYCHIATRY | Facility: CLINIC | Age: 35
End: 2025-08-26

## 2025-08-26 PROCEDURE — 99214 OFFICE O/P EST MOD 30 MIN: CPT | Mod: 95

## 2025-08-26 PROCEDURE — 90833 PSYTX W PT W E/M 30 MIN: CPT | Mod: 95

## 2025-09-11 ENCOUNTER — APPOINTMENT (OUTPATIENT)
Dept: PSYCHIATRY | Facility: CLINIC | Age: 35
End: 2025-09-11

## 2025-09-11 ENCOUNTER — APPOINTMENT (OUTPATIENT)
Dept: PSYCHIATRY | Facility: CLINIC | Age: 35
End: 2025-09-11
Payer: COMMERCIAL

## 2025-09-11 DIAGNOSIS — F32.1 MAJOR DEPRESSIVE DISORDER, SINGLE EPISODE, MODERATE: ICD-10-CM

## 2025-09-11 PROCEDURE — 90837 PSYTX W PT 60 MINUTES: CPT | Mod: 95
